# Patient Record
Sex: MALE | Race: WHITE
[De-identification: names, ages, dates, MRNs, and addresses within clinical notes are randomized per-mention and may not be internally consistent; named-entity substitution may affect disease eponyms.]

---

## 2018-04-02 ENCOUNTER — HOSPITAL ENCOUNTER (OUTPATIENT)
Dept: HOSPITAL 8 - CVU | Age: 83
Discharge: HOME | End: 2018-04-02
Attending: INTERNAL MEDICINE
Payer: MEDICARE

## 2018-04-02 DIAGNOSIS — I35.1: Primary | ICD-10-CM

## 2018-04-02 DIAGNOSIS — I10: ICD-10-CM

## 2018-04-02 DIAGNOSIS — I25.10: ICD-10-CM

## 2018-04-02 LAB
ALBUMIN SERPL-MCNC: 3.7 G/DL (ref 3.4–5)
ALP SERPL-CCNC: 74 U/L (ref 45–117)
ALT SERPL-CCNC: 35 U/L (ref 12–78)
ANION GAP SERPL CALC-SCNC: 7 MMOL/L (ref 5–15)
BASOPHILS # BLD AUTO: 0.03 X10^3/UL (ref 0–0.1)
BASOPHILS NFR BLD AUTO: 1 % (ref 0–1)
BILIRUB SERPL-MCNC: 0.7 MG/DL (ref 0.2–1)
CALCIUM SERPL-MCNC: 8.6 MG/DL (ref 8.5–10.1)
CHLORIDE SERPL-SCNC: 104 MMOL/L (ref 98–107)
CHOL/HDL RATIO: 2.4
CREAT SERPL-MCNC: 1.13 MG/DL (ref 0.7–1.3)
EOSINOPHIL # BLD AUTO: 0.09 X10^3/UL (ref 0–0.4)
EOSINOPHIL NFR BLD AUTO: 2 % (ref 1–7)
ERYTHROCYTE [DISTWIDTH] IN BLOOD BY AUTOMATED COUNT: 14 % (ref 9.4–14.8)
HDL CHOL %: 42 % (ref 26–37)
HDL CHOLESTEROL (DIRECT): 72 MG/DL (ref 40–60)
LDL CHOLESTEROL,CALCULATED: 75 MG/DL (ref 54–169)
LDLC/HDLC SERPL: 1 {RATIO} (ref 0.5–3)
LYMPHOCYTES # BLD AUTO: 1.55 X10^3/UL (ref 1–3.4)
LYMPHOCYTES NFR BLD AUTO: 26 % (ref 22–44)
MCH RBC QN AUTO: 34.6 PG (ref 27.5–34.5)
MCHC RBC AUTO-ENTMCNC: 34 G/DL (ref 33.2–36.2)
MCV RBC AUTO: 101.7 FL (ref 81–97)
MD: NO
MONOCYTES # BLD AUTO: 0.58 X10^3/UL (ref 0.2–0.8)
MONOCYTES NFR BLD AUTO: 10 % (ref 2–9)
NEUTROPHILS # BLD AUTO: 3.72 X10^3/UL (ref 1.8–6.8)
NEUTROPHILS NFR BLD AUTO: 62 % (ref 42–75)
PLATELET # BLD AUTO: 187 X10^3/UL (ref 130–400)
PMV BLD AUTO: 7.4 FL (ref 7.4–10.4)
PROT SERPL-MCNC: 7.4 G/DL (ref 6.4–8.2)
RBC # BLD AUTO: 4.37 X10^6/UL (ref 4.38–5.82)
T4 (THYROXINE): 6.5 MCG/DL (ref 4.5–12.1)
TRIGL SERPL-MCNC: 114 MG/DL (ref 50–200)
TSH SERPL-ACNC: 3.58 MIU/L (ref 0.36–3.74)
VLDLC SERPL CALC-MCNC: 23 MG/DL (ref 0–25)

## 2018-04-02 PROCEDURE — 85025 COMPLETE CBC W/AUTO DIFF WBC: CPT

## 2018-04-02 PROCEDURE — 84443 ASSAY THYROID STIM HORMONE: CPT

## 2018-04-02 PROCEDURE — 36415 COLL VENOUS BLD VENIPUNCTURE: CPT

## 2018-04-02 PROCEDURE — 84436 ASSAY OF TOTAL THYROXINE: CPT

## 2018-04-02 PROCEDURE — 84481 FREE ASSAY (FT-3): CPT

## 2018-04-02 PROCEDURE — 93306 TTE W/DOPPLER COMPLETE: CPT

## 2018-04-02 PROCEDURE — 80053 COMPREHEN METABOLIC PANEL: CPT

## 2018-04-02 PROCEDURE — 80061 LIPID PANEL: CPT

## 2020-01-01 ENCOUNTER — APPOINTMENT (OUTPATIENT)
Dept: RADIOLOGY | Facility: MEDICAL CENTER | Age: 85
DRG: 177 | End: 2020-01-01
Attending: HOSPITALIST
Payer: MEDICARE

## 2020-01-01 ENCOUNTER — APPOINTMENT (OUTPATIENT)
Dept: RADIOLOGY | Facility: MEDICAL CENTER | Age: 85
DRG: 177 | End: 2020-01-01
Attending: EMERGENCY MEDICINE
Payer: MEDICARE

## 2020-01-01 ENCOUNTER — TELEPHONE (OUTPATIENT)
Dept: CARDIOLOGY | Facility: MEDICAL CENTER | Age: 85
End: 2020-01-01

## 2020-01-01 ENCOUNTER — APPOINTMENT (OUTPATIENT)
Dept: RADIOLOGY | Facility: MEDICAL CENTER | Age: 85
DRG: 177 | End: 2020-01-01
Attending: NURSE PRACTITIONER
Payer: MEDICARE

## 2020-01-01 ENCOUNTER — HOSPITAL ENCOUNTER (INPATIENT)
Facility: MEDICAL CENTER | Age: 85
LOS: 22 days | DRG: 177 | End: 2020-08-25
Attending: EMERGENCY MEDICINE | Admitting: INTERNAL MEDICINE
Payer: MEDICARE

## 2020-01-01 ENCOUNTER — APPOINTMENT (OUTPATIENT)
Dept: CARDIOLOGY | Facility: MEDICAL CENTER | Age: 85
DRG: 177 | End: 2020-01-01
Attending: HOSPITALIST
Payer: MEDICARE

## 2020-01-01 VITALS
HEART RATE: 87 BPM | RESPIRATION RATE: 16 BRPM | BODY MASS INDEX: 22.57 KG/M2 | SYSTOLIC BLOOD PRESSURE: 121 MMHG | TEMPERATURE: 98.2 F | OXYGEN SATURATION: 95 % | DIASTOLIC BLOOD PRESSURE: 74 MMHG | WEIGHT: 157.63 LBS | HEIGHT: 70 IN

## 2020-01-01 DIAGNOSIS — E53.8 VITAMIN B12 DEFICIENCY: ICD-10-CM

## 2020-01-01 DIAGNOSIS — G93.6 VASOGENIC EDEMA (HCC): ICD-10-CM

## 2020-01-01 DIAGNOSIS — R90.89 ABNORMAL CT OF BRAIN: ICD-10-CM

## 2020-01-01 DIAGNOSIS — D53.9 MACROCYTIC ANEMIA: ICD-10-CM
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
## 2020-01-01 DIAGNOSIS — S42.035A CLOSED NONDISPLACED FRACTURE OF ACROMIAL END OF LEFT CLAVICLE, INITIAL ENCOUNTER: ICD-10-CM

## 2020-01-01 DIAGNOSIS — R52 INTRACTABLE PAIN: ICD-10-CM

## 2020-01-01 LAB
ALBUMIN SERPL BCP-MCNC: 2.5 G/DL (ref 3.2–4.9)
ALBUMIN SERPL BCP-MCNC: 2.6 G/DL (ref 3.2–4.9)
ALBUMIN SERPL BCP-MCNC: 2.7 G/DL (ref 3.2–4.9)
ALBUMIN SERPL BCP-MCNC: 2.8 G/DL (ref 3.2–4.9)
ALBUMIN SERPL BCP-MCNC: 2.9 G/DL (ref 3.2–4.9)
ALBUMIN SERPL BCP-MCNC: 3 G/DL (ref 3.2–4.9)
ALBUMIN SERPL BCP-MCNC: 3.1 G/DL (ref 3.2–4.9)
ALBUMIN SERPL BCP-MCNC: 3.3 G/DL (ref 3.2–4.9)
ALBUMIN/GLOB SERPL: 0.8 G/DL
ALBUMIN/GLOB SERPL: 0.9 G/DL
ALP SERPL-CCNC: 75 U/L (ref 30–99)
ALP SERPL-CCNC: 79 U/L (ref 30–99)
ALP SERPL-CCNC: 83 U/L (ref 30–99)
ALT SERPL-CCNC: 11 U/L (ref 2–50)
ALT SERPL-CCNC: 13 U/L (ref 2–50)
ALT SERPL-CCNC: 16 U/L (ref 2–50)
AMMONIA PLAS-SCNC: 32 UMOL/L (ref 11–45)
ANION GAP SERPL CALC-SCNC: 11 MMOL/L (ref 7–16)
ANION GAP SERPL CALC-SCNC: 12 MMOL/L (ref 7–16)
ANION GAP SERPL CALC-SCNC: 14 MMOL/L (ref 7–16)
ANION GAP SERPL CALC-SCNC: 9 MMOL/L (ref 7–16)
APTT PPP: 30 SEC (ref 24.7–36)
AST SERPL-CCNC: 14 U/L (ref 12–45)
AST SERPL-CCNC: 19 U/L (ref 12–45)
AST SERPL-CCNC: 25 U/L (ref 12–45)
BACTERIA BLD CULT: NORMAL
BASE EXCESS BLDA CALC-SCNC: 1 MMOL/L (ref -4–3)
BASOPHILS # BLD AUTO: 0 % (ref 0–1.8)
BASOPHILS # BLD AUTO: 0.2 % (ref 0–1.8)
BASOPHILS # BLD AUTO: 0.3 % (ref 0–1.8)
BASOPHILS # BLD AUTO: 0.4 % (ref 0–1.8)
BASOPHILS # BLD: 0 K/UL (ref 0–0.12)
BASOPHILS # BLD: 0.02 K/UL (ref 0–0.12)
BASOPHILS # BLD: 0.03 K/UL (ref 0–0.12)
BILIRUB CONJ SERPL-MCNC: <0.2 MG/DL (ref 0.1–0.5)
BILIRUB INDIRECT SERPL-MCNC: ABNORMAL MG/DL (ref 0–1)
BILIRUB SERPL-MCNC: 0.5 MG/DL (ref 0.1–1.5)
BILIRUB SERPL-MCNC: 0.6 MG/DL (ref 0.1–1.5)
BILIRUB SERPL-MCNC: 0.6 MG/DL (ref 0.1–1.5)
BODY TEMPERATURE: ABNORMAL CENTIGRADE
BUN SERPL-MCNC: 10 MG/DL (ref 8–22)
BUN SERPL-MCNC: 10 MG/DL (ref 8–22)
BUN SERPL-MCNC: 11 MG/DL (ref 8–22)
BUN SERPL-MCNC: 11 MG/DL (ref 8–22)
BUN SERPL-MCNC: 12 MG/DL (ref 8–22)
BUN SERPL-MCNC: 13 MG/DL (ref 8–22)
BUN SERPL-MCNC: 14 MG/DL (ref 8–22)
BUN SERPL-MCNC: 7 MG/DL (ref 8–22)
BUN SERPL-MCNC: 8 MG/DL (ref 8–22)
BUN SERPL-MCNC: 9 MG/DL (ref 8–22)
BUN SERPL-MCNC: 9 MG/DL (ref 8–22)
CALCIUM SERPL-MCNC: 8 MG/DL (ref 8.5–10.5)
CALCIUM SERPL-MCNC: 8 MG/DL (ref 8.5–10.5)
CALCIUM SERPL-MCNC: 8.3 MG/DL (ref 8.5–10.5)
CALCIUM SERPL-MCNC: 8.4 MG/DL (ref 8.5–10.5)
CALCIUM SERPL-MCNC: 8.4 MG/DL (ref 8.5–10.5)
CALCIUM SERPL-MCNC: 8.5 MG/DL (ref 8.5–10.5)
CALCIUM SERPL-MCNC: 8.5 MG/DL (ref 8.5–10.5)
CALCIUM SERPL-MCNC: 8.6 MG/DL (ref 8.5–10.5)
CALCIUM SERPL-MCNC: 8.7 MG/DL (ref 8.5–10.5)
CALCIUM SERPL-MCNC: 8.9 MG/DL (ref 8.5–10.5)
CALCIUM SERPL-MCNC: 9 MG/DL (ref 8.5–10.5)
CHLORIDE SERPL-SCNC: 101 MMOL/L (ref 96–112)
CHLORIDE SERPL-SCNC: 102 MMOL/L (ref 96–112)
CHLORIDE SERPL-SCNC: 103 MMOL/L (ref 96–112)
CHLORIDE SERPL-SCNC: 103 MMOL/L (ref 96–112)
CHLORIDE SERPL-SCNC: 104 MMOL/L (ref 96–112)
CHLORIDE SERPL-SCNC: 104 MMOL/L (ref 96–112)
CHLORIDE SERPL-SCNC: 105 MMOL/L (ref 96–112)
CHLORIDE SERPL-SCNC: 106 MMOL/L (ref 96–112)
CHLORIDE SERPL-SCNC: 98 MMOL/L (ref 96–112)
CHLORIDE SERPL-SCNC: 99 MMOL/L (ref 96–112)
CHLORIDE SERPL-SCNC: 99 MMOL/L (ref 96–112)
CHOLEST SERPL-MCNC: 108 MG/DL (ref 100–199)
CK SERPL-CCNC: 20 U/L (ref 0–154)
CO2 SERPL-SCNC: 22 MMOL/L (ref 20–33)
CO2 SERPL-SCNC: 22 MMOL/L (ref 20–33)
CO2 SERPL-SCNC: 23 MMOL/L (ref 20–33)
CO2 SERPL-SCNC: 24 MMOL/L (ref 20–33)
CO2 SERPL-SCNC: 25 MMOL/L (ref 20–33)
CO2 SERPL-SCNC: 25 MMOL/L (ref 20–33)
CO2 SERPL-SCNC: 26 MMOL/L (ref 20–33)
CO2 SERPL-SCNC: 28 MMOL/L (ref 20–33)
COVID ORDER STATUS COVID19: NORMAL
COVID ORDER STATUS COVID19: NORMAL
CREAT SERPL-MCNC: 0.5 MG/DL (ref 0.5–1.4)
CREAT SERPL-MCNC: 0.5 MG/DL (ref 0.5–1.4)
CREAT SERPL-MCNC: 0.54 MG/DL (ref 0.5–1.4)
CREAT SERPL-MCNC: 0.54 MG/DL (ref 0.5–1.4)
CREAT SERPL-MCNC: 0.59 MG/DL (ref 0.5–1.4)
CREAT SERPL-MCNC: 0.6 MG/DL (ref 0.5–1.4)
CREAT SERPL-MCNC: 0.62 MG/DL (ref 0.5–1.4)
CREAT SERPL-MCNC: 0.63 MG/DL (ref 0.5–1.4)
CREAT SERPL-MCNC: 0.63 MG/DL (ref 0.5–1.4)
CREAT SERPL-MCNC: 0.68 MG/DL (ref 0.5–1.4)
CREAT SERPL-MCNC: 0.69 MG/DL (ref 0.5–1.4)
EKG IMPRESSION: NORMAL
EOSINOPHIL # BLD AUTO: 0.1 K/UL (ref 0–0.51)
EOSINOPHIL # BLD AUTO: 0.12 K/UL (ref 0–0.51)
EOSINOPHIL # BLD AUTO: 0.17 K/UL (ref 0–0.51)
EOSINOPHIL # BLD AUTO: 0.18 K/UL (ref 0–0.51)
EOSINOPHIL # BLD AUTO: 0.18 K/UL (ref 0–0.51)
EOSINOPHIL # BLD AUTO: 0.2 K/UL (ref 0–0.51)
EOSINOPHIL # BLD AUTO: 0.24 K/UL (ref 0–0.51)
EOSINOPHIL NFR BLD: 0.7 % (ref 0–6.9)
EOSINOPHIL NFR BLD: 0.9 % (ref 0–6.9)
EOSINOPHIL NFR BLD: 1.9 % (ref 0–6.9)
EOSINOPHIL NFR BLD: 2.2 % (ref 0–6.9)
EOSINOPHIL NFR BLD: 2.4 % (ref 0–6.9)
EOSINOPHIL NFR BLD: 3 % (ref 0–6.9)
EOSINOPHIL NFR BLD: 4.4 % (ref 0–6.9)
ERYTHROCYTE [DISTWIDTH] IN BLOOD BY AUTOMATED COUNT: 48.5 FL (ref 35.9–50)
ERYTHROCYTE [DISTWIDTH] IN BLOOD BY AUTOMATED COUNT: 49 FL (ref 35.9–50)
ERYTHROCYTE [DISTWIDTH] IN BLOOD BY AUTOMATED COUNT: 49.4 FL (ref 35.9–50)
ERYTHROCYTE [DISTWIDTH] IN BLOOD BY AUTOMATED COUNT: 50 FL (ref 35.9–50)
ERYTHROCYTE [DISTWIDTH] IN BLOOD BY AUTOMATED COUNT: 51.7 FL (ref 35.9–50)
ERYTHROCYTE [DISTWIDTH] IN BLOOD BY AUTOMATED COUNT: 52.2 FL (ref 35.9–50)
ERYTHROCYTE [DISTWIDTH] IN BLOOD BY AUTOMATED COUNT: 52.7 FL (ref 35.9–50)
FERRITIN SERPL-MCNC: 433 NG/ML (ref 22–322)
FOLATE SERPL-MCNC: 5.3 NG/ML
GAMMA INTERFERON BACKGROUND BLD IA-ACNC: 0.04 IU/ML
GLOBULIN SER CALC-MCNC: 3.1 G/DL (ref 1.9–3.5)
GLOBULIN SER CALC-MCNC: 3.3 G/DL (ref 1.9–3.5)
GLUCOSE BLD-MCNC: 101 MG/DL (ref 65–99)
GLUCOSE BLD-MCNC: 136 MG/DL (ref 65–99)
GLUCOSE SERPL-MCNC: 102 MG/DL (ref 65–99)
GLUCOSE SERPL-MCNC: 105 MG/DL (ref 65–99)
GLUCOSE SERPL-MCNC: 120 MG/DL (ref 65–99)
GLUCOSE SERPL-MCNC: 120 MG/DL (ref 65–99)
GLUCOSE SERPL-MCNC: 121 MG/DL (ref 65–99)
GLUCOSE SERPL-MCNC: 141 MG/DL (ref 65–99)
GLUCOSE SERPL-MCNC: 71 MG/DL (ref 65–99)
GLUCOSE SERPL-MCNC: 84 MG/DL (ref 65–99)
GLUCOSE SERPL-MCNC: 86 MG/DL (ref 65–99)
GLUCOSE SERPL-MCNC: 88 MG/DL (ref 65–99)
GLUCOSE SERPL-MCNC: 95 MG/DL (ref 65–99)
HCO3 BLDA-SCNC: 24 MMOL/L (ref 17–25)
HCT VFR BLD AUTO: 39.8 % (ref 42–52)
HCT VFR BLD AUTO: 40.4 % (ref 42–52)
HCT VFR BLD AUTO: 43.9 % (ref 42–52)
HCT VFR BLD AUTO: 44.2 % (ref 42–52)
HCT VFR BLD AUTO: 45 % (ref 42–52)
HCT VFR BLD AUTO: 47.1 % (ref 42–52)
HCT VFR BLD AUTO: 49.5 % (ref 42–52)
HDLC SERPL-MCNC: 52 MG/DL
HGB BLD-MCNC: 13.7 G/DL (ref 14–18)
HGB BLD-MCNC: 14.2 G/DL (ref 14–18)
HGB BLD-MCNC: 15.3 G/DL (ref 14–18)
HGB BLD-MCNC: 15.4 G/DL (ref 14–18)
HGB BLD-MCNC: 15.7 G/DL (ref 14–18)
HGB BLD-MCNC: 16.4 G/DL (ref 14–18)
HGB BLD-MCNC: 16.7 G/DL (ref 14–18)
IMM GRANULOCYTES # BLD AUTO: 0.02 K/UL (ref 0–0.11)
IMM GRANULOCYTES # BLD AUTO: 0.02 K/UL (ref 0–0.11)
IMM GRANULOCYTES # BLD AUTO: 0.03 K/UL (ref 0–0.11)
IMM GRANULOCYTES # BLD AUTO: 0.03 K/UL (ref 0–0.11)
IMM GRANULOCYTES # BLD AUTO: 0.04 K/UL (ref 0–0.11)
IMM GRANULOCYTES # BLD AUTO: 0.06 K/UL (ref 0–0.11)
IMM GRANULOCYTES NFR BLD AUTO: 0.3 % (ref 0–0.9)
IMM GRANULOCYTES NFR BLD AUTO: 0.4 % (ref 0–0.9)
IMM GRANULOCYTES NFR BLD AUTO: 0.5 % (ref 0–0.9)
INR PPP: 1.06 (ref 0.87–1.13)
IRON SATN MFR SERPL: 27 % (ref 15–55)
IRON SERPL-MCNC: 39 UG/DL (ref 50–180)
LACTATE BLD-SCNC: 1.8 MMOL/L (ref 0.5–2)
LACTATE BLD-SCNC: 1.8 MMOL/L (ref 0.5–2)
LACTATE BLD-SCNC: 1.9 MMOL/L (ref 0.5–2)
LACTATE BLD-SCNC: 1.9 MMOL/L (ref 0.5–2)
LDLC SERPL CALC-MCNC: 41 MG/DL
LV EJECT FRACT  99904: 70
LV EJECT FRACT MOD 2C 99903: 70.53
LV EJECT FRACT MOD 4C 99902: 73.14
LV EJECT FRACT MOD BP 99901: 70.69
LYMPHOCYTES # BLD AUTO: 1.11 K/UL (ref 1–4.8)
LYMPHOCYTES # BLD AUTO: 1.12 K/UL (ref 1–4.8)
LYMPHOCYTES # BLD AUTO: 1.25 K/UL (ref 1–4.8)
LYMPHOCYTES # BLD AUTO: 1.25 K/UL (ref 1–4.8)
LYMPHOCYTES # BLD AUTO: 1.36 K/UL (ref 1–4.8)
LYMPHOCYTES # BLD AUTO: 1.53 K/UL (ref 1–4.8)
LYMPHOCYTES # BLD AUTO: 1.54 K/UL (ref 1–4.8)
LYMPHOCYTES NFR BLD: 11.3 % (ref 22–41)
LYMPHOCYTES NFR BLD: 15 % (ref 22–41)
LYMPHOCYTES NFR BLD: 17.4 % (ref 22–41)
LYMPHOCYTES NFR BLD: 17.9 % (ref 22–41)
LYMPHOCYTES NFR BLD: 18.7 % (ref 22–41)
LYMPHOCYTES NFR BLD: 20.2 % (ref 22–41)
LYMPHOCYTES NFR BLD: 8.3 % (ref 22–41)
M TB IFN-G BLD-IMP: NEGATIVE
M TB IFN-G CD4+ BCKGRND COR BLD-ACNC: 0 IU/ML
MAGNESIUM SERPL-MCNC: 1.7 MG/DL (ref 1.5–2.5)
MAGNESIUM SERPL-MCNC: 1.8 MG/DL (ref 1.5–2.5)
MAGNESIUM SERPL-MCNC: 1.9 MG/DL (ref 1.5–2.5)
MAGNESIUM SERPL-MCNC: 1.9 MG/DL (ref 1.5–2.5)
MANUAL DIFF BLD: ABNORMAL
MCH RBC QN AUTO: 36.4 PG (ref 27–33)
MCH RBC QN AUTO: 36.5 PG (ref 27–33)
MCH RBC QN AUTO: 36.7 PG (ref 27–33)
MCH RBC QN AUTO: 36.9 PG (ref 27–33)
MCH RBC QN AUTO: 37 PG (ref 27–33)
MCH RBC QN AUTO: 37 PG (ref 27–33)
MCH RBC QN AUTO: 37.1 PG (ref 27–33)
MCHC RBC AUTO-ENTMCNC: 33.7 G/DL (ref 33.7–35.3)
MCHC RBC AUTO-ENTMCNC: 34.4 G/DL (ref 33.7–35.3)
MCHC RBC AUTO-ENTMCNC: 34.8 G/DL (ref 33.7–35.3)
MCHC RBC AUTO-ENTMCNC: 34.8 G/DL (ref 33.7–35.3)
MCHC RBC AUTO-ENTMCNC: 34.9 G/DL (ref 33.7–35.3)
MCHC RBC AUTO-ENTMCNC: 34.9 G/DL (ref 33.7–35.3)
MCHC RBC AUTO-ENTMCNC: 35.1 G/DL (ref 33.7–35.3)
MCV RBC AUTO: 105.2 FL (ref 81.4–97.8)
MCV RBC AUTO: 105.4 FL (ref 81.4–97.8)
MCV RBC AUTO: 105.6 FL (ref 81.4–97.8)
MCV RBC AUTO: 105.9 FL (ref 81.4–97.8)
MCV RBC AUTO: 106.3 FL (ref 81.4–97.8)
MCV RBC AUTO: 106.6 FL (ref 81.4–97.8)
MCV RBC AUTO: 108.3 FL (ref 81.4–97.8)
MITOGEN IGNF BCKGRD COR BLD-ACNC: >10 IU/ML
MONOCYTES # BLD AUTO: 0.48 K/UL (ref 0–0.85)
MONOCYTES # BLD AUTO: 0.91 K/UL (ref 0–0.85)
MONOCYTES # BLD AUTO: 0.92 K/UL (ref 0–0.85)
MONOCYTES # BLD AUTO: 1.2 K/UL (ref 0–0.85)
MONOCYTES # BLD AUTO: 1.27 K/UL (ref 0–0.85)
MONOCYTES # BLD AUTO: 1.29 K/UL (ref 0–0.85)
MONOCYTES # BLD AUTO: 1.44 K/UL (ref 0–0.85)
MONOCYTES NFR BLD AUTO: 10.3 % (ref 0–13.4)
MONOCYTES NFR BLD AUTO: 15.4 % (ref 0–13.4)
MONOCYTES NFR BLD AUTO: 16.7 % (ref 0–13.4)
MONOCYTES NFR BLD AUTO: 18 % (ref 0–13.4)
MONOCYTES NFR BLD AUTO: 18.9 % (ref 0–13.4)
MONOCYTES NFR BLD AUTO: 3.5 % (ref 0–13.4)
MONOCYTES NFR BLD AUTO: 9.4 % (ref 0–13.4)
NEUTROPHILS # BLD AUTO: 10.9 K/UL (ref 1.82–7.42)
NEUTROPHILS # BLD AUTO: 11.46 K/UL (ref 1.82–7.42)
NEUTROPHILS # BLD AUTO: 3.19 K/UL (ref 1.82–7.42)
NEUTROPHILS # BLD AUTO: 3.97 K/UL (ref 1.82–7.42)
NEUTROPHILS # BLD AUTO: 4.57 K/UL (ref 1.82–7.42)
NEUTROPHILS # BLD AUTO: 5.57 K/UL (ref 1.82–7.42)
NEUTROPHILS # BLD AUTO: 6.13 K/UL (ref 1.82–7.42)
NEUTROPHILS NFR BLD: 57.9 % (ref 44–72)
NEUTROPHILS NFR BLD: 59.6 % (ref 44–72)
NEUTROPHILS NFR BLD: 60 % (ref 44–72)
NEUTROPHILS NFR BLD: 66.6 % (ref 44–72)
NEUTROPHILS NFR BLD: 69.6 % (ref 44–72)
NEUTROPHILS NFR BLD: 81 % (ref 44–72)
NEUTROPHILS NFR BLD: 84.3 % (ref 44–72)
NRBC # BLD AUTO: 0 K/UL
NRBC BLD-RTO: 0 /100 WBC
NT-PROBNP SERPL IA-MCNC: 1077 PG/ML (ref 0–125)
PCO2 BLDA: 32.6 MMHG (ref 26–37)
PH BLDA: 7.48 [PH] (ref 7.4–7.5)
PHOSPHATE SERPL-MCNC: 2 MG/DL (ref 2.5–4.5)
PHOSPHATE SERPL-MCNC: 2.2 MG/DL (ref 2.5–4.5)
PHOSPHATE SERPL-MCNC: 2.4 MG/DL (ref 2.5–4.5)
PHOSPHATE SERPL-MCNC: 2.7 MG/DL (ref 2.5–4.5)
PHOSPHATE SERPL-MCNC: 2.7 MG/DL (ref 2.5–4.5)
PHOSPHATE SERPL-MCNC: 3.1 MG/DL (ref 2.5–4.5)
PHOSPHATE SERPL-MCNC: 3.1 MG/DL (ref 2.5–4.5)
PLATELET # BLD AUTO: 162 K/UL (ref 164–446)
PLATELET # BLD AUTO: 169 K/UL (ref 164–446)
PLATELET # BLD AUTO: 173 K/UL (ref 164–446)
PLATELET # BLD AUTO: 183 K/UL (ref 164–446)
PLATELET # BLD AUTO: 184 K/UL (ref 164–446)
PLATELET # BLD AUTO: 225 K/UL (ref 164–446)
PLATELET # BLD AUTO: 226 K/UL (ref 164–446)
PMV BLD AUTO: 10 FL (ref 9–12.9)
PMV BLD AUTO: 10 FL (ref 9–12.9)
PMV BLD AUTO: 10.5 FL (ref 9–12.9)
PMV BLD AUTO: 10.8 FL (ref 9–12.9)
PMV BLD AUTO: 11.3 FL (ref 9–12.9)
PMV BLD AUTO: 9.7 FL (ref 9–12.9)
PMV BLD AUTO: 9.8 FL (ref 9–12.9)
PO2 BLDA: 65.3 MMHG (ref 64–87)
POTASSIUM SERPL-SCNC: 2.7 MMOL/L (ref 3.6–5.5)
POTASSIUM SERPL-SCNC: 3.2 MMOL/L (ref 3.6–5.5)
POTASSIUM SERPL-SCNC: 3.3 MMOL/L (ref 3.6–5.5)
POTASSIUM SERPL-SCNC: 3.3 MMOL/L (ref 3.6–5.5)
POTASSIUM SERPL-SCNC: 3.4 MMOL/L (ref 3.6–5.5)
POTASSIUM SERPL-SCNC: 3.6 MMOL/L (ref 3.6–5.5)
POTASSIUM SERPL-SCNC: 3.7 MMOL/L (ref 3.6–5.5)
POTASSIUM SERPL-SCNC: 3.8 MMOL/L (ref 3.6–5.5)
POTASSIUM SERPL-SCNC: 3.9 MMOL/L (ref 3.6–5.5)
POTASSIUM SERPL-SCNC: 3.9 MMOL/L (ref 3.6–5.5)
POTASSIUM SERPL-SCNC: 4 MMOL/L (ref 3.6–5.5)
PROCALCITONIN SERPL-MCNC: 0.06 NG/ML
PROCALCITONIN SERPL-MCNC: 0.23 NG/ML
PROT SERPL-MCNC: 5.9 G/DL (ref 6–8.2)
PROT SERPL-MCNC: 6.1 G/DL (ref 6–8.2)
PROT SERPL-MCNC: 6.4 G/DL (ref 6–8.2)
PROTHROMBIN TIME: 14.1 SEC (ref 12–14.6)
QFT TB2 - NIL TBQ2: 0.02 IU/ML
RBC # BLD AUTO: 3.76 M/UL (ref 4.7–6.1)
RBC # BLD AUTO: 3.84 M/UL (ref 4.7–6.1)
RBC # BLD AUTO: 4.12 M/UL (ref 4.7–6.1)
RBC # BLD AUTO: 4.16 M/UL (ref 4.7–6.1)
RBC # BLD AUTO: 4.26 M/UL (ref 4.7–6.1)
RBC # BLD AUTO: 4.47 M/UL (ref 4.7–6.1)
RBC # BLD AUTO: 4.57 M/UL (ref 4.7–6.1)
SAO2 % BLDA: 92.7 % (ref 93–99)
SARS-COV-2 RNA RESP QL NAA+PROBE: NOTDETECTED
SARS-COV-2 RNA RESP QL NAA+PROBE: NOTDETECTED
SIGNIFICANT IND 70042: NORMAL
SITE SITE: NORMAL
SODIUM SERPL-SCNC: 133 MMOL/L (ref 135–145)
SODIUM SERPL-SCNC: 133 MMOL/L (ref 135–145)
SODIUM SERPL-SCNC: 136 MMOL/L (ref 135–145)
SODIUM SERPL-SCNC: 138 MMOL/L (ref 135–145)
SODIUM SERPL-SCNC: 139 MMOL/L (ref 135–145)
SODIUM SERPL-SCNC: 140 MMOL/L (ref 135–145)
SODIUM SERPL-SCNC: 143 MMOL/L (ref 135–145)
SOURCE SOURCE: NORMAL
SPECIMEN SOURCE: NORMAL
SPECIMEN SOURCE: NORMAL
T4 FREE SERPL-MCNC: 0.96 NG/DL (ref 0.93–1.7)
TIBC SERPL-MCNC: 143 UG/DL (ref 250–450)
TRIGL SERPL-MCNC: 77 MG/DL (ref 0–149)
TROPONIN T SERPL-MCNC: 16 NG/L (ref 6–19)
TROPONIN T SERPL-MCNC: 22 NG/L (ref 6–19)
TROPONIN T SERPL-MCNC: 27 NG/L (ref 6–19)
TROPONIN T SERPL-MCNC: 38 NG/L (ref 6–19)
TSH SERPL DL<=0.005 MIU/L-ACNC: 7.99 UIU/ML (ref 0.38–5.33)
UIBC SERPL-MCNC: 104 UG/DL (ref 110–370)
VALPROATE SERPL-MCNC: 56.8 UG/ML (ref 50–100)
VALPROATE SERPL-MCNC: 60.6 UG/ML (ref 50–100)
VIT B12 SERPL-MCNC: 1776 PG/ML (ref 211–911)
VIT B12 SERPL-MCNC: 365 PG/ML (ref 211–911)
WBC # BLD AUTO: 13.5 K/UL (ref 4.8–10.8)
WBC # BLD AUTO: 13.6 K/UL (ref 4.8–10.8)
WBC # BLD AUTO: 5.5 K/UL (ref 4.8–10.8)
WBC # BLD AUTO: 6.7 K/UL (ref 4.8–10.8)
WBC # BLD AUTO: 7.6 K/UL (ref 4.8–10.8)
WBC # BLD AUTO: 8.4 K/UL (ref 4.8–10.8)
WBC # BLD AUTO: 8.8 K/UL (ref 4.8–10.8)

## 2020-01-01 PROCEDURE — 99232 SBSQ HOSP IP/OBS MODERATE 35: CPT | Performed by: HOSPITALIST

## 2020-01-01 PROCEDURE — 99232 SBSQ HOSP IP/OBS MODERATE 35: CPT | Performed by: INTERNAL MEDICINE

## 2020-01-01 PROCEDURE — 99231 SBSQ HOSP IP/OBS SF/LOW 25: CPT | Performed by: HOSPITALIST

## 2020-01-01 PROCEDURE — 94760 N-INVAS EAR/PLS OXIMETRY 1: CPT

## 2020-01-01 PROCEDURE — 70450 CT HEAD/BRAIN W/O DYE: CPT

## 2020-01-01 PROCEDURE — A9270 NON-COVERED ITEM OR SERVICE: HCPCS | Performed by: INTERNAL MEDICINE

## 2020-01-01 PROCEDURE — 700102 HCHG RX REV CODE 250 W/ 637 OVERRIDE(OP): Performed by: INTERNAL MEDICINE

## 2020-01-01 PROCEDURE — 70496 CT ANGIOGRAPHY HEAD: CPT | Mod: ME

## 2020-01-01 PROCEDURE — 85025 COMPLETE CBC W/AUTO DIFF WBC: CPT

## 2020-01-01 PROCEDURE — A9270 NON-COVERED ITEM OR SERVICE: HCPCS | Performed by: PSYCHIATRY & NEUROLOGY

## 2020-01-01 PROCEDURE — 700101 HCHG RX REV CODE 250: Performed by: HOSPITALIST

## 2020-01-01 PROCEDURE — 700102 HCHG RX REV CODE 250 W/ 637 OVERRIDE(OP): Performed by: HOSPITALIST

## 2020-01-01 PROCEDURE — 770020 HCHG ROOM/CARE - TELE (206)

## 2020-01-01 PROCEDURE — 700111 HCHG RX REV CODE 636 W/ 250 OVERRIDE (IP): Performed by: PSYCHIATRY & NEUROLOGY

## 2020-01-01 PROCEDURE — 700102 HCHG RX REV CODE 250 W/ 637 OVERRIDE(OP): Performed by: PSYCHIATRY & NEUROLOGY

## 2020-01-01 PROCEDURE — A9270 NON-COVERED ITEM OR SERVICE: HCPCS | Performed by: HOSPITALIST

## 2020-01-01 PROCEDURE — 770006 HCHG ROOM/CARE - MED/SURG/GYN SEMI*

## 2020-01-01 PROCEDURE — 36415 COLL VENOUS BLD VENIPUNCTURE: CPT

## 2020-01-01 PROCEDURE — 87040 BLOOD CULTURE FOR BACTERIA: CPT | Mod: 91

## 2020-01-01 PROCEDURE — 700111 HCHG RX REV CODE 636 W/ 250 OVERRIDE (IP): Performed by: INTERNAL MEDICINE

## 2020-01-01 PROCEDURE — 93005 ELECTROCARDIOGRAM TRACING: CPT | Performed by: EMERGENCY MEDICINE

## 2020-01-01 PROCEDURE — 97165 OT EVAL LOW COMPLEX 30 MIN: CPT

## 2020-01-01 PROCEDURE — 700111 HCHG RX REV CODE 636 W/ 250 OVERRIDE (IP): Performed by: HOSPITALIST

## 2020-01-01 PROCEDURE — 80053 COMPREHEN METABOLIC PANEL: CPT

## 2020-01-01 PROCEDURE — 84145 PROCALCITONIN (PCT): CPT

## 2020-01-01 PROCEDURE — 0042T CT-CEREBRAL PERFUSION ANALYSIS: CPT | Mod: MG

## 2020-01-01 PROCEDURE — 83605 ASSAY OF LACTIC ACID: CPT | Mod: 91

## 2020-01-01 PROCEDURE — 80069 RENAL FUNCTION PANEL: CPT

## 2020-01-01 PROCEDURE — 99285 EMERGENCY DEPT VISIT HI MDM: CPT

## 2020-01-01 PROCEDURE — 83540 ASSAY OF IRON: CPT

## 2020-01-01 PROCEDURE — 700117 HCHG RX CONTRAST REV CODE 255: Performed by: HOSPITALIST

## 2020-01-01 PROCEDURE — 93010 ELECTROCARDIOGRAM REPORT: CPT | Performed by: INTERNAL MEDICINE

## 2020-01-01 PROCEDURE — 84484 ASSAY OF TROPONIN QUANT: CPT

## 2020-01-01 PROCEDURE — 80061 LIPID PANEL: CPT

## 2020-01-01 PROCEDURE — 92610 EVALUATE SWALLOWING FUNCTION: CPT

## 2020-01-01 PROCEDURE — 80048 BASIC METABOLIC PNL TOTAL CA: CPT

## 2020-01-01 PROCEDURE — 82803 BLOOD GASES ANY COMBINATION: CPT

## 2020-01-01 PROCEDURE — 700105 HCHG RX REV CODE 258: Performed by: HOSPITALIST

## 2020-01-01 PROCEDURE — 700117 HCHG RX CONTRAST REV CODE 255: Performed by: EMERGENCY MEDICINE

## 2020-01-01 PROCEDURE — 93005 ELECTROCARDIOGRAM TRACING: CPT | Performed by: HOSPITALIST

## 2020-01-01 PROCEDURE — 99223 1ST HOSP IP/OBS HIGH 75: CPT | Performed by: PSYCHIATRY & NEUROLOGY

## 2020-01-01 PROCEDURE — 70450 CT HEAD/BRAIN W/O DYE: CPT | Mod: ME

## 2020-01-01 PROCEDURE — 700117 HCHG RX CONTRAST REV CODE 255: Performed by: NURSE PRACTITIONER

## 2020-01-01 PROCEDURE — C9803 HOPD COVID-19 SPEC COLLECT: HCPCS | Performed by: HOSPITALIST

## 2020-01-01 PROCEDURE — 97161 PT EVAL LOW COMPLEX 20 MIN: CPT

## 2020-01-01 PROCEDURE — 700102 HCHG RX REV CODE 250 W/ 637 OVERRIDE(OP): Performed by: NURSE PRACTITIONER

## 2020-01-01 PROCEDURE — 770004 HCHG ROOM/CARE - ONCOLOGY PRIVATE *

## 2020-01-01 PROCEDURE — 99239 HOSP IP/OBS DSCHRG MGMT >30: CPT | Mod: GW | Performed by: INTERNAL MEDICINE

## 2020-01-01 PROCEDURE — U0003 INFECTIOUS AGENT DETECTION BY NUCLEIC ACID (DNA OR RNA); SEVERE ACUTE RESPIRATORY SYNDROME CORONAVIRUS 2 (SARS-COV-2) (CORONAVIRUS DISEASE [COVID-19]), AMPLIFIED PROBE TECHNIQUE, MAKING USE OF HIGH THROUGHPUT TECHNOLOGIES AS DESCRIBED BY CMS-2020-01-R: HCPCS

## 2020-01-01 PROCEDURE — 700105 HCHG RX REV CODE 258

## 2020-01-01 PROCEDURE — 83735 ASSAY OF MAGNESIUM: CPT

## 2020-01-01 PROCEDURE — 93306 TTE W/DOPPLER COMPLETE: CPT | Mod: 26 | Performed by: INTERNAL MEDICINE

## 2020-01-01 PROCEDURE — 84155 ASSAY OF PROTEIN SERUM: CPT

## 2020-01-01 PROCEDURE — 51798 US URINE CAPACITY MEASURE: CPT

## 2020-01-01 PROCEDURE — 70498 CT ANGIOGRAPHY NECK: CPT | Mod: MG

## 2020-01-01 PROCEDURE — 82962 GLUCOSE BLOOD TEST: CPT

## 2020-01-01 PROCEDURE — A9270 NON-COVERED ITEM OR SERVICE: HCPCS | Performed by: NURSE PRACTITIONER

## 2020-01-01 PROCEDURE — 71260 CT THORAX DX C+: CPT

## 2020-01-01 PROCEDURE — 85730 THROMBOPLASTIN TIME PARTIAL: CPT

## 2020-01-01 PROCEDURE — 99232 SBSQ HOSP IP/OBS MODERATE 35: CPT | Performed by: PSYCHIATRY & NEUROLOGY

## 2020-01-01 PROCEDURE — 770009 HCHG ROOM/CARE - ONCOLOGY SEMI PRI*

## 2020-01-01 PROCEDURE — C9803 HOPD COVID-19 SPEC COLLECT: HCPCS | Performed by: INTERNAL MEDICINE

## 2020-01-01 PROCEDURE — 97530 THERAPEUTIC ACTIVITIES: CPT

## 2020-01-01 PROCEDURE — 85007 BL SMEAR W/DIFF WBC COUNT: CPT

## 2020-01-01 PROCEDURE — 84450 TRANSFERASE (AST) (SGOT): CPT

## 2020-01-01 PROCEDURE — 700111 HCHG RX REV CODE 636 W/ 250 OVERRIDE (IP): Performed by: EMERGENCY MEDICINE

## 2020-01-01 PROCEDURE — 85027 COMPLETE CBC AUTOMATED: CPT

## 2020-01-01 PROCEDURE — 82247 BILIRUBIN TOTAL: CPT

## 2020-01-01 PROCEDURE — 71045 X-RAY EXAM CHEST 1 VIEW: CPT

## 2020-01-01 PROCEDURE — 82746 ASSAY OF FOLIC ACID SERUM: CPT

## 2020-01-01 PROCEDURE — 84075 ASSAY ALKALINE PHOSPHATASE: CPT

## 2020-01-01 PROCEDURE — 86480 TB TEST CELL IMMUN MEASURE: CPT

## 2020-01-01 PROCEDURE — 82140 ASSAY OF AMMONIA: CPT

## 2020-01-01 PROCEDURE — 83550 IRON BINDING TEST: CPT

## 2020-01-01 PROCEDURE — 99221 1ST HOSP IP/OBS SF/LOW 40: CPT | Performed by: INTERNAL MEDICINE

## 2020-01-01 PROCEDURE — 84460 ALANINE AMINO (ALT) (SGPT): CPT

## 2020-01-01 PROCEDURE — 82550 ASSAY OF CK (CPK): CPT

## 2020-01-01 PROCEDURE — 82248 BILIRUBIN DIRECT: CPT

## 2020-01-01 PROCEDURE — 93306 TTE W/DOPPLER COMPLETE: CPT

## 2020-01-01 PROCEDURE — 96376 TX/PRO/DX INJ SAME DRUG ADON: CPT

## 2020-01-01 PROCEDURE — 99233 SBSQ HOSP IP/OBS HIGH 50: CPT | Performed by: PSYCHIATRY & NEUROLOGY

## 2020-01-01 PROCEDURE — 82607 VITAMIN B-12: CPT

## 2020-01-01 PROCEDURE — 85610 PROTHROMBIN TIME: CPT

## 2020-01-01 PROCEDURE — 82728 ASSAY OF FERRITIN: CPT

## 2020-01-01 PROCEDURE — 83880 ASSAY OF NATRIURETIC PEPTIDE: CPT

## 2020-01-01 PROCEDURE — 70553 MRI BRAIN STEM W/O & W/DYE: CPT

## 2020-01-01 PROCEDURE — 96374 THER/PROPH/DIAG INJ IV PUSH: CPT

## 2020-01-01 PROCEDURE — 97116 GAIT TRAINING THERAPY: CPT

## 2020-01-01 PROCEDURE — 80164 ASSAY DIPROPYLACETIC ACD TOT: CPT

## 2020-01-01 PROCEDURE — 73030 X-RAY EXAM OF SHOULDER: CPT | Mod: LT

## 2020-01-01 PROCEDURE — 84439 ASSAY OF FREE THYROXINE: CPT

## 2020-01-01 PROCEDURE — 84443 ASSAY THYROID STIM HORMONE: CPT

## 2020-01-01 PROCEDURE — A9576 INJ PROHANCE MULTIPACK: HCPCS | Performed by: HOSPITALIST

## 2020-01-01 RX ORDER — MORPHINE SULFATE 4 MG/ML
4 INJECTION, SOLUTION INTRAMUSCULAR; INTRAVENOUS ONCE
Status: COMPLETED | OUTPATIENT
Start: 2020-01-01 | End: 2020-01-01

## 2020-01-01 RX ORDER — POTASSIUM CHLORIDE 7.45 MG/ML
10 INJECTION INTRAVENOUS
Status: COMPLETED | OUTPATIENT
Start: 2020-01-01 | End: 2020-01-01

## 2020-01-01 RX ORDER — DIVALPROEX SODIUM 125 MG/1
125 CAPSULE, COATED PELLETS ORAL EVERY 8 HOURS
Status: DISCONTINUED | OUTPATIENT
Start: 2020-01-01 | End: 2020-01-01

## 2020-01-01 RX ORDER — BISACODYL 10 MG
10 SUPPOSITORY, RECTAL RECTAL
Status: DISCONTINUED | OUTPATIENT
Start: 2020-01-01 | End: 2020-01-01

## 2020-01-01 RX ORDER — NYSTATIN 100000 U/G
1 CREAM TOPICAL 2 TIMES DAILY
Status: SHIPPED
Start: 2020-01-01

## 2020-01-01 RX ORDER — ACETAMINOPHEN 325 MG/1
650 TABLET ORAL EVERY 6 HOURS PRN
Status: DISCONTINUED | OUTPATIENT
Start: 2020-01-01 | End: 2020-01-01 | Stop reason: HOSPADM

## 2020-01-01 RX ORDER — ZIPRASIDONE MESYLATE 20 MG/ML
10 INJECTION, POWDER, LYOPHILIZED, FOR SOLUTION INTRAMUSCULAR EVERY 6 HOURS PRN
Status: DISCONTINUED | OUTPATIENT
Start: 2020-01-01 | End: 2020-01-01 | Stop reason: HOSPADM

## 2020-01-01 RX ORDER — LIDOCAINE 50 MG/G
1 PATCH TOPICAL EVERY 24 HOURS
Qty: 10 PATCH | Status: SHIPPED
Start: 2020-01-01

## 2020-01-01 RX ORDER — KETOROLAC TROMETHAMINE 30 MG/ML
15 INJECTION, SOLUTION INTRAMUSCULAR; INTRAVENOUS EVERY 6 HOURS PRN
Status: DISCONTINUED | OUTPATIENT
Start: 2020-01-01 | End: 2020-01-01

## 2020-01-01 RX ORDER — AMOXICILLIN 250 MG
2 CAPSULE ORAL 2 TIMES DAILY
Status: DISCONTINUED | OUTPATIENT
Start: 2020-01-01 | End: 2020-01-01

## 2020-01-01 RX ORDER — SODIUM CHLORIDE 9 MG/ML
INJECTION, SOLUTION INTRAVENOUS
Status: ACTIVE
Start: 2020-01-01 | End: 2020-01-01

## 2020-01-01 RX ORDER — ENALAPRILAT 1.25 MG/ML
1.25 INJECTION INTRAVENOUS EVERY 6 HOURS PRN
Status: DISCONTINUED | OUTPATIENT
Start: 2020-01-01 | End: 2020-01-01

## 2020-01-01 RX ORDER — QUETIAPINE FUMARATE 25 MG/1
25 TABLET, FILM COATED ORAL 2 TIMES DAILY
Status: DISCONTINUED | OUTPATIENT
Start: 2020-01-01 | End: 2020-01-01

## 2020-01-01 RX ORDER — MORPHINE SULFATE 4 MG/ML
2 INJECTION, SOLUTION INTRAMUSCULAR; INTRAVENOUS EVERY 4 HOURS PRN
Status: DISCONTINUED | OUTPATIENT
Start: 2020-01-01 | End: 2020-01-01 | Stop reason: HOSPADM

## 2020-01-01 RX ORDER — QUETIAPINE FUMARATE 25 MG/1
25 TABLET, FILM COATED ORAL 2 TIMES DAILY
Status: DISCONTINUED | OUTPATIENT
Start: 2020-01-01 | End: 2020-01-01 | Stop reason: HOSPADM

## 2020-01-01 RX ORDER — SODIUM CHLORIDE 9 MG/ML
INJECTION, SOLUTION INTRAVENOUS CONTINUOUS
Status: DISCONTINUED | OUTPATIENT
Start: 2020-01-01 | End: 2020-01-01

## 2020-01-01 RX ORDER — ATROPINE SULFATE 10 MG/ML
2 SOLUTION/ DROPS OPHTHALMIC EVERY 4 HOURS PRN
Status: DISCONTINUED | OUTPATIENT
Start: 2020-01-01 | End: 2020-01-01 | Stop reason: HOSPADM

## 2020-01-01 RX ORDER — LIDOCAINE 50 MG/G
1 PATCH TOPICAL EVERY 24 HOURS
Status: DISCONTINUED | OUTPATIENT
Start: 2020-01-01 | End: 2020-01-01 | Stop reason: HOSPADM

## 2020-01-01 RX ORDER — AMOXICILLIN AND CLAVULANATE POTASSIUM 875; 125 MG/1; MG/1
1 TABLET, FILM COATED ORAL EVERY 12 HOURS
Status: DISCONTINUED | OUTPATIENT
Start: 2020-01-01 | End: 2020-01-01

## 2020-01-01 RX ORDER — CYANOCOBALAMIN 1000 UG/ML
1000 INJECTION, SOLUTION INTRAMUSCULAR; SUBCUTANEOUS ONCE
Status: COMPLETED | OUTPATIENT
Start: 2020-01-01 | End: 2020-01-01

## 2020-01-01 RX ORDER — QUETIAPINE FUMARATE 25 MG/1
12.5 TABLET, FILM COATED ORAL 2 TIMES DAILY
Status: DISCONTINUED | OUTPATIENT
Start: 2020-01-01 | End: 2020-01-01

## 2020-01-01 RX ORDER — HALOPERIDOL 5 MG/ML
1 INJECTION INTRAMUSCULAR EVERY 4 HOURS PRN
Status: DISCONTINUED | OUTPATIENT
Start: 2020-01-01 | End: 2020-01-01

## 2020-01-01 RX ORDER — POTASSIUM CHLORIDE 20 MEQ/1
40 TABLET, EXTENDED RELEASE ORAL ONCE
Status: COMPLETED | OUTPATIENT
Start: 2020-01-01 | End: 2020-01-01

## 2020-01-01 RX ORDER — POTASSIUM CHLORIDE 20 MEQ/1
20 TABLET, EXTENDED RELEASE ORAL 4 TIMES DAILY
Status: COMPLETED | OUTPATIENT
Start: 2020-01-01 | End: 2020-01-01

## 2020-01-01 RX ORDER — QUETIAPINE FUMARATE 25 MG/1
25 TABLET, FILM COATED ORAL 2 TIMES DAILY
Qty: 60 TAB | Refills: 3 | Status: SHIPPED
Start: 2020-01-01

## 2020-01-01 RX ORDER — NYSTATIN 100000 U/G
CREAM TOPICAL 2 TIMES DAILY
Status: DISCONTINUED | OUTPATIENT
Start: 2020-01-01 | End: 2020-01-01 | Stop reason: HOSPADM

## 2020-01-01 RX ORDER — DIVALPROEX SODIUM 125 MG/1
250 CAPSULE, COATED PELLETS ORAL EVERY 8 HOURS
Qty: 120 CAP | Status: SHIPPED
Start: 2020-01-01

## 2020-01-01 RX ORDER — POLYETHYLENE GLYCOL 3350 17 G/17G
1 POWDER, FOR SOLUTION ORAL
Status: DISCONTINUED | OUTPATIENT
Start: 2020-01-01 | End: 2020-01-01 | Stop reason: HOSPADM

## 2020-01-01 RX ORDER — OMEPRAZOLE 20 MG/1
20 CAPSULE, DELAYED RELEASE ORAL DAILY
Status: DISCONTINUED | OUTPATIENT
Start: 2020-01-01 | End: 2020-01-01 | Stop reason: HOSPADM

## 2020-01-01 RX ORDER — CHOLECALCIFEROL (VITAMIN D3) 125 MCG
1000 CAPSULE ORAL DAILY
Status: DISCONTINUED | OUTPATIENT
Start: 2020-01-01 | End: 2020-01-01

## 2020-01-01 RX ORDER — DEXTROSE AND SODIUM CHLORIDE 5; .9 G/100ML; G/100ML
INJECTION, SOLUTION INTRAVENOUS CONTINUOUS
Status: DISCONTINUED | OUTPATIENT
Start: 2020-01-01 | End: 2020-01-01

## 2020-01-01 RX ORDER — SODIUM CHLORIDE 9 MG/ML
INJECTION, SOLUTION INTRAVENOUS
Status: COMPLETED
Start: 2020-01-01 | End: 2020-01-01

## 2020-01-01 RX ORDER — METOPROLOL SUCCINATE 25 MG/1
25 TABLET, EXTENDED RELEASE ORAL
Status: DISCONTINUED | OUTPATIENT
Start: 2020-01-01 | End: 2020-01-01

## 2020-01-01 RX ORDER — CYANOCOBALAMIN 1000 UG/ML
1000 INJECTION, SOLUTION INTRAMUSCULAR; SUBCUTANEOUS
Status: DISCONTINUED | OUTPATIENT
Start: 2020-01-01 | End: 2020-01-01

## 2020-01-01 RX ORDER — HEPARIN SODIUM 5000 [USP'U]/ML
5000 INJECTION, SOLUTION INTRAVENOUS; SUBCUTANEOUS EVERY 8 HOURS
Status: DISCONTINUED | OUTPATIENT
Start: 2020-01-01 | End: 2020-01-01

## 2020-01-01 RX ORDER — DIVALPROEX SODIUM 125 MG/1
250 CAPSULE, COATED PELLETS ORAL EVERY 8 HOURS
Status: DISCONTINUED | OUTPATIENT
Start: 2020-01-01 | End: 2020-01-01 | Stop reason: HOSPADM

## 2020-01-01 RX ORDER — DOXYCYCLINE 100 MG/1
100 TABLET ORAL EVERY 12 HOURS
Status: DISCONTINUED | OUTPATIENT
Start: 2020-01-01 | End: 2020-01-01

## 2020-01-01 RX ORDER — LANOLIN ALCOHOL/MO/W.PET/CERES
400 CREAM (GRAM) TOPICAL DAILY
Status: DISCONTINUED | OUTPATIENT
Start: 2020-01-01 | End: 2020-01-01

## 2020-01-01 RX ADMIN — POTASSIUM CHLORIDE 20 MEQ: 1500 TABLET, EXTENDED RELEASE ORAL at 13:21

## 2020-01-01 RX ADMIN — QUETIAPINE FUMARATE 12.5 MG: 25 TABLET ORAL at 16:36

## 2020-01-01 RX ADMIN — HALOPERIDOL LACTATE 1 MG: 5 INJECTION, SOLUTION INTRAMUSCULAR at 12:43

## 2020-01-01 RX ADMIN — DIVALPROEX SODIUM 250 MG: 125 CAPSULE, COATED PELLETS ORAL at 06:03

## 2020-01-01 RX ADMIN — QUETIAPINE FUMARATE 25 MG: 25 TABLET ORAL at 06:30

## 2020-01-01 RX ADMIN — QUETIAPINE FUMARATE 25 MG: 25 TABLET ORAL at 17:33

## 2020-01-01 RX ADMIN — CYANOCOBALAMIN TAB 500 MCG 1000 MCG: 500 TAB at 05:59

## 2020-01-01 RX ADMIN — DIVALPROEX SODIUM 250 MG: 125 CAPSULE, COATED PELLETS ORAL at 22:57

## 2020-01-01 RX ADMIN — POTASSIUM CHLORIDE 20 MEQ: 1500 TABLET, EXTENDED RELEASE ORAL at 22:44

## 2020-01-01 RX ADMIN — NYSTATIN: 100000 CREAM TOPICAL at 04:34

## 2020-01-01 RX ADMIN — DIVALPROEX SODIUM 250 MG: 125 CAPSULE, COATED PELLETS ORAL at 13:20

## 2020-01-01 RX ADMIN — AMPICILLIN AND SULBACTAM 3 G: 2; 1 INJECTION, POWDER, FOR SOLUTION INTRAVENOUS at 13:41

## 2020-01-01 RX ADMIN — ASPIRIN 81 MG: 81 TABLET, COATED ORAL at 05:28

## 2020-01-01 RX ADMIN — DIVALPROEX SODIUM 250 MG: 125 CAPSULE, COATED PELLETS ORAL at 21:57

## 2020-01-01 RX ADMIN — DIVALPROEX SODIUM 250 MG: 125 CAPSULE, COATED PELLETS ORAL at 22:34

## 2020-01-01 RX ADMIN — IOHEXOL 80 ML: 350 INJECTION, SOLUTION INTRAVENOUS at 10:43

## 2020-01-01 RX ADMIN — QUETIAPINE FUMARATE 25 MG: 25 TABLET ORAL at 05:40

## 2020-01-01 RX ADMIN — DOCUSATE SODIUM 50 MG AND SENNOSIDES 8.6 MG 2 TABLET: 8.6; 5 TABLET, FILM COATED ORAL at 09:17

## 2020-01-01 RX ADMIN — DIVALPROEX SODIUM 250 MG: 125 CAPSULE, COATED PELLETS ORAL at 22:51

## 2020-01-01 RX ADMIN — CYANOCOBALAMIN TAB 500 MCG 1000 MCG: 500 TAB at 05:24

## 2020-01-01 RX ADMIN — ACETAMINOPHEN 650 MG: 325 TABLET, FILM COATED ORAL at 05:28

## 2020-01-01 RX ADMIN — MORPHINE SULFATE 4 MG: 4 INJECTION INTRAVENOUS at 20:20

## 2020-01-01 RX ADMIN — DIVALPROEX SODIUM 250 MG: 125 CAPSULE, COATED PELLETS ORAL at 06:30

## 2020-01-01 RX ADMIN — QUETIAPINE FUMARATE 25 MG: 25 TABLET ORAL at 17:58

## 2020-01-01 RX ADMIN — QUETIAPINE FUMARATE 12.5 MG: 25 TABLET ORAL at 16:16

## 2020-01-01 RX ADMIN — DIVALPROEX SODIUM 250 MG: 125 CAPSULE, COATED PELLETS ORAL at 14:49

## 2020-01-01 RX ADMIN — DIVALPROEX SODIUM 250 MG: 125 CAPSULE, COATED PELLETS ORAL at 05:24

## 2020-01-01 RX ADMIN — QUETIAPINE FUMARATE 12.5 MG: 25 TABLET ORAL at 17:15

## 2020-01-01 RX ADMIN — DIVALPROEX SODIUM 125 MG: 125 CAPSULE ORAL at 08:30

## 2020-01-01 RX ADMIN — AMOXICILLIN AND CLAVULANATE POTASSIUM 1 TABLET: 875; 125 TABLET, FILM COATED ORAL at 13:17

## 2020-01-01 RX ADMIN — POTASSIUM CHLORIDE 10 MEQ: 7.46 INJECTION, SOLUTION INTRAVENOUS at 11:36

## 2020-01-01 RX ADMIN — ENALAPRILAT 1.25 MG: 1.25 INJECTION INTRAVENOUS at 23:24

## 2020-01-01 RX ADMIN — OMEPRAZOLE 20 MG: 20 CAPSULE, DELAYED RELEASE ORAL at 05:53

## 2020-01-01 RX ADMIN — QUETIAPINE FUMARATE 25 MG: 25 TABLET ORAL at 17:47

## 2020-01-01 RX ADMIN — LIDOCAINE 1 PATCH: 50 PATCH TOPICAL at 09:15

## 2020-01-01 RX ADMIN — NYSTATIN: 100000 CREAM TOPICAL at 05:08

## 2020-01-01 RX ADMIN — DOCUSATE SODIUM 50 MG AND SENNOSIDES 8.6 MG 2 TABLET: 8.6; 5 TABLET, FILM COATED ORAL at 17:33

## 2020-01-01 RX ADMIN — METOPROLOL SUCCINATE 25 MG: 25 TABLET, EXTENDED RELEASE ORAL at 05:06

## 2020-01-01 RX ADMIN — QUETIAPINE FUMARATE 25 MG: 25 TABLET ORAL at 05:29

## 2020-01-01 RX ADMIN — AMOXICILLIN AND CLAVULANATE POTASSIUM 1 TABLET: 875; 125 TABLET, FILM COATED ORAL at 06:15

## 2020-01-01 RX ADMIN — QUETIAPINE FUMARATE 25 MG: 25 TABLET ORAL at 17:35

## 2020-01-01 RX ADMIN — METOPROLOL SUCCINATE 25 MG: 25 TABLET, EXTENDED RELEASE ORAL at 08:28

## 2020-01-01 RX ADMIN — DOXYCYCLINE 100 MG: 100 TABLET, FILM COATED ORAL at 13:17

## 2020-01-01 RX ADMIN — DOXYCYCLINE 100 MG: 100 INJECTION, POWDER, LYOPHILIZED, FOR SOLUTION INTRAVENOUS at 06:16

## 2020-01-01 RX ADMIN — NYSTATIN: 100000 CREAM TOPICAL at 17:23

## 2020-01-01 RX ADMIN — LIDOCAINE 1 PATCH: 50 PATCH TOPICAL at 08:45

## 2020-01-01 RX ADMIN — ENOXAPARIN SODIUM 40 MG: 40 INJECTION SUBCUTANEOUS at 05:05

## 2020-01-01 RX ADMIN — CYANOCOBALAMIN 1000 MCG: 1000 INJECTION, SOLUTION INTRAMUSCULAR; SUBCUTANEOUS at 11:06

## 2020-01-01 RX ADMIN — QUETIAPINE FUMARATE 25 MG: 25 TABLET ORAL at 17:06

## 2020-01-01 RX ADMIN — LIDOCAINE 1 PATCH: 50 PATCH TOPICAL at 09:30

## 2020-01-01 RX ADMIN — DIVALPROEX SODIUM 250 MG: 125 CAPSULE, COATED PELLETS ORAL at 05:09

## 2020-01-01 RX ADMIN — METOPROLOL SUCCINATE 25 MG: 25 TABLET, EXTENDED RELEASE ORAL at 05:28

## 2020-01-01 RX ADMIN — POTASSIUM CHLORIDE 40 MEQ: 1500 TABLET, EXTENDED RELEASE ORAL at 14:18

## 2020-01-01 RX ADMIN — LIDOCAINE 1 PATCH: 50 PATCH TOPICAL at 08:33

## 2020-01-01 RX ADMIN — HEPARIN SODIUM 5000 UNITS: 5000 INJECTION, SOLUTION INTRAVENOUS; SUBCUTANEOUS at 01:43

## 2020-01-01 RX ADMIN — LIDOCAINE 1 PATCH: 50 PATCH TOPICAL at 10:36

## 2020-01-01 RX ADMIN — DOCUSATE SODIUM 50 MG AND SENNOSIDES 8.6 MG 2 TABLET: 8.6; 5 TABLET, FILM COATED ORAL at 05:28

## 2020-01-01 RX ADMIN — POTASSIUM CHLORIDE 10 MEQ: 7.46 INJECTION, SOLUTION INTRAVENOUS at 13:24

## 2020-01-01 RX ADMIN — DIVALPROEX SODIUM 250 MG: 125 CAPSULE, COATED PELLETS ORAL at 13:17

## 2020-01-01 RX ADMIN — DIVALPROEX SODIUM 125 MG: 125 CAPSULE ORAL at 14:18

## 2020-01-01 RX ADMIN — NYSTATIN: 100000 CREAM TOPICAL at 18:31

## 2020-01-01 RX ADMIN — QUETIAPINE FUMARATE 25 MG: 25 TABLET ORAL at 17:11

## 2020-01-01 RX ADMIN — AMPICILLIN AND SULBACTAM 3 G: 2; 1 INJECTION, POWDER, FOR SOLUTION INTRAVENOUS at 16:50

## 2020-01-01 RX ADMIN — DIVALPROEX SODIUM 250 MG: 125 CAPSULE, COATED PELLETS ORAL at 09:17

## 2020-01-01 RX ADMIN — LIDOCAINE 1 PATCH: 50 PATCH TOPICAL at 09:23

## 2020-01-01 RX ADMIN — LIDOCAINE 1 PATCH: 50 PATCH TOPICAL at 09:13

## 2020-01-01 RX ADMIN — QUETIAPINE FUMARATE 25 MG: 25 TABLET ORAL at 08:30

## 2020-01-01 RX ADMIN — DIVALPROEX SODIUM 125 MG: 125 CAPSULE ORAL at 05:53

## 2020-01-01 RX ADMIN — NYSTATIN: 100000 CREAM TOPICAL at 17:58

## 2020-01-01 RX ADMIN — OMEPRAZOLE 20 MG: 20 CAPSULE, DELAYED RELEASE ORAL at 05:30

## 2020-01-01 RX ADMIN — QUETIAPINE FUMARATE 25 MG: 25 TABLET ORAL at 17:00

## 2020-01-01 RX ADMIN — GADOTERIDOL 15 ML: 279.3 INJECTION, SOLUTION INTRAVENOUS at 18:46

## 2020-01-01 RX ADMIN — DOXYCYCLINE 100 MG: 100 INJECTION, POWDER, LYOPHILIZED, FOR SOLUTION INTRAVENOUS at 22:07

## 2020-01-01 RX ADMIN — DIVALPROEX SODIUM 250 MG: 125 CAPSULE, COATED PELLETS ORAL at 14:17

## 2020-01-01 RX ADMIN — ZIPRASIDONE MESYLATE 10 MG: 20 INJECTION, POWDER, LYOPHILIZED, FOR SOLUTION INTRAMUSCULAR at 22:38

## 2020-01-01 RX ADMIN — QUETIAPINE FUMARATE 25 MG: 25 TABLET ORAL at 17:36

## 2020-01-01 RX ADMIN — DIVALPROEX SODIUM 125 MG: 125 CAPSULE ORAL at 20:44

## 2020-01-01 RX ADMIN — ACETAMINOPHEN 650 MG: 325 TABLET, FILM COATED ORAL at 08:30

## 2020-01-01 RX ADMIN — OMEPRAZOLE 20 MG: 20 CAPSULE, DELAYED RELEASE ORAL at 05:09

## 2020-01-01 RX ADMIN — NYSTATIN: 100000 CREAM TOPICAL at 19:48

## 2020-01-01 RX ADMIN — IOHEXOL 80 ML: 350 INJECTION, SOLUTION INTRAVENOUS at 21:15

## 2020-01-01 RX ADMIN — SODIUM CHLORIDE: 9 INJECTION, SOLUTION INTRAVENOUS at 17:34

## 2020-01-01 RX ADMIN — DIVALPROEX SODIUM 250 MG: 125 CAPSULE, COATED PELLETS ORAL at 14:55

## 2020-01-01 RX ADMIN — DOCUSATE SODIUM 50 MG AND SENNOSIDES 8.6 MG 2 TABLET: 8.6; 5 TABLET, FILM COATED ORAL at 08:29

## 2020-01-01 RX ADMIN — QUETIAPINE FUMARATE 25 MG: 25 TABLET ORAL at 16:54

## 2020-01-01 RX ADMIN — OMEPRAZOLE 20 MG: 20 CAPSULE, DELAYED RELEASE ORAL at 05:40

## 2020-01-01 RX ADMIN — DIVALPROEX SODIUM 250 MG: 125 CAPSULE, COATED PELLETS ORAL at 04:33

## 2020-01-01 RX ADMIN — POTASSIUM CHLORIDE 10 MEQ: 7.46 INJECTION, SOLUTION INTRAVENOUS at 16:48

## 2020-01-01 RX ADMIN — DIVALPROEX SODIUM 250 MG: 125 CAPSULE, COATED PELLETS ORAL at 04:47

## 2020-01-01 RX ADMIN — ZIPRASIDONE MESYLATE 10 MG: 20 INJECTION, POWDER, LYOPHILIZED, FOR SOLUTION INTRAMUSCULAR at 18:48

## 2020-01-01 RX ADMIN — ZIPRASIDONE MESYLATE 10 MG: 20 INJECTION, POWDER, LYOPHILIZED, FOR SOLUTION INTRAMUSCULAR at 23:24

## 2020-01-01 RX ADMIN — QUETIAPINE FUMARATE 25 MG: 25 TABLET ORAL at 05:09

## 2020-01-01 RX ADMIN — OMEPRAZOLE 20 MG: 20 CAPSULE, DELAYED RELEASE ORAL at 04:34

## 2020-01-01 RX ADMIN — METOPROLOL SUCCINATE 25 MG: 25 TABLET, EXTENDED RELEASE ORAL at 05:53

## 2020-01-01 RX ADMIN — DIVALPROEX SODIUM 250 MG: 125 CAPSULE, COATED PELLETS ORAL at 21:46

## 2020-01-01 RX ADMIN — LIDOCAINE 1 PATCH: 50 PATCH TOPICAL at 09:18

## 2020-01-01 RX ADMIN — QUETIAPINE FUMARATE 12.5 MG: 25 TABLET ORAL at 05:58

## 2020-01-01 RX ADMIN — LIDOCAINE 1 PATCH: 50 PATCH TOPICAL at 07:56

## 2020-01-01 RX ADMIN — QUETIAPINE FUMARATE 25 MG: 25 TABLET ORAL at 07:57

## 2020-01-01 RX ADMIN — IOHEXOL 40 ML: 350 INJECTION, SOLUTION INTRAVENOUS at 10:42

## 2020-01-01 RX ADMIN — POTASSIUM CHLORIDE 10 MEQ: 7.46 INJECTION, SOLUTION INTRAVENOUS at 16:47

## 2020-01-01 RX ADMIN — ENOXAPARIN SODIUM 40 MG: 40 INJECTION SUBCUTANEOUS at 13:26

## 2020-01-01 RX ADMIN — OMEPRAZOLE 20 MG: 20 CAPSULE, DELAYED RELEASE ORAL at 04:47

## 2020-01-01 RX ADMIN — AMPICILLIN AND SULBACTAM 3 G: 2; 1 INJECTION, POWDER, FOR SOLUTION INTRAVENOUS at 06:19

## 2020-01-01 RX ADMIN — NYSTATIN: 100000 CREAM TOPICAL at 17:38

## 2020-01-01 RX ADMIN — SODIUM CHLORIDE: 9 INJECTION, SOLUTION INTRAVENOUS at 05:53

## 2020-01-01 RX ADMIN — OMEPRAZOLE 20 MG: 20 CAPSULE, DELAYED RELEASE ORAL at 08:28

## 2020-01-01 RX ADMIN — QUETIAPINE FUMARATE 12.5 MG: 25 TABLET ORAL at 05:24

## 2020-01-01 RX ADMIN — DIVALPROEX SODIUM 250 MG: 125 CAPSULE, COATED PELLETS ORAL at 13:51

## 2020-01-01 RX ADMIN — DOXYCYCLINE 100 MG: 100 TABLET, FILM COATED ORAL at 05:57

## 2020-01-01 RX ADMIN — QUETIAPINE FUMARATE 25 MG: 25 TABLET ORAL at 06:12

## 2020-01-01 RX ADMIN — OMEPRAZOLE 20 MG: 20 CAPSULE, DELAYED RELEASE ORAL at 05:37

## 2020-01-01 RX ADMIN — AMOXICILLIN AND CLAVULANATE POTASSIUM 1 TABLET: 875; 125 TABLET, FILM COATED ORAL at 16:16

## 2020-01-01 RX ADMIN — DIVALPROEX SODIUM 250 MG: 125 CAPSULE, COATED PELLETS ORAL at 14:32

## 2020-01-01 RX ADMIN — QUETIAPINE FUMARATE 25 MG: 25 TABLET ORAL at 17:40

## 2020-01-01 RX ADMIN — NYSTATIN: 100000 CREAM TOPICAL at 16:57

## 2020-01-01 RX ADMIN — ZIPRASIDONE MESYLATE 10 MG: 20 INJECTION, POWDER, LYOPHILIZED, FOR SOLUTION INTRAMUSCULAR at 14:20

## 2020-01-01 RX ADMIN — ZIPRASIDONE MESYLATE 10 MG: 20 INJECTION, POWDER, LYOPHILIZED, FOR SOLUTION INTRAMUSCULAR at 16:19

## 2020-01-01 RX ADMIN — DIVALPROEX SODIUM 250 MG: 125 CAPSULE, COATED PELLETS ORAL at 22:03

## 2020-01-01 RX ADMIN — OMEPRAZOLE 20 MG: 20 CAPSULE, DELAYED RELEASE ORAL at 05:05

## 2020-01-01 RX ADMIN — DIVALPROEX SODIUM 250 MG: 125 CAPSULE, COATED PELLETS ORAL at 21:15

## 2020-01-01 RX ADMIN — DIVALPROEX SODIUM 250 MG: 125 CAPSULE, COATED PELLETS ORAL at 23:06

## 2020-01-01 RX ADMIN — DIVALPROEX SODIUM 250 MG: 125 CAPSULE, COATED PELLETS ORAL at 05:05

## 2020-01-01 RX ADMIN — OMEPRAZOLE 20 MG: 20 CAPSULE, DELAYED RELEASE ORAL at 04:14

## 2020-01-01 RX ADMIN — DIVALPROEX SODIUM 250 MG: 125 CAPSULE, COATED PELLETS ORAL at 05:29

## 2020-01-01 RX ADMIN — SODIUM CHLORIDE: 9 INJECTION, SOLUTION INTRAVENOUS at 06:48

## 2020-01-01 RX ADMIN — METOPROLOL SUCCINATE 25 MG: 25 TABLET, EXTENDED RELEASE ORAL at 04:14

## 2020-01-01 RX ADMIN — METOPROLOL SUCCINATE 25 MG: 25 TABLET, EXTENDED RELEASE ORAL at 05:59

## 2020-01-01 RX ADMIN — DIVALPROEX SODIUM 250 MG: 125 CAPSULE, COATED PELLETS ORAL at 14:26

## 2020-01-01 RX ADMIN — LIDOCAINE 1 PATCH: 50 PATCH TOPICAL at 08:19

## 2020-01-01 RX ADMIN — NYSTATIN: 100000 CREAM TOPICAL at 04:56

## 2020-01-01 RX ADMIN — OMEPRAZOLE 20 MG: 20 CAPSULE, DELAYED RELEASE ORAL at 06:30

## 2020-01-01 RX ADMIN — QUETIAPINE FUMARATE 25 MG: 25 TABLET ORAL at 05:53

## 2020-01-01 RX ADMIN — DIVALPROEX SODIUM 250 MG: 125 CAPSULE, COATED PELLETS ORAL at 13:26

## 2020-01-01 RX ADMIN — SODIUM CHLORIDE: 9 INJECTION, SOLUTION INTRAVENOUS at 21:30

## 2020-01-01 RX ADMIN — OMEPRAZOLE 20 MG: 20 CAPSULE, DELAYED RELEASE ORAL at 09:17

## 2020-01-01 RX ADMIN — QUETIAPINE FUMARATE 25 MG: 25 TABLET ORAL at 04:47

## 2020-01-01 RX ADMIN — METOPROLOL SUCCINATE 25 MG: 25 TABLET, EXTENDED RELEASE ORAL at 09:18

## 2020-01-01 RX ADMIN — QUETIAPINE FUMARATE 25 MG: 25 TABLET ORAL at 09:19

## 2020-01-01 RX ADMIN — QUETIAPINE FUMARATE 25 MG: 25 TABLET ORAL at 04:34

## 2020-01-01 RX ADMIN — QUETIAPINE FUMARATE 25 MG: 25 TABLET ORAL at 18:04

## 2020-01-01 RX ADMIN — NYSTATIN: 100000 CREAM TOPICAL at 04:48

## 2020-01-01 RX ADMIN — MORPHINE SULFATE 4 MG: 4 INJECTION INTRAVENOUS at 23:57

## 2020-01-01 RX ADMIN — NYSTATIN: 100000 CREAM TOPICAL at 09:14

## 2020-01-01 RX ADMIN — DOCUSATE SODIUM 50 MG AND SENNOSIDES 8.6 MG 2 TABLET: 8.6; 5 TABLET, FILM COATED ORAL at 04:14

## 2020-01-01 RX ADMIN — DIVALPROEX SODIUM 250 MG: 125 CAPSULE, COATED PELLETS ORAL at 05:37

## 2020-01-01 RX ADMIN — LIDOCAINE 1 PATCH: 50 PATCH TOPICAL at 11:36

## 2020-01-01 RX ADMIN — KETOROLAC TROMETHAMINE 15 MG: 30 INJECTION, SOLUTION INTRAMUSCULAR at 01:43

## 2020-01-01 RX ADMIN — DOCUSATE SODIUM 50 MG AND SENNOSIDES 8.6 MG 2 TABLET: 8.6; 5 TABLET, FILM COATED ORAL at 05:53

## 2020-01-01 RX ADMIN — DIVALPROEX SODIUM 250 MG: 125 CAPSULE, COATED PELLETS ORAL at 05:40

## 2020-01-01 RX ADMIN — POTASSIUM CHLORIDE 20 MEQ: 1500 TABLET, EXTENDED RELEASE ORAL at 16:36

## 2020-01-01 RX ADMIN — QUETIAPINE FUMARATE 25 MG: 25 TABLET ORAL at 17:23

## 2020-01-01 RX ADMIN — AMOXICILLIN AND CLAVULANATE POTASSIUM 1 TABLET: 875; 125 TABLET, FILM COATED ORAL at 16:36

## 2020-01-01 RX ADMIN — DIVALPROEX SODIUM 250 MG: 125 CAPSULE, COATED PELLETS ORAL at 17:35

## 2020-01-01 RX ADMIN — QUETIAPINE FUMARATE 25 MG: 25 TABLET ORAL at 08:28

## 2020-01-01 RX ADMIN — ENOXAPARIN SODIUM 40 MG: 40 INJECTION SUBCUTANEOUS at 09:17

## 2020-01-01 RX ADMIN — QUETIAPINE FUMARATE 25 MG: 25 TABLET ORAL at 17:45

## 2020-01-01 RX ADMIN — QUETIAPINE FUMARATE 25 MG: 25 TABLET ORAL at 05:37

## 2020-01-01 RX ADMIN — OMEPRAZOLE 20 MG: 20 CAPSULE, DELAYED RELEASE ORAL at 05:58

## 2020-01-01 RX ADMIN — DIVALPROEX SODIUM 250 MG: 125 CAPSULE, COATED PELLETS ORAL at 21:17

## 2020-01-01 RX ADMIN — SODIUM CHLORIDE: 9 INJECTION, SOLUTION INTRAVENOUS at 17:46

## 2020-01-01 RX ADMIN — DIVALPROEX SODIUM 250 MG: 125 CAPSULE, COATED PELLETS ORAL at 20:23

## 2020-01-01 RX ADMIN — METOPROLOL SUCCINATE 25 MG: 25 TABLET, EXTENDED RELEASE ORAL at 05:24

## 2020-01-01 RX ADMIN — QUETIAPINE FUMARATE 25 MG: 25 TABLET ORAL at 05:06

## 2020-01-01 RX ADMIN — ZIPRASIDONE MESYLATE 10 MG: 20 INJECTION, POWDER, LYOPHILIZED, FOR SOLUTION INTRAMUSCULAR at 17:16

## 2020-01-01 RX ADMIN — DIVALPROEX SODIUM 250 MG: 125 CAPSULE, COATED PELLETS ORAL at 17:40

## 2020-01-01 RX ADMIN — DIVALPROEX SODIUM 250 MG: 125 CAPSULE, COATED PELLETS ORAL at 07:56

## 2020-01-01 RX ADMIN — OMEPRAZOLE 20 MG: 20 CAPSULE, DELAYED RELEASE ORAL at 06:12

## 2020-01-01 RX ADMIN — LIDOCAINE 1 PATCH: 50 PATCH TOPICAL at 09:37

## 2020-01-01 RX ADMIN — DIVALPROEX SODIUM 250 MG: 125 CAPSULE, COATED PELLETS ORAL at 15:52

## 2020-01-01 RX ADMIN — QUETIAPINE FUMARATE 25 MG: 25 TABLET ORAL at 17:14

## 2020-01-01 RX ADMIN — DIVALPROEX SODIUM 250 MG: 125 CAPSULE, COATED PELLETS ORAL at 13:21

## 2020-01-01 RX ADMIN — DIVALPROEX SODIUM 250 MG: 125 CAPSULE, COATED PELLETS ORAL at 15:04

## 2020-01-01 RX ADMIN — DEXTROSE AND SODIUM CHLORIDE: 5; 900 INJECTION, SOLUTION INTRAVENOUS at 16:50

## 2020-01-01 RX ADMIN — OMEPRAZOLE 20 MG: 20 CAPSULE, DELAYED RELEASE ORAL at 05:28

## 2020-01-01 RX ADMIN — POTASSIUM CHLORIDE 20 MEQ: 1500 TABLET, EXTENDED RELEASE ORAL at 11:35

## 2020-01-01 RX ADMIN — AMOXICILLIN AND CLAVULANATE POTASSIUM 1 TABLET: 875; 125 TABLET, FILM COATED ORAL at 05:56

## 2020-01-01 RX ADMIN — NYSTATIN: 100000 CREAM TOPICAL at 12:14

## 2020-01-01 RX ADMIN — OMEPRAZOLE 20 MG: 20 CAPSULE, DELAYED RELEASE ORAL at 05:24

## 2020-01-01 RX ADMIN — AMPICILLIN AND SULBACTAM 3 G: 2; 1 INJECTION, POWDER, FOR SOLUTION INTRAVENOUS at 01:04

## 2020-01-01 RX ADMIN — DIVALPROEX SODIUM 250 MG: 125 CAPSULE, COATED PELLETS ORAL at 08:28

## 2020-01-01 RX ADMIN — HEPARIN SODIUM 5000 UNITS: 5000 INJECTION, SOLUTION INTRAVENOUS; SUBCUTANEOUS at 11:06

## 2020-01-01 RX ADMIN — SODIUM CHLORIDE 500 ML: 9 INJECTION, SOLUTION INTRAVENOUS at 11:36

## 2020-01-01 RX ADMIN — DIVALPROEX SODIUM 250 MG: 125 CAPSULE, COATED PELLETS ORAL at 16:53

## 2020-01-01 RX ADMIN — CYANOCOBALAMIN TAB 500 MCG 1000 MCG: 500 TAB at 05:05

## 2020-01-01 RX ADMIN — DIVALPROEX SODIUM 250 MG: 125 CAPSULE, COATED PELLETS ORAL at 06:11

## 2020-01-01 RX ADMIN — CYANOCOBALAMIN TAB 500 MCG 1000 MCG: 500 TAB at 14:18

## 2020-01-01 RX ADMIN — DIVALPROEX SODIUM 250 MG: 125 CAPSULE, COATED PELLETS ORAL at 22:42

## 2020-01-01 RX ADMIN — AMOXICILLIN AND CLAVULANATE POTASSIUM 1 TABLET: 875; 125 TABLET, FILM COATED ORAL at 05:24

## 2020-01-01 RX ADMIN — DIVALPROEX SODIUM 250 MG: 125 CAPSULE, COATED PELLETS ORAL at 13:40

## 2020-01-01 RX ADMIN — OMEPRAZOLE 20 MG: 20 CAPSULE, DELAYED RELEASE ORAL at 07:57

## 2020-01-01 RX ADMIN — DIVALPROEX SODIUM 250 MG: 125 CAPSULE, COATED PELLETS ORAL at 13:34

## 2020-01-01 RX ADMIN — CYANOCOBALAMIN 1000 MCG: 1000 INJECTION, SOLUTION INTRAMUSCULAR; SUBCUTANEOUS at 13:26

## 2020-01-01 SDOH — HEALTH STABILITY: MENTAL HEALTH: HOW OFTEN DO YOU HAVE A DRINK CONTAINING ALCOHOL?: 4 OR MORE TIMES A WEEK

## 2020-01-01 SDOH — ECONOMIC STABILITY: FOOD INSECURITY: WITHIN THE PAST 12 MONTHS, YOU WORRIED THAT YOUR FOOD WOULD RUN OUT BEFORE YOU GOT MONEY TO BUY MORE.: NEVER TRUE

## 2020-01-01 SDOH — HEALTH STABILITY: MENTAL HEALTH: HOW MANY STANDARD DRINKS CONTAINING ALCOHOL DO YOU HAVE ON A TYPICAL DAY?: 1 OR 2

## 2020-01-01 SDOH — ECONOMIC STABILITY: FOOD INSECURITY: WITHIN THE PAST 12 MONTHS, THE FOOD YOU BOUGHT JUST DIDN'T LAST AND YOU DIDN'T HAVE MONEY TO GET MORE.: NEVER TRUE

## 2020-01-01 SDOH — ECONOMIC STABILITY: TRANSPORTATION INSECURITY
IN THE PAST 12 MONTHS, HAS LACK OF TRANSPORTATION KEPT YOU FROM MEETINGS, WORK, OR FROM GETTING THINGS NEEDED FOR DAILY LIVING?: NO

## 2020-01-01 SDOH — ECONOMIC STABILITY: TRANSPORTATION INSECURITY
IN THE PAST 12 MONTHS, HAS THE LACK OF TRANSPORTATION KEPT YOU FROM MEDICAL APPOINTMENTS OR FROM GETTING MEDICATIONS?: NO

## 2020-01-01 ASSESSMENT — ENCOUNTER SYMPTOMS
INSOMNIA: 0
WEAKNESS: 1
FOCAL WEAKNESS: 1
CONSTITUTIONAL NEGATIVE: 1
SENSORY CHANGE: 1
DEPRESSION: 0
BACK PAIN: 1
VOMITING: 0
HALLUCINATIONS: 0
ABDOMINAL PAIN: 0
TINGLING: 1
PALPITATIONS: 0
MYALGIAS: 1
PALPITATIONS: 0
GASTROINTESTINAL NEGATIVE: 1
BLURRED VISION: 0
TREMORS: 1
BLURRED VISION: 0
PSYCHIATRIC NEGATIVE: 1
COUGH: 0
VOMITING: 0
BLOOD IN STOOL: 0
SEIZURES: 1
ORTHOPNEA: 0
TREMORS: 0
SORE THROAT: 0
FALLS: 1
SPEECH CHANGE: 1
DOUBLE VISION: 0
ABDOMINAL PAIN: 0
HEADACHES: 0
HEADACHES: 0
SORE THROAT: 0
PND: 0
CONSTIPATION: 0
HEADACHES: 0
EYES NEGATIVE: 1
DEPRESSION: 0
CHILLS: 0
HEMOPTYSIS: 0
DIAPHORESIS: 0
NERVOUS/ANXIOUS: 0
LOSS OF CONSCIOUSNESS: 1
FEVER: 0
NAUSEA: 0
DOUBLE VISION: 0
DIZZINESS: 0
RESPIRATORY NEGATIVE: 1
PALPITATIONS: 0
COUGH: 0
WEAKNESS: 0
HEMOPTYSIS: 0
DEPRESSION: 0
ABDOMINAL PAIN: 0
SHORTNESS OF BREATH: 0
VOMITING: 0
COUGH: 0
SORE THROAT: 0
NAUSEA: 0
NAUSEA: 0
HEADACHES: 0
CLAUDICATION: 0

## 2020-01-01 ASSESSMENT — COGNITIVE AND FUNCTIONAL STATUS - GENERAL
SUGGESTED CMS G CODE MODIFIER DAILY ACTIVITY: CK
EATING MEALS: A LITTLE
STANDING UP FROM CHAIR USING ARMS: A LOT
CLIMB 3 TO 5 STEPS WITH RAILING: TOTAL
EATING MEALS: A LITTLE
SUGGESTED CMS G CODE MODIFIER DAILY ACTIVITY: CK
MOVING TO AND FROM BED TO CHAIR: A LOT
MOVING TO AND FROM BED TO CHAIR: A LOT
MOVING FROM LYING ON BACK TO SITTING ON SIDE OF FLAT BED: A LITTLE
PERSONAL GROOMING: A LITTLE
TOILETING: A LITTLE
TURNING FROM BACK TO SIDE WHILE IN FLAT BAD: A LOT
TURNING FROM BACK TO SIDE WHILE IN FLAT BAD: A LOT
WALKING IN HOSPITAL ROOM: A LOT
DRESSING REGULAR UPPER BODY CLOTHING: A LITTLE
CLIMB 3 TO 5 STEPS WITH RAILING: A LOT
MOVING FROM LYING ON BACK TO SITTING ON SIDE OF FLAT BED: A LOT
SUGGESTED CMS G CODE MODIFIER MOBILITY: CL
TOILETING: A LOT
DRESSING REGULAR LOWER BODY CLOTHING: A LITTLE
WALKING IN HOSPITAL ROOM: A LITTLE
SUGGESTED CMS G CODE MODIFIER DAILY ACTIVITY: CK
CLIMB 3 TO 5 STEPS WITH RAILING: A LOT
DAILY ACTIVITIY SCORE: 17
MOVING FROM LYING ON BACK TO SITTING ON SIDE OF FLAT BED: A LOT
DRESSING REGULAR LOWER BODY CLOTHING: A LOT
DAILY ACTIVITIY SCORE: 14
SUGGESTED CMS G CODE MODIFIER MOBILITY: CL
STANDING UP FROM CHAIR USING ARMS: A LITTLE
MOVING FROM LYING ON BACK TO SITTING ON SIDE OF FLAT BED: A LOT
MOBILITY SCORE: 12
MOVING TO AND FROM BED TO CHAIR: A LOT
HELP NEEDED FOR BATHING: A LOT
PERSONAL GROOMING: A LITTLE
CLIMB 3 TO 5 STEPS WITH RAILING: A LOT
HELP NEEDED FOR BATHING: A LITTLE
MOVING TO AND FROM BED TO CHAIR: A LOT
SUGGESTED CMS G CODE MODIFIER MOBILITY: CL
MOBILITY SCORE: 11
TURNING FROM BACK TO SIDE WHILE IN FLAT BAD: A LOT
STANDING UP FROM CHAIR USING ARMS: A LOT
WALKING IN HOSPITAL ROOM: A LOT
TURNING FROM BACK TO SIDE WHILE IN FLAT BAD: A LOT
HELP NEEDED FOR BATHING: A LOT
STANDING UP FROM CHAIR USING ARMS: A LOT
TOILETING: A LOT
MOBILITY SCORE: 15
WALKING IN HOSPITAL ROOM: A LOT
DRESSING REGULAR LOWER BODY CLOTHING: A LOT
DRESSING REGULAR UPPER BODY CLOTHING: A LITTLE
PERSONAL GROOMING: A LOT
SUGGESTED CMS G CODE MODIFIER MOBILITY: CK
DRESSING REGULAR UPPER BODY CLOTHING: A LOT
DAILY ACTIVITIY SCORE: 16
MOBILITY SCORE: 12

## 2020-01-01 ASSESSMENT — PATIENT HEALTH QUESTIONNAIRE - PHQ9
2. FEELING DOWN, DEPRESSED, IRRITABLE, OR HOPELESS: NOT AT ALL
1. LITTLE INTEREST OR PLEASURE IN DOING THINGS: NOT AT ALL
SUM OF ALL RESPONSES TO PHQ9 QUESTIONS 1 AND 2: 0
SUM OF ALL RESPONSES TO PHQ9 QUESTIONS 1 AND 2: 0
2. FEELING DOWN, DEPRESSED, IRRITABLE, OR HOPELESS: NOT AT ALL
2. FEELING DOWN, DEPRESSED, IRRITABLE, OR HOPELESS: NOT AT ALL
1. LITTLE INTEREST OR PLEASURE IN DOING THINGS: NOT AT ALL
2. FEELING DOWN, DEPRESSED, IRRITABLE, OR HOPELESS: NOT AT ALL
SUM OF ALL RESPONSES TO PHQ9 QUESTIONS 1 AND 2: 0
SUM OF ALL RESPONSES TO PHQ9 QUESTIONS 1 AND 2: 0
2. FEELING DOWN, DEPRESSED, IRRITABLE, OR HOPELESS: NOT AT ALL
SUM OF ALL RESPONSES TO PHQ9 QUESTIONS 1 AND 2: 0

## 2020-01-01 ASSESSMENT — FIBROSIS 4 INDEX
FIB4 SCORE: 3.32
FIB4 SCORE: 3.32
FIB4 SCORE: 1.85
FIB4 SCORE: 2.03

## 2020-01-01 ASSESSMENT — LIFESTYLE VARIABLES
CONSUMPTION TOTAL: NEGATIVE
ALCOHOL_USE: YES
TOTAL SCORE: 0
AVERAGE NUMBER OF DAYS PER WEEK YOU HAVE A DRINK CONTAINING ALCOHOL: 7
EVER_SMOKED: NEVER
HAVE YOU EVER FELT YOU SHOULD CUT DOWN ON YOUR DRINKING: NO
ON A TYPICAL DAY WHEN YOU DRINK ALCOHOL HOW MANY DRINKS DO YOU HAVE: 2
EVER FELT BAD OR GUILTY ABOUT YOUR DRINKING: NO
HOW MANY TIMES IN THE PAST YEAR HAVE YOU HAD 5 OR MORE DRINKS IN A DAY: 0
HAVE PEOPLE ANNOYED YOU BY CRITICIZING YOUR DRINKING: NO
TOTAL SCORE: 0
DOES PATIENT WANT TO STOP DRINKING: NO
TOTAL SCORE: 0
EVER HAD A DRINK FIRST THING IN THE MORNING TO STEADY YOUR NERVES TO GET RID OF A HANGOVER: NO

## 2020-01-01 ASSESSMENT — ACTIVITIES OF DAILY LIVING (ADL): TOILETING: INDEPENDENT

## 2020-01-01 ASSESSMENT — GAIT ASSESSMENTS
ASSISTIVE DEVICE: FRONT WHEEL WALKER
ASSISTIVE DEVICE: SINGLE POINT CANE
DISTANCE (FEET): 150
DEVIATION: SHUFFLED GAIT
DISTANCE (FEET): 40
GAIT LEVEL OF ASSIST: SUPERVISED
GAIT LEVEL OF ASSIST: MINIMAL ASSIST

## 2020-01-01 ASSESSMENT — COPD QUESTIONNAIRES
COPD SCREENING SCORE: 4
DO YOU EVER COUGH UP ANY MUCUS OR PHLEGM?: NO/ONLY WITH OCCASIONAL COLDS OR INFECTIONS
IN THE PAST 12 MONTHS DO YOU DO LESS THAN YOU USED TO BECAUSE OF YOUR BREATHING PROBLEMS: DISAGREE/UNSURE
DURING THE PAST 4 WEEKS HOW MUCH DID YOU FEEL SHORT OF BREATH: NONE/LITTLE OF THE TIME
HAVE YOU SMOKED AT LEAST 100 CIGARETTES IN YOUR ENTIRE LIFE: YES

## 2020-08-03 PROBLEM — S42.009A CLAVICULAR FRACTURE: Status: ACTIVE | Noted: 2020-01-01

## 2020-08-03 PROBLEM — R52 INTRACTABLE PAIN: Status: ACTIVE | Noted: 2020-01-01
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
## 2020-08-04 NOTE — DIETARY
"Nutrition services: Day 1 of admit.  Burt Curtis is a 88 y.o. male with admitting DX of intractable pain.  Consult received per nutrition admit screen; reported recent weight loss (MST score = 2).     Visited pt at bedside, pt appears adequately nourished. Pt finishing breakfast at time of RD visit, had already consumed > 75% of meal tray. Pt states that he has a good appetite. Declines supplement use but states that he typically consumes 3 meals per day - supplements likely not needed. When asked about weight trend, pt states that he feels his weight has been stable, reports a UBW = 160 lb.     Assessment:  Height: 177.8 cm (5' 10\")  Weight: 72.6 kg (160 lb) - stated weight only  Body mass index is 22.96 kg/m²., BMI classification: WNL  Diet/Intake: Cardiac; consumed > 75% of breakfast this morning    Evaluation:   1. Per H&P pt admitted d/t pain after GLF ~1 week ago  2. MAR: vitamin B-12, prilosec, pericolace  3. Labs: Na 133, glucose 120, iron 39  4. No measured weight of file at this time to make true assessment of weight hx    Malnutrition Risk: No significant indicators of malnutrition identified at this time.     Recommendations/Plan:  1. Please obtain a measured weight as able    2. Encourage intake of meals  3. Document intake of all meals as % taken in ADL's to provide interdisciplinary communication across all shifts.   4. Monitor weight.  5. Nutrition rep will continue to see patient for ongoing meal and snack preferences.  6. RD to monitor PO intake, wt trends, and nutrition labs/meds    RD to follow           "

## 2020-08-04 NOTE — CARE PLAN
Problem: Safety  Goal: Will remain free from injury  Outcome: PROGRESSING AS EXPECTED  Goal: Will remain free from falls  Outcome: PROGRESSING AS EXPECTED    Problem: Infection  Goal: Will remain free from infection  Outcome: PROGRESSING AS EXPECTED

## 2020-08-04 NOTE — THERAPY
Physical Therapy   Initial Evaluation     Patient Name: Burt Curtis  Age:  88 y.o., Sex:  male  Medical Record #: [id number]  Today's Date: 8/4/2020     Precautions: Fall Risk, Non Weight Bearing Left Upper Extremity, Sling Left Upper Extremity    Assessment  Patient is 88 y.o. male admitted on 8/3/2020 s/p GLF1 week ago resulting in left upper chest and shoulder pain. Pt is L UE sling. No official WB orders, so conservative NWBing in L UE for PT eval. Pain well-managed t/o tx. Pt is high fall risk requiring assist for all mobility. Per EMR, pt has had multiple falls. PT will follow during acute stay. Recommend post-acute rehab prior to discharge to higher level of care.    Plan    Recommend Physical Therapy 3 times per week until therapy goals are met for the following treatments:  Bed Mobility, Equipment, Gait Training, Neuro Re-Education / Balance, Therapeutic Activities and Therapeutic Exercises    Discharge recommendations:  Recommend post-acute placement for continued physical therapy services prior to discharge home.         Subjective    Pt agrees to PT.      Objective       08/04/20 1332   Prior Living Situation   Prior Services Meals on Wheels;Skilled Home Health Services   Housing / Facility 1 Story House   Equipment Owned Single Point Cane   Lives with - Patient's Self Care Capacity Spouse   Prior Level of Functional Mobility   Bed Mobility Independent   Transfer Status Independent   Ambulation Independent   Assistive Devices Used Single Point Cane   Stairs Unable To Determine At This Time   Comments Pt is poor historian   Cognition    Speech/ Communication Hard of Hearing   Orientation Level Not Oriented to Month;Not Oriented to Day;Not Oriented to Time;Not Oriented to Place;Not Oriented to Reason   Level of Consciousness Confused   Ability To Follow Commands 1 Step   Safety Awareness Impaired   New Learning Impaired   Attention Impaired   Comments Cooperative   Passive ROM Lower Body   Passive ROM Lower  "Body WDL   Active ROM Lower Body    Active ROM Lower Body  WDL   Strength Lower Body   Gross Strength Generalized Weakness, Equal Bilaterally   Lower Body Muscle Tone   Lower Body Muscle Tone  WDL   Balance Assessment   Sitting Balance (Static) Fair +   Sitting Balance (Dynamic) Fair   Standing Balance (Static) Fair   Standing Balance (Dynamic) Poor +   Weight Shift Sitting Fair   Weight Shift Standing Poor   Comments with R SPC   Gait Analysis   Gait Level Of Assist Minimal Assist   Assistive Device Single Point Cane   Distance (Feet) 150   Deviation Shuffled Gait;Decreased Base Of Support;Decreased Toe Off;Decreased Heel Strike   Skilled Intervention Verbal Cuing;Sequencing;Facilitation;Compensatory Strategies;Tactile Cuing   Comments Cues for \"big steps\"   Functional Mobility   Sit to Stand Minimal Assist   Bed, Chair, Wheelchair Transfer Minimal Assist   Mobility gait in kaplan   Patient / Family Goals    Patient / Family Goal #1 none stated   Short Term Goals    Short Term Goal # 1 Pt will be SPV for all transfers with LRAD in 6 txs to improve functional mobility.   Short Term Goal # 2 Pt will be SPV for gait >150' with LRAD in 6 txs to improve functional mobility.   Education Group   Role of Physical Therapist Patient Response Patient;Acceptance;Explanation;Demonstration;Reinforcement Needed;Action Demonstration   Problem List    Problems Impaired Bed Mobility;Impaired Transfers;Impaired Ambulation;Functional Strength Deficit;Impaired Balance;Safety Awareness Deficits / Cognition   Anticipated Discharge Equipment   DC Equipment Unable To Determine At This Time         "

## 2020-08-04 NOTE — CARE PLAN
Problem: Communication  Goal: The ability to communicate needs accurately and effectively will improve  Outcome: PROGRESSING AS EXPECTED     Problem: Safety  Goal: Will remain free from injury  Outcome: PROGRESSING AS EXPECTED  Goal: Will remain free from falls  Outcome: PROGRESSING AS EXPECTED     Problem: Discharge Barriers/Planning  Goal: Patient's continuum of care needs will be met  Outcome: PROGRESSING AS EXPECTED     Problem: Pain Management  Goal: Pain level will decrease to patient's comfort goal  Outcome: PROGRESSING AS EXPECTED     Problem: Respiratory:  Goal: Respiratory status will improve  Outcome: PROGRESSING AS EXPECTED

## 2020-08-04 NOTE — ED PROVIDER NOTES
ED Provider Note    Scribed for Janak Stover M.D. by Martha Fernandez. 8/3/2020  7:32 PM    Primary care provider: Ant Byrnes M.D.  Means of arrival: EMS  History obtained from: Patient  History limited by: None    CHIEF COMPLAINT  Chief Complaint   Patient presents with   • GLF     pt had GLF 1 week ago unknown LOC; c/o L sided chest wall pain and L arm and shoulder pain; blood glucose 170 per EMS       HPI  Burt Curtis is a 88 y.o. male who presents to the Emergency Department via ambulance for a ground level fall that resulted from tripping on something in his kitchen 3 weeks ago resulting in severe left shoulder pain. He states that the pain has been gradually worsening and the severe pain is what prompted his ED visit today. He denies loss of consciousness, hitting his head, and abdominal pain. The patient is unsure if his tetanus shot is up to date.     REVIEW OF SYSTEMS  Pertinent positives include: left shoulder pain. Pertinent negatives include:  loss of consciousness, hitting his head, and abdominal pain. See history of present illness. All other systems are negative.     PAST MEDICAL HISTORY   has a past medical history of Hyperlipidemia and Hypertension.    SURGICAL HISTORY  patient denies any surgical history    SOCIAL HISTORY  Social History     Tobacco Use   • Smoking status: Former Smoker   • Smokeless tobacco: Never Used   Substance Use Topics   • Alcohol use: Yes     Alcohol/week: 6.0 - 8.4 oz     Types: 10 - 14 Glasses of wine per week     Frequency: 4 or more times a week     Drinks per session: 1 or 2   • Drug use: Never      Social History     Substance and Sexual Activity   Drug Use Never       FAMILY HISTORY  History reviewed. No pertinent family history.    CURRENT MEDICATIONS  Current Outpatient Medications   Medication Instructions   • aspirin 81 mg, DAILY   • atorvastatin (LIPITOR) 30 mg, Oral, EVERY BEDTIME   • folic acid (FOLVITE) 400 mcg, DAILY   • metoprolol SR (TOPROL XL)  "25 MG TB24 Take 1 tablet by mouth daily. Please make an appointment for further refills, thank you.   • Multiple Vitamins-Minerals (OCUVITE-LUTEIN PO) 2 TIMES DAILY   • omeprazole (PRILOSEC) 20 mg, DAILY   • oxycodone-acetaminophen (PERCOCET) 5-325 MG TABS 1-2 Tabs, Oral, EVERY 4 HOURS PRN   • Vitamin D 2,000 Units, DAILY         ALLERGIES  Allergies   Allergen Reactions   • Demerol Anxiety       PHYSICAL EXAM  VITAL SIGNS: /94   Pulse 95   Temp 37.3 °C (99.1 °F) (Temporal)   Resp 17   Ht 1.778 m (5' 10\")   Wt 72.6 kg (160 lb)   SpO2 95%   BMI 22.96 kg/m²     Constitutional: Alert in no apparent distress.  HENT: No signs of trauma, Bilateral external ears normal, Nose normal. Uvula midline.   Eyes: Pupils are equal and reactive, Conjunctiva normal, Non-icteric.   Neck: Normal range of motion, No tenderness, Supple, No stridor.   Lymphatic: No lymphadenopathy noted.   Cardiovascular: Regular rate and rhythm, no murmurs.   Thorax & Lungs: Tachypnea, Normal breath sounds, No respiratory distress, No wheezing, No chest tenderness.   Abdomen:  Soft, No tenderness, No peritoneal signs, No masses, No pulsatile masses.   Skin: Warm, Dry, No erythema, No rash.   Back: No bony tenderness, No CVA tenderness.   Extremities: Intact distal pulses, No edema, No tenderness, No cyanosis.  Musculoskeletal: Good range of motion in all major joints. No tenderness to palpation or major deformities noted.   Neurologic: Alert , Normal motor function, Normal sensory function, No focal deficits noted.   Psychiatric: Affect normal, Judgment normal, Mood normal.     Normal radial, median, and ulnar nerve function bilaterally. Less than 3 second capillary refill and 2+ peripheral pulses bilaterally. Normal flexion and extension.  SILT distally.    No C/T/L spine step-offs or deformities, No C/T/L spine tenderness to palpation.  No C/T/L spine step-offs or deformities, No C/T/L spine tenderness to palpation.    No hx or PE e/o ICH, "   Pt w/ no midline c/s pain and [place] c/s rule neg  Scanning chest due to bruising on chest  No C/T/L spine TTP, doubt fx  No obvious extremity injury  No intrathoracic or abd pain to suggest PTX, rib fx or BAT  Pt ambulates w/o assistance and w/ steady gait prior to d/c  Pt tolerating PO prior to dc    DIAGNOSTIC STUDIES / PROCEDURES    LABS  Labs Reviewed   CBC WITH DIFFERENTIAL - Abnormal; Notable for the following components:       Result Value    RBC 4.16 (*)     .3 (*)     MCH 37.0 (*)     RDW 51.7 (*)     Lymphocytes 15.00 (*)     Monocytes 15.40 (*)     Monos (Absolute) 1.29 (*)     All other components within normal limits   BASIC METABOLIC PANEL - Abnormal; Notable for the following components:    Glucose 102 (*)     All other components within normal limits   TROPONIN - Abnormal; Notable for the following components:    Troponin T 38 (*)     All other components within normal limits   ESTIMATED GFR   VITAMIN B12   FOLATE   COVID/SARS COV-2   SARS-COV-2, PCR (IN-HOUSE)      All labs reviewed by me.    EKG  12 Lead EKG interpreted by me to show:  Indication: Arrhythmia  Normal sinus rhythm  Rate 98  Axis: Normal  Intervals: Normal  Normal T waves  Normal ST segments  My impression of this EKG: No STEMI.     RADIOLOGY  CT-CHEST (THORAX) WITH   Final Result         1.  Bilateral peripheral reticular pulmonary opacities, appearance suggests component of fibrosis.   2.  Bilateral dependent atelectasis is seen.   3.  Left clavicular fracture, appears likely subacute.   4.  Atherosclerosis and atherosclerotic coronary artery disease      Fleischner Society pulmonary nodule recommendations:         CT-HEAD W/O   Final Result         1.  Subtle area of low-density in the right parieto-occipital subcortical white matter, could represent vasogenic edema. Recommend further characterization with contrast-enhanced CT head or MRI to exclude intracranial mass lesion.   2.  Nonspecific white matter changes,  commonly associated with small vessel ischemic disease.  Associated mild cerebral atrophy is noted.   3.  Atherosclerosis.      DX-SHOULDER 2+ LEFT   Final Result         1.  No acute traumatic bony injury.   2.  Degenerative changes of the acromioclavicular and glenohumeral joints.           The radiologist's interpretation of all radiological studies have been reviewed by me.    COURSE & MEDICAL DECISION MAKING  Nursing notes, VS, PMSFHx reviewed in chart.    88 y.o. male p/w chief complaint of a ground level fall resulting in left shoulder pain.    7:32 PM Patient seen and examined at bedside.      I verified that the patient was wearing a mask and I was wearing appropriate PPE every time I entered the room. The patient's mask was on the patient at all times during my encounter except for a brief view of the oropharynx.     The differential diagnoses include but are not limited to:     CT head and chest obtained by me given age and significant pain and bruising  Patient given multiple doses of morphine for pain control in emergency department  Abnormal CT head discussed with hospitalist who agrees with admit for PT OT and potential scheduling of MRI as outpatient versus obtaining MRI as inpatient    EKG obtained by me given chest pain  Patient with mildly elevated troponin would consider trending as inpatient.    Patient with 2+ peripheral pulses on affected left upper extremity.     11:06 PM Paged hospitalist.     11:10 PM - Patient was reevaluated at bedside. Discussed lab and radiology results with the  and informed them of the plan of care.    11:11 PM - I discussed the patient's case and the above findings with Dr. De La Cruz (Hospitalist).      11:13 PM - Paged Ortho.  Discussed plan with orthopedic surgeon who agrees to have patient follow-up as outpatient          FINAL IMPRESSION  1. Fall, initial encounter    2. Closed nondisplaced fracture of acromial end of left clavicle, initial encounter    3. Abnormal CT  of brain          Martha CAREY (Scribe), am scribing for, and in the presence of, Janak Stover M.D..    Electronically signed by: Martha Fernandez (Scribdejah), 8/3/2020    IJanak M.D. personally performed the services described in this documentation, as scribed by Martha Fernandez in my presence, and it is both accurate and complete. C.    The note accurately reflects work and decisions made by me.  Janak Stover M.D.  8/4/2020  3:48 AM

## 2020-08-04 NOTE — PROGRESS NOTES
"Pt is A&Ox3- disorientated to event. Pt unable to state why pt is here, when reminded pt states, \"oh I knew that.\" VSS. Lungs clear diminished on RA. Pt c/o 6/10 pain, Toradol given with good relief. Ortho traction applied sling ot L/t shoulder. Pt ambulated from stretcher to bed with the help of 3 staff. Pt unsteady and has a unstable shuffle gait. LBM 8/2 per pt. When questioned about home medications, pt states, \"no I haven't taken some of those in a long time. I don't take any medications at home.\" Pharmacy updated. Safety, falls, and mediation education given. Bed low and locked, call light within reach, bed alarm on, hourly rounding in place, WCM.   "

## 2020-08-04 NOTE — PROGRESS NOTES
2 RN skin check complete with AYE Gauthier.   Devices in place PIV, shoulder sling.  Skin assessed under devices yes.  Confirmed pressure ulcers found on n/a.  New potential pressure ulcers noted on n/a. Wound consult placed n/a.    The following interventions in place 2 RN skin check upon admission, encourage repositioning and turning with help of staff/pillows. Redistributing mattress. Moisturizer.     Skin Assessment:   -BIlat ears: pink/blanching  -BUE: bruising and abrasions, fragile skin.   -L/t shoulder: purple, yellow bruising to L/t axillary up to L/t shoulder and upper back.   -BLE: bruising/ abrasions to knees  -Bilat heels: pink/blanching  -Sacrum: pink/blanching

## 2020-08-04 NOTE — ED TRIAGE NOTES
Burt Curtis  88 y.o.  Chief Complaint   Patient presents with   • GLF     pt had GLF 1 week ago unknown LOC; c/o L sided chest wall pain and L arm and shoulder pain; blood glucose 170 per EMS

## 2020-08-04 NOTE — ASSESSMENT & PLAN NOTE
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   - case was discussed with Orthopedic surgery  - supportive care  - palliative care following, continue with comfort care measures  - hospice referral placed

## 2020-08-04 NOTE — H&P
Hospital Medicine History & Physical Note    Date of Service  8/4/2020    Primary Care Physician  Ant Byrnes M.D.    Code Status  Full Code    Chief Complaint  Chief Complaint   Patient presents with   • GLF     pt had GLF 1 week ago unknown LOC; c/o L sided chest wall pain and L arm and shoulder pain; blood glucose 170 per EMS       History of Presenting Illness  88 y.o. male who presented 8/3/2020 with a mechanical ground-level fall approximately 1 week ago resulting in left upper chest and shoulder pain.  He deferred medical evaluation until tonight when he is found to have a large ecchymosis to his upper left chest and exquisite uncontrolled pain at rest.  He admits several recent ground-level falls though denies dizziness upon standing but admits to generalized weakness.  He denies taking any medications.  He lives at home with his wife who also suffers from recurrent falls.     review of Systems  Review of Systems   Constitutional: Negative.    HENT: Negative.    Eyes: Negative.    Respiratory: Negative.    Cardiovascular: Positive for chest pain. Negative for palpitations, orthopnea, claudication, leg swelling and PND.   Gastrointestinal: Negative.    Genitourinary: Negative.    Musculoskeletal: Positive for back pain, falls and myalgias.   Skin: Negative.    Neurological: Positive for tingling, tremors, sensory change, speech change, focal weakness, seizures, loss of consciousness and weakness. Negative for dizziness and headaches.   Endo/Heme/Allergies: Negative.    Psychiatric/Behavioral: Negative.        Past Medical History   has a past medical history of Hyperlipidemia and Hypertension.    Surgical History   has no past surgical history on file.     Family History  family history is not on file.     Social History   reports that he has quit smoking. He has never used smokeless tobacco. He reports current alcohol use of about 6.0 - 8.4 oz of alcohol per week. He reports that he does not use  drugs.    Allergies  Allergies   Allergen Reactions   • Demerol Anxiety       Medications  Prior to Admission Medications   Prescriptions Last Dose Informant Patient Reported? Taking?   Cholecalciferol (VITAMIN D) 2000 UNIT CAPS   Yes No   Sig: Take 2,000 Units by mouth every day.   Multiple Vitamins-Minerals (OCUVITE-LUTEIN PO)   Yes No   Sig: Take  by mouth 2 Times a Day.   aspirin 81 MG tablet   Yes No   Sig: Take 81 mg by mouth every day.   atorvastatin (LIPITOR) 10 MG TABS   No No   Sig: Take 3 Tabs by mouth every bedtime.   folic acid (FOLVITE) 400 MCG tablet   Yes No   Sig: Take 400 mcg by mouth every day.   metoprolol SR (TOPROL XL) 25 MG TB24   No No   Sig: Take 1 tablet by mouth daily. Please make an appointment for further refills, thank you.   omeprazole (PRILOSEC) 20 MG CPDR   Yes No   Sig: Take 20 mg by mouth every day.   oxycodone-acetaminophen (PERCOCET) 5-325 MG TABS   No No   Sig: Take 1-2 Tabs by mouth every four hours as needed (pain).      Facility-Administered Medications: None       Physical Exam  Temp:  [37.3 °C (99.1 °F)-37.8 °C (100.1 °F)] 37.8 °C (100.1 °F)  Pulse:  [] 89  Resp:  [16-21] 16  BP: (131-157)/(76-94) 146/83  SpO2:  [92 %-95 %] 95 %    Physical Exam  Vitals signs and nursing note reviewed.   Constitutional:       General: He is in acute distress.      Appearance: He is normal weight. He is not ill-appearing, toxic-appearing or diaphoretic.   HENT:      Head: Normocephalic and atraumatic.      Mouth/Throat:      Mouth: Mucous membranes are moist.      Pharynx: Oropharynx is clear.   Eyes:      Extraocular Movements: Extraocular movements intact.      Conjunctiva/sclera: Conjunctivae normal.      Pupils: Pupils are equal, round, and reactive to light.   Neck:      Musculoskeletal: Normal range of motion and neck supple.   Cardiovascular:      Rate and Rhythm: Normal rate and regular rhythm.      Pulses: Normal pulses.      Heart sounds: Murmur present. No gallop.     Pulmonary:      Effort: Pulmonary effort is normal. No respiratory distress.      Breath sounds: Normal breath sounds. No wheezing or rales.   Chest:      Chest wall: Tenderness present.          Comments: Large multicolored ecchymosis with exquisite tenderness over the clavicle  Abdominal:      General: Abdomen is flat. Bowel sounds are normal. There is no distension.      Palpations: There is no mass.      Tenderness: There is no abdominal tenderness. There is no guarding.   Musculoskeletal:         General: Tenderness and deformity present.   Skin:     General: Skin is warm and dry.      Capillary Refill: Capillary refill takes 2 to 3 seconds.      Coloration: Skin is not jaundiced or pale.      Findings: Bruising present. No lesion.   Neurological:      General: No focal deficit present.      Mental Status: He is alert and oriented to person, place, and time. Mental status is at baseline.   Psychiatric:         Mood and Affect: Mood normal.         Behavior: Behavior normal.         Thought Content: Thought content normal.         Judgment: Judgment normal.         Laboratory:  Recent Labs     08/03/20 2021   WBC 8.4   RBC 4.16*   HEMOGLOBIN 15.4   HEMATOCRIT 44.2   .3*   MCH 37.0*   MCHC 34.8   RDW 51.7*   PLATELETCT 169   MPV 10.0     Recent Labs     08/03/20 2021   SODIUM 138   POTASSIUM 4.0   CHLORIDE 98   CO2 26   GLUCOSE 102*   BUN 9   CREATININE 0.62   CALCIUM 8.7     Recent Labs     08/03/20 2021   GLUCOSE 102*         No results for input(s): NTPROBNP in the last 72 hours.      Recent Labs     08/03/20 2021   TROPONINT 38*       Imaging:  CT-CHEST (THORAX) WITH   Final Result         1.  Bilateral peripheral reticular pulmonary opacities, appearance suggests component of fibrosis.   2.  Bilateral dependent atelectasis is seen.   3.  Left clavicular fracture, appears likely subacute.   4.  Atherosclerosis and atherosclerotic coronary artery disease      Fleischner Society pulmonary nodule  recommendations:         CT-HEAD W/O   Final Result         1.  Subtle area of low-density in the right parieto-occipital subcortical white matter, could represent vasogenic edema. Recommend further characterization with contrast-enhanced CT head or MRI to exclude intracranial mass lesion.   2.  Nonspecific white matter changes, commonly associated with small vessel ischemic disease.  Associated mild cerebral atrophy is noted.   3.  Atherosclerosis.      DX-SHOULDER 2+ LEFT   Final Result         1.  No acute traumatic bony injury.   2.  Degenerative changes of the acromioclavicular and glenohumeral joints.               Assessment/Plan:  I anticipate this patient will require at least two midnights for appropriate medical management, necessitating inpatient admission.    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  Acetaminophen, Toradol as needed, follow-up PT, OT and Ortho consult    Clavicular fracture  Assessment & Plan  Please see #1    Fall  Assessment & Plan  Gait disorder with recurrent falls ambulating with his wife's cane.  please see #1

## 2020-08-04 NOTE — DISCHARGE PLANNING
Anticipated Discharge Disposition: long term placement    Action: Wife Sumi Curtis currently admitted at [facility], MRN [id number]. Wife has severe dementia and multiple falls. Friend Kisha [phone]. Kisha states pt has memory issues and unsafe to return home. Provided Kisha w/ resources for care home's for both pt and wife.      Barriers to Discharge: long term placement    Plan: TBD

## 2020-08-04 NOTE — CARE PLAN
Problem: Nutritional:  Goal: Achieve adequate nutritional intake  Description: Patient will continue to consume >50% of meals  Outcome: PROGRESSING AS EXPECTED   See dietary note. RD following.

## 2020-08-05 PROBLEM — F03.918 DEMENTIA WITH BEHAVIORAL DISTURBANCE (HCC): Status: ACTIVE | Noted: 2020-01-01

## 2020-08-05 PROBLEM — E87.1 HYPONATREMIA: Status: ACTIVE | Noted: 2020-01-01

## 2020-08-05 PROBLEM — D53.9 MACROCYTIC ANEMIA: Status: ACTIVE | Noted: 2020-01-01

## 2020-08-05 PROBLEM — G93.6 VASOGENIC EDEMA (HCC): Status: ACTIVE | Noted: 2020-01-01

## 2020-08-05 PROBLEM — E53.8 VITAMIN B12 DEFICIENCY: Status: ACTIVE | Noted: 2020-01-01

## 2020-08-05 NOTE — PROGRESS NOTES
This is a 88-year-old male with past medical history significant for hyperlipidemia, hypertension presented to ER on 8/4/2020 after he had a ground-level fall.  It is noted that patient had a ground-level fall 1 week ago, complaint of left-sided chest wall pain, left arm and shoulder pain.  He reports having said a ground-level fall, denied any dizziness.    Upon presentation in ER, he is noted to have macrocytosis with MCV of 106 23, mildly elevated troponin at 38.  CT chest abdomen showed left clavicular fracture appears likely subacute.  ER.  Discussed with orthopedic surgery recommended following up as an outpatient.      CT head showed subtle area of low density in the right parieto-occipital subcortical white matter could represent vasogenic edema, MRI of brain with and without contrast ordered.  Will provide neurochecks every 4 hours, on aspiration, fall, seizure precaution.    Considering macrocytosis vitamin B12 was obtained noted to be low, will provide vitamin B12 thousand MCG p.o. daily; mildly elevated TSH at 7s , will start synthroid 25 mcg po qd.    Elevated troponin at 38, repeat 27.  Echo, EKG did not show any ST and T wave changes.  Obtain orthostatic vitals

## 2020-08-05 NOTE — PROGRESS NOTES
Pt A&Ox1 to self only this morning; room air. Pt placed on restraints this morning d/t to pt attempt to pull out lines and making unsafe attempts to get out of bed. Active order for restraints in place. Pt only family listed on chart is wife, who is currently hospitalized with dementia therefore no family was notified of pt restraints (charge nurse Jacki aware of situation). Pt was educated on reason for restraints and required conditions in order to remove restraints. Pt is q4hr neuro check. Assessments complete. POC discussed with pt; all questions answered at this time. Fall precautions in place. Mobility sign on door. Bed-alarm on. Call light within reach. Pt educated regarding fall prevention. Hourly rounding in place along with restraint checks.

## 2020-08-05 NOTE — PROGRESS NOTES
[facility] Medicine Daily Progress Note    Date of Service  8/5/2020    Chief Complaint  88 y.o. male admitted 8/3/2020 with   Chief Complaint   Patient presents with   • GLF     pt had GLF 1 week ago unknown LOC; c/o L sided chest wall pain and L arm and shoulder pain; blood glucose 170 per EMS         Hospital Course  This is a 88-year-old male with past medical history significant for hyperlipidemia, hypertension presented to ER on 8/4/2020 after he had a ground-level fall.  It is noted that patient had a ground-level fall 1 week ago, complaint of left-sided chest wall pain, left arm and shoulder pain.  He reports having said a ground-level fall, denied any dizziness.     Upon presentation in ER, he is noted to have macrocytosis with MCV of 106 , mildly elevated troponin at 38.  CT chest abdomen showed left clavicular fracture appears likely subacute.  ER.  Discussed with orthopedic surgery recommended following up as an outpatient.       CT head showed subtle area of low density in the right parieto-occipital subcortical white matter could represent vasogenic edema, MRI of brain with and without contrast ordered.  Will provide neurochecks every 4 hours, on aspiration, fall, seizure precaution.     Considering macrocytosis vitamin B12 was obtained noted to be low, will provide vitamin B12 thousand MCG p.o. daily; mildly elevated TSH at 7s , will start synthroid 25 mcg po qd.     Elevated troponin at 38, repeat 27.  Echo, EKG did not show any ST and T wave changes.  Obtain orthostatic vitals        Interval Problem Update  No acute events overnight.  Early in the morning patient noted to be agitated, placed on restrain  --- Troponin trending down, echo did not show any wall motion abnormality, EF 70%, moderate aortic stenosis  ---  Consultants/Specialty  None     Code Status  Full Code    Disposition  TBD    Review of Systems  Review of Systems   Constitutional: Negative for chills and fever.   HENT: Negative for  sore throat.    Eyes: Negative for blurred vision and double vision.   Respiratory: Negative for cough and hemoptysis.    Cardiovascular: Negative for chest pain and palpitations.   Gastrointestinal: Negative for abdominal pain, nausea and vomiting.   Genitourinary: Negative for dysuria and urgency.   Musculoskeletal: Positive for joint pain (shoulder pain).   Neurological: Negative for headaches.   Psychiatric/Behavioral: Negative for depression.        Physical Exam  Temp:  [36.2 °C (97.2 °F)-36.8 °C (98.2 °F)] 36.8 °C (98.2 °F)  Pulse:  [65-72] 72  Resp:  [16-18] 18  BP: ()/(50-82) 156/76  SpO2:  [95 %-96 %] 95 %    Physical Exam  Vitals signs and nursing note reviewed.   Constitutional:       Appearance: Normal appearance.   HENT:      Head: Normocephalic and atraumatic.   Eyes:      Extraocular Movements: Extraocular movements intact.   Neck:      Musculoskeletal: Neck supple.   Cardiovascular:      Rate and Rhythm: Normal rate and regular rhythm.   Pulmonary:      Effort: Pulmonary effort is normal.      Comments: Decreased breath sound at the bases  Abdominal:      General: Abdomen is flat. Bowel sounds are normal. There is distension.      Palpations: Abdomen is soft.   Musculoskeletal:         General: No swelling.   Skin:     General: Skin is warm.   Neurological:      Mental Status: He is alert.      Cranial Nerves: No cranial nerve deficit.      Comments: Alert and orinented x 2-3   Psychiatric:         Mood and Affect: Mood normal.         Fluids    Intake/Output Summary (Last 24 hours) at 8/5/2020 1432  Last data filed at 8/5/2020 0800  Gross per 24 hour   Intake --   Output 625 ml   Net -625 ml       Laboratory  Recent Labs     08/03/20 2021 08/04/20  0906 08/05/20  0239   WBC 8.4 7.6 5.5   RBC 4.16* 4.12* 3.76*   HEMOGLOBIN 15.4 15.3 13.7*   HEMATOCRIT 44.2 43.9 39.8*   .3* 106.6* 105.9*   MCH 37.0* 37.1* 36.4*   MCHC 34.8 34.9 34.4   RDW 51.7* 52.2* 49.0   PLATELETCT 169 173 162*   MPV  10.0 10.5 10.0     Recent Labs     08/03/20  2021 08/04/20  0906 08/05/20  0239   SODIUM 138 133* 133*   POTASSIUM 4.0 3.9 3.9   CHLORIDE 98 99 99   CO2 26 25 24   GLUCOSE 102* 120* 95   BUN 9 10 11   CREATININE 0.62 0.68 0.59   CALCIUM 8.7 8.5 8.0*             Recent Labs     08/05/20  0239   TRIGLYCERIDE 77   HDL 52   LDL 41       Imaging  MR-BRAIN-WITH & W/O   Final Result      1.  No acute abnormality.   2.  Moderate cerebral atrophy.   3.  Moderate chronic microvascular ischemic disease.   4.  There is no evidence of vasogenic edema. There is focal encephalomalacia in the right frontal lobe likely secondary to the previous brain insult.      EC-ECHOCARDIOGRAM COMPLETE W/O CONT   Final Result      CT-CHEST (THORAX) WITH   Final Result         1.  Bilateral peripheral reticular pulmonary opacities, appearance suggests component of fibrosis.   2.  Bilateral dependent atelectasis is seen.   3.  Left clavicular fracture, appears likely subacute.   4.  Atherosclerosis and atherosclerotic coronary artery disease      Fleischner Society pulmonary nodule recommendations:         CT-HEAD W/O   Final Result         1.  Subtle area of low-density in the right parieto-occipital subcortical white matter, could represent vasogenic edema. Recommend further characterization with contrast-enhanced CT head or MRI to exclude intracranial mass lesion.   2.  Nonspecific white matter changes, commonly associated with small vessel ischemic disease.  Associated mild cerebral atrophy is noted.   3.  Atherosclerosis.      DX-SHOULDER 2+ LEFT   Final Result         1.  No acute traumatic bony injury.   2.  Degenerative changes of the acromioclavicular and glenohumeral joints.              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  Acetaminophen, Toradol as needed, follow-up PT, OT and Ortho consult    Clavicular fracture  Assessment &  Plan  Not acute sub-acute clavicular fracture on CT chest    --- discussed with Ortho     Dementia with behavioral disturbance (HCC)  Assessment & Plan  depakote 125 mg tid prn along with seroquel 25 mg po bid     Vasogenic edema (HCC)  Assessment & Plan  Suspected vasogenic edema, obtain mri brain    - neuro-check q 4 hs     Hyponatremia  Assessment & Plan  LIkely hypovolumic hyponatremia     Macrocytic anemia  Assessment & Plan  Likely 2/2 vitamin b12 deficiency    -- will provide  IM vit B12 followed by po     Vitamin B12 deficiency  Assessment & Plan  Will provide 1000mcg of vitamin b12   IM provided by po    Fall  Assessment & Plan   Pt and OT pending        VTE prophylaxis: scd

## 2020-08-05 NOTE — PROGRESS NOTES
Patient stable vitals, adequate I/os, axox3, wife on onco unit, provided patient with wife room and phone number to contact, patient needs frequent redirection, good appetite, worked with PT, uses cane and one assist, frequently removes cast, MRI completed late in day, neurochecks initiated, seizure precautions to be set up by pm nurse, patient fall risk, bed alarm and tab alarm on.    0800: Dr. Carolina contaced to report abnormal EKG upon admission in ED and elevated Trops.  Dr. Carolina ordered repeat trops, EKG, Echo, and several labs. Results of tests communicated to Dr. Carolina via telephone, no new orders placed.

## 2020-08-05 NOTE — CARE PLAN
Problem: Communication  Goal: The ability to communicate needs accurately and effectively will improve  Outcome: PROGRESSING AS EXPECTED  Note: Encouraged pt to express feelings and ask questions. Pt A&Ox1 to self this morning but now A&Ox2 to self and place. Pt is q4hr neuro check.      Problem: Safety  Goal: Will remain free from injury  Outcome: PROGRESSING AS EXPECTED  Note: Fall precautions in place. Bed in lowest position. Non-skid socks in place. Personal possessions within reach. Mobility sign on door. Bed-alarm on. Call light within reach. Pt educated regarding fall prevention and states understanding. Pt currently in restraints with active order d/t medical treatment interference. Explained pt reason for restraints and required conditions in ordered to remove restraints.      Problem: Knowledge Deficit  Goal: Knowledge of disease process/condition, treatment plan, diagnostic tests, and medications will improve  Outcome: PROGRESSING AS EXPECTED  Note: Pt educated regarding plan of care and medications. All questions answered.      Problem: Pain Management  Goal: Pain level will decrease to patient's comfort goal  Outcome: PROGRESSING AS EXPECTED  Note: Pt assessed for pain regularly and medicated PRN per MAR.      Problem: Safety - Medical Restraint  Goal: Remains free of injury from restraints (Restraint for Interference with Medical Device)  Description: INTERVENTIONS:  1. Determine that other, less restrictive measures have been tried or would not be effective before applying the restraint  2. Evaluate the patient's condition at the time of restraint application  3. Inform patient/family regarding the reason for restraint  4. Q2H: Monitor safety, psychosocial status, comfort, nutrition and hydration  Outcome: PROGRESSING AS EXPECTED  Flowsheets (Taken 8/5/2020 8957)  Addressed this shift: Remains free of injury from restraints (restraint for interference with medical device):   Determine that other,  less restrictive measures have been tried or would not be effective before applying the restraint   Evaluate the patient's condition at the time of restraint application   Inform patient/family regarding the reason for restraint   Every 2 hours: Monitor safety, psychosocial status, comfort, nutrition and hydration

## 2020-08-05 NOTE — ASSESSMENT & PLAN NOTE
Suspected vasogenic edema in CT, MRI with focal encephalomalacia in the right frontal lobe and noted no edema noted

## 2020-08-05 NOTE — ASSESSMENT & PLAN NOTE
Unable to care for himself.   - psych consulted and recs depakote 250 mg tid along with seroquel  - Depakote level was therapeutic on 8/11  - psychiatry following, appreciate recs  - titrate medication as need for agitation and impulsivity

## 2020-08-06 NOTE — PSYCHIATRY
"This encounter was conducted via Zoom .   Verbal consent was obtained. Patient's identity was verified.      PSYCHIATRIC INTAKE EVALUATION    *Reason for admission: ADM on 8/5/20 after ground level fall.                    *Reason for consult:\"dementia with agitation\"         *Requesting Physician/APN: Sagrario Carolina M.D.               Legal Hold status:   n/a         *Chief Complaint: unable to obtain      *HPI (includes Psychiatric ROS):   Pt was found sleeping. He was able to be aroused momentarily, and agreed with teleconsult eval, however due to sedation, he was not able to fully participate. Pt was oriented to name, age, and location only, but not to time or situation. He does not know why he is in restraints. Pt does not know what he is being treated for at this time, neither why he was brought to the hospital in the first place. Denied any AVH, no SI/HI. Unable to obtain further history due to pt's sedation.     As per CNA, pt was on 2 point restrains. Had been sleeping on and off this morning. No agitation noted. He has been confused.       *Medical Review Of Symptoms (not dx conditions):   ROS  Unable to obtain      *Psychiatric Examination:   Vitals:   Vitals:    08/06/20 0720   BP: 131/62   Pulse: 76   Resp: 16   Temp: 35.9 °C (96.7 °F)   SpO2: 97%     Appearance: appears stated age, on 2 point restrains, lethargic, sedated, calm, but unable to fully cooperate due to lethargy/sedation, poor eye contact  Abnormal movements: none  Gait/posture: normal lying in bed  Speech: soft  Though process: linear  Associations: no loose associations  Thought content: denies AVH, no delusions or paranoia elicited, does not appear to be responding to internal stimuli, neither is internally preoccupied.   Judgement and Insight: fair/fair  Orientation:oriented to person, and place only  Recent and Remote Memory: impaired  Attention Span and Concentration: impaired  Language: fluid   Fund of Knowledge: not tested   Mood " and Affect:unable to assess, sedated   SI/HI:denies any active or passive SI/HI    MMSE score and/or clock drawing:not done       *PAST MEDICAL/PSYCH/FAMILY/SOCIAL(as reported by patient):       *medical hx:        TBI: per BRAIN MRI: encephalomalacia in the right frontal lobe likely secondary to the previous brain insult  SZ:denies, none per chart  Stroke: denies, none per chart   Past Medical History:   Diagnosis Date   • Hyperlipidemia    • Hypertension      History reviewed. No pertinent surgical history.     *psychiatric hx:   denies    *family Psych hx:  Unknown      *social hx:unable to obtain; per chart has a DPOA Kisha. Pt is , his wife is currently in the hospital for dementia.  Alcohol: denies  Drugs:denies     *MEDICAL HX: labs, MARS, medications, etc were reviewed. Only those findings of potential interest to psychiatry are noted below:    *Current Medical issues:   see below     *Allergies:  Allergies   Allergen Reactions   • Demerol Anxiety     *Current Medications:    Current Facility-Administered Medications:   •  divalproex (DEPAKOTE SPRINKLE) capsule 125 mg, 125 mg, Oral, Q8HRS, Sagrario Carolina M.D., 125 mg at 08/06/20 0553  •  QUEtiapine (SEROQUEL) tablet 25 mg, 25 mg, Oral, BID, Sagrario Carolina M.D., 25 mg at 08/06/20 0553  •  lidocaine (LIDODERM) 5 % 1 Patch, 1 Patch, Transdermal, Q24HR, Sagrario Carolina M.D., 1 Patch at 08/05/20 0845  •  morphine (pf) 4 MG/ML injection 2 mg, 2 mg, Intravenous, Q4HRS PRN, Sagrario Carolina M.D.  •  cyanocobalamin (VITAMIN B-12) injection 1,000 mcg, 1,000 mcg, Intramuscular, Q30 DAYS, Sagrario Carolina M.D., 1,000 mcg at 08/04/20 1106  •  NS infusion, , Intravenous, Continuous, Sagrario Carolina M.D., Last Rate: 83 mL/hr at 08/06/20 0553  •  metoprolol SR (TOPROL XL) tablet 25 mg, 25 mg, Oral, Q DAY, Burt Rosas M.D., 25 mg at 08/06/20 0553  •  omeprazole (PRILOSEC) capsule 20 mg, 20 mg, Oral, DAILY, Burt Rosas M.D., 20 mg at 08/06/20 0553  •  senna-docusate  (PERICOLACE or SENOKOT S) 8.6-50 MG per tablet 2 Tab, 2 Tab, Oral, BID, 2 Tab at 08/06/20 0553 **AND** polyethylene glycol/lytes (MIRALAX) PACKET 1 Packet, 1 Packet, Oral, QDAY PRN **AND** magnesium hydroxide (MILK OF MAGNESIA) suspension 30 mL, 30 mL, Oral, QDAY PRN **AND** bisacodyl (DULCOLAX) suppository 10 mg, 10 mg, Rectal, QDAY PRN, Burt Rosas M.D.  •  acetaminophen (TYLENOL) tablet 650 mg, 650 mg, Oral, Q6HRS PRN, Burt Rosas M.D., 650 mg at 08/05/20 0830  •  enalaprilat (VASOTEC) injection 1.25 mg, 1.25 mg, Intravenous, Q6HRS PRN, Burt Rosas M.D.  *ECG: personally reviewed QTc 469   *Imaging: personally reviewed head CT and brain MRI  MR-BRAIN-WITH & W/O   Final Result      1.  No acute abnormality.   2.  Moderate cerebral atrophy.   3.  Moderate chronic microvascular ischemic disease.   4.  There is no evidence of vasogenic edema. There is focal encephalomalacia in the right frontal lobe likely secondary to the previous brain insult.      EC-ECHOCARDIOGRAM COMPLETE W/O CONT   Final Result      CT-CHEST (THORAX) WITH   Final Result         1.  Bilateral peripheral reticular pulmonary opacities, appearance suggests component of fibrosis.   2.  Bilateral dependent atelectasis is seen.   3.  Left clavicular fracture, appears likely subacute.   4.  Atherosclerosis and atherosclerotic coronary artery disease      Fleischner Society pulmonary nodule recommendations:         CT-HEAD W/O   Final Result         1.  Subtle area of low-density in the right parieto-occipital subcortical white matter, could represent vasogenic edema. Recommend further characterization with contrast-enhanced CT head or MRI to exclude intracranial mass lesion.   2.  Nonspecific white matter changes, commonly associated with small vessel ischemic disease.  Associated mild cerebral atrophy is noted.   3.  Atherosclerosis.      DX-SHOULDER 2+ LEFT   Final Result         1.  No acute traumatic bony injury.   2.  Degenerative  changes of the acromioclavicular and glenohumeral joints.              EEG:  Not done     *Labs:  Recent Results (from the past 72 hour(s))   CBC WITH DIFFERENTIAL    Collection Time: 08/03/20  8:21 PM   Result Value Ref Range    WBC 8.4 4.8 - 10.8 K/uL    RBC 4.16 (L) 4.70 - 6.10 M/uL    Hemoglobin 15.4 14.0 - 18.0 g/dL    Hematocrit 44.2 42.0 - 52.0 %    .3 (H) 81.4 - 97.8 fL    MCH 37.0 (H) 27.0 - 33.0 pg    MCHC 34.8 33.7 - 35.3 g/dL    RDW 51.7 (H) 35.9 - 50.0 fL    Platelet Count 169 164 - 446 K/uL    MPV 10.0 9.0 - 12.9 fL    Neutrophils-Polys 66.60 44.00 - 72.00 %    Lymphocytes 15.00 (L) 22.00 - 41.00 %    Monocytes 15.40 (H) 0.00 - 13.40 %    Eosinophils 2.20 0.00 - 6.90 %    Basophils 0.40 0.00 - 1.80 %    Immature Granulocytes 0.40 0.00 - 0.90 %    Nucleated RBC 0.00 /100 WBC    Neutrophils (Absolute) 5.57 1.82 - 7.42 K/uL    Lymphs (Absolute) 1.25 1.00 - 4.80 K/uL    Monos (Absolute) 1.29 (H) 0.00 - 0.85 K/uL    Eos (Absolute) 0.18 0.00 - 0.51 K/uL    Baso (Absolute) 0.03 0.00 - 0.12 K/uL    Immature Granulocytes (abs) 0.03 0.00 - 0.11 K/uL    NRBC (Absolute) 0.00 K/uL   Basic Metabolic Panel    Collection Time: 08/03/20  8:21 PM   Result Value Ref Range    Sodium 138 135 - 145 mmol/L    Potassium 4.0 3.6 - 5.5 mmol/L    Chloride 98 96 - 112 mmol/L    Co2 26 20 - 33 mmol/L    Glucose 102 (H) 65 - 99 mg/dL    Bun 9 8 - 22 mg/dL    Creatinine 0.62 0.50 - 1.40 mg/dL    Calcium 8.7 8.5 - 10.5 mg/dL    Anion Gap 14.0 7.0 - 16.0   ESTIMATED GFR    Collection Time: 08/03/20  8:21 PM   Result Value Ref Range    GFR If African American >60 >60 mL/min/1.73 m 2    GFR If Non African American >60 >60 mL/min/1.73 m 2   TROPONIN    Collection Time: 08/03/20  8:21 PM   Result Value Ref Range    Troponin T 38 (H) 6 - 19 ng/L   VITAMIN B12    Collection Time: 08/03/20  8:21 PM   Result Value Ref Range    Vitamin B12 -True Cobalamin 365 211 - 911 pg/mL   FOLATE    Collection Time: 08/03/20  8:21 PM   Result Value  Ref Range    Folate -Folic Acid 5.3 >4.0 ng/mL   EKG    Collection Time: 20 11:12 PM   Result Value Ref Range    Report       [facility]    Test Date:  2020  Pt Name:    UMESH SANCHEZ                 Department: ER  MRN:        [id number]                      Room:       [facility]  Gender:     Male                         Technician: [id number]  :        [id number]                   Requested By:BRIGHT STOVER  Order #:    [id number]                    Reading MD: Bright tSover MD    Measurements  Intervals                                Axis  Rate:       98                           P:          41  OH:         172                          QRS:        6  QRSD:       80                           T:          46  QT:         352  QTc:        450    Interpretive Statements  SINUS RHYTHM  ATRIAL PREMATURE COMPLEX  CONSIDER ANTERIOR INFARCT  BASELINE WANDER IN LEAD(S) II,III,aVF,V5  No previous ECG available for comparison  Electronically Signed On 2020 4:15:32 PDT by Bright Stover MD     Routine (COVID/SARS COV-2 In-House PCR up to 24 hours)    Collection Time: 20 11:58 PM    Specimen: Nasopharyngeal; Respirate   Result Value Ref Range    COVID Order Status Received    SARS-CoV-2, PCR (In-House)    Collection Time: 20 11:58 PM   Result Value Ref Range    SARS-CoV-2 Source NP Swab     SARS-CoV-2 by PCR NotDetected    TSH    Collection Time: 20  9:06 AM   Result Value Ref Range    TSH 7.990 (H) 0.380 - 5.330 uIU/mL   FREE THYROXINE    Collection Time: 20  9:06 AM   Result Value Ref Range    Free T-4 0.96 0.93 - 1.70 ng/dL   FERRITIN    Collection Time: 20  9:06 AM   Result Value Ref Range    Ferritin 433.0 (H) 22.0 - 322.0 ng/mL   CBC WITH DIFFERENTIAL    Collection Time: 20  9:06 AM   Result Value Ref Range    WBC 7.6 4.8 - 10.8 K/uL    RBC 4.12 (L) 4.70 - 6.10 M/uL    Hemoglobin 15.3 14.0 - 18.0 g/dL    Hematocrit 43.9 42.0 - 52.0 %    .6  (H) 81.4 - 97.8 fL    MCH 37.1 (H) 27.0 - 33.0 pg    MCHC 34.9 33.7 - 35.3 g/dL    RDW 52.2 (H) 35.9 - 50.0 fL    Platelet Count 173 164 - 446 K/uL    MPV 10.5 9.0 - 12.9 fL    Neutrophils-Polys 60.00 44.00 - 72.00 %    Lymphocytes 17.90 (L) 22.00 - 41.00 %    Monocytes 18.90 (H) 0.00 - 13.40 %    Eosinophils 2.40 0.00 - 6.90 %    Basophils 0.40 0.00 - 1.80 %    Immature Granulocytes 0.40 0.00 - 0.90 %    Nucleated RBC 0.00 /100 WBC    Neutrophils (Absolute) 4.57 1.82 - 7.42 K/uL    Lymphs (Absolute) 1.36 1.00 - 4.80 K/uL    Monos (Absolute) 1.44 (H) 0.00 - 0.85 K/uL    Eos (Absolute) 0.18 0.00 - 0.51 K/uL    Baso (Absolute) 0.03 0.00 - 0.12 K/uL    Immature Granulocytes (abs) 0.03 0.00 - 0.11 K/uL    NRBC (Absolute) 0.00 K/uL   Comp Metabolic Panel    Collection Time: 08/04/20  9:06 AM   Result Value Ref Range    Sodium 133 (L) 135 - 145 mmol/L    Potassium 3.9 3.6 - 5.5 mmol/L    Chloride 99 96 - 112 mmol/L    Co2 25 20 - 33 mmol/L    Anion Gap 9.0 7.0 - 16.0    Glucose 120 (H) 65 - 99 mg/dL    Bun 10 8 - 22 mg/dL    Creatinine 0.68 0.50 - 1.40 mg/dL    Calcium 8.5 8.5 - 10.5 mg/dL    AST(SGOT) 14 12 - 45 U/L    ALT(SGPT) 11 2 - 50 U/L    Alkaline Phosphatase 75 30 - 99 U/L    Total Bilirubin 0.6 0.1 - 1.5 mg/dL    Albumin 2.8 (L) 3.2 - 4.9 g/dL    Total Protein 5.9 (L) 6.0 - 8.2 g/dL    Globulin 3.1 1.9 - 3.5 g/dL    A-G Ratio 0.9 g/dL   TROPONIN    Collection Time: 08/04/20  9:06 AM   Result Value Ref Range    Troponin T 27 (H) 6 - 19 ng/L   proBrain Natriuretic Peptide, NT    Collection Time: 08/04/20  9:06 AM   Result Value Ref Range    NT-proBNP 1077 (H) 0 - 125 pg/mL   CREATINE KINASE    Collection Time: 08/04/20  9:06 AM   Result Value Ref Range    CPK Total 20 0 - 154 U/L   IRON/TOTAL IRON BIND    Collection Time: 08/04/20  9:06 AM   Result Value Ref Range    Iron 39 (L) 50 - 180 ug/dL    Total Iron Binding 143 (L) 250 - 450 ug/dL    Unsat Iron Binding 104 (L) 110 - 370 ug/dL    % Saturation 27 15 - 55 %    ESTIMATED GFR    Collection Time: 20  9:06 AM   Result Value Ref Range    GFR If African American >60 >60 mL/min/1.73 m 2    GFR If Non African American >60 >60 mL/min/1.73 m 2   EKG    Collection Time: 20  9:19 AM   Result Value Ref Range    Report       [facility]    Test Date:  2020  Pt Name:    UMESH SANCHEZ                 Department: 61  MRN:        [id number]                      Room:       [facility]  Gender:     Male                         Technician: [facility]  :        [id number]                   Requested By:JOJO GARCIA  Order #:    [id number]                    Reading MD: Quinten Medina MD    Measurements  Intervals                                Axis  Rate:       72                           P:          16  VA:         162                          QRS:        -12  QRSD:       84                           T:          16  QT:         428  QTc:        469    Interpretive Statements  SINUS RHYTHM  DELAYED PRECORDIAL R WAVE PROGRESSION  Electronically Signed On 2020 11:55:16 PDT by Quinten Medina MD     EC-ECHOCARDIOGRAM COMPLETE W/O CONT    Collection Time: 20  5:42 PM   Result Value Ref Range    Eject.Frac. MOD BP 70.69     Eject.Frac. MOD 4C 73.14     Eject.Frac. MOD 2C 70.53     Left Ventrical Ejection Fraction 70    TROPONIN    Collection Time: 20  5:46 PM   Result Value Ref Range    Troponin T 22 (H) 6 - 19 ng/L   BLOOD CULTURE    Collection Time: 20  5:46 PM    Specimen: Peripheral; Blood   Result Value Ref Range    Significant Indicator NEG     Source BLD     Site PERIPHERAL     Culture Result       No Growth  Note: Blood cultures are incubated for 5 days and  are monitored continuously.Positive blood cultures  are called to the RN and reported as soon as  they are identified.     BLOOD CULTURE    Collection Time: 20  5:46 PM    Specimen: Peripheral; Blood   Result Value Ref Range    Significant Indicator NEG     Source BLD     Site PERIPHERAL      Culture Result       No Growth  Note: Blood cultures are incubated for 5 days and  are monitored continuously.Positive blood cultures  are called to the RN and reported as soon as  they are identified.     PROCALCITONIN    Collection Time: 08/04/20  5:46 PM   Result Value Ref Range    Procalcitonin 0.06 <0.25 ng/mL   Lipid Profile    Collection Time: 08/05/20  2:39 AM   Result Value Ref Range    Cholesterol,Tot 108 100 - 199 mg/dL    Triglycerides 77 0 - 149 mg/dL    HDL 52 >=40 mg/dL    LDL 41 <100 mg/dL   CBC WITH DIFFERENTIAL    Collection Time: 08/05/20  2:39 AM   Result Value Ref Range    WBC 5.5 4.8 - 10.8 K/uL    RBC 3.76 (L) 4.70 - 6.10 M/uL    Hemoglobin 13.7 (L) 14.0 - 18.0 g/dL    Hematocrit 39.8 (L) 42.0 - 52.0 %    .9 (H) 81.4 - 97.8 fL    MCH 36.4 (H) 27.0 - 33.0 pg    MCHC 34.4 33.7 - 35.3 g/dL    RDW 49.0 35.9 - 50.0 fL    Platelet Count 162 (L) 164 - 446 K/uL    MPV 10.0 9.0 - 12.9 fL    Neutrophils-Polys 57.90 44.00 - 72.00 %    Lymphocytes 20.20 (L) 22.00 - 41.00 %    Monocytes 16.70 (H) 0.00 - 13.40 %    Eosinophils 4.40 0.00 - 6.90 %    Basophils 0.40 0.00 - 1.80 %    Immature Granulocytes 0.40 0.00 - 0.90 %    Nucleated RBC 0.00 /100 WBC    Neutrophils (Absolute) 3.19 1.82 - 7.42 K/uL    Lymphs (Absolute) 1.11 1.00 - 4.80 K/uL    Monos (Absolute) 0.92 (H) 0.00 - 0.85 K/uL    Eos (Absolute) 0.24 0.00 - 0.51 K/uL    Baso (Absolute) 0.02 0.00 - 0.12 K/uL    Immature Granulocytes (abs) 0.02 0.00 - 0.11 K/uL    NRBC (Absolute) 0.00 K/uL   Renal Function Panel    Collection Time: 08/05/20  2:39 AM   Result Value Ref Range    Sodium 133 (L) 135 - 145 mmol/L    Potassium 3.9 3.6 - 5.5 mmol/L    Chloride 99 96 - 112 mmol/L    Co2 24 20 - 33 mmol/L    Glucose 95 65 - 99 mg/dL    Creatinine 0.59 0.50 - 1.40 mg/dL    Bun 11 8 - 22 mg/dL    Calcium 8.0 (L) 8.5 - 10.5 mg/dL    Phosphorus 3.1 2.5 - 4.5 mg/dL    Albumin 2.5 (L) 3.2 - 4.9 g/dL   MAGNESIUM    Collection Time: 08/05/20  2:39 AM   Result  Value Ref Range    Magnesium 1.7 1.5 - 2.5 mg/dL   ESTIMATED GFR    Collection Time: 08/05/20  2:39 AM   Result Value Ref Range    GFR If African American >60 >60 mL/min/1.73 m 2    GFR If Non African American >60 >60 mL/min/1.73 m 2   TROPONIN    Collection Time: 08/05/20  2:39 AM   Result Value Ref Range    Troponin T 22 (H) 6 - 19 ng/L   CBC WITH DIFFERENTIAL    Collection Time: 08/06/20  3:34 AM   Result Value Ref Range    WBC 6.7 4.8 - 10.8 K/uL    RBC 3.84 (L) 4.70 - 6.10 M/uL    Hemoglobin 14.2 14.0 - 18.0 g/dL    Hematocrit 40.4 (L) 42.0 - 52.0 %    .2 (H) 81.4 - 97.8 fL    MCH 37.0 (H) 27.0 - 33.0 pg    MCHC 35.1 33.7 - 35.3 g/dL    RDW 48.5 35.9 - 50.0 fL    Platelet Count 183 164 - 446 K/uL    MPV 9.8 9.0 - 12.9 fL    Neutrophils-Polys 59.60 44.00 - 72.00 %    Lymphocytes 18.70 (L) 22.00 - 41.00 %    Monocytes 18.00 (H) 0.00 - 13.40 %    Eosinophils 3.00 0.00 - 6.90 %    Basophils 0.40 0.00 - 1.80 %    Immature Granulocytes 0.30 0.00 - 0.90 %    Nucleated RBC 0.00 /100 WBC    Neutrophils (Absolute) 3.97 1.82 - 7.42 K/uL    Lymphs (Absolute) 1.25 1.00 - 4.80 K/uL    Monos (Absolute) 1.20 (H) 0.00 - 0.85 K/uL    Eos (Absolute) 0.20 0.00 - 0.51 K/uL    Baso (Absolute) 0.03 0.00 - 0.12 K/uL    Immature Granulocytes (abs) 0.02 0.00 - 0.11 K/uL    NRBC (Absolute) 0.00 K/uL   Renal Function Panel    Collection Time: 08/06/20  3:34 AM   Result Value Ref Range    Sodium 138 135 - 145 mmol/L    Potassium 3.3 (L) 3.6 - 5.5 mmol/L    Chloride 105 96 - 112 mmol/L    Co2 23 20 - 33 mmol/L    Glucose 84 65 - 99 mg/dL    Creatinine 0.69 0.50 - 1.40 mg/dL    Bun 7 (L) 8 - 22 mg/dL    Calcium 8.0 (L) 8.5 - 10.5 mg/dL    Phosphorus 2.0 (L) 2.5 - 4.5 mg/dL    Albumin 2.7 (L) 3.2 - 4.9 g/dL   MAGNESIUM    Collection Time: 08/06/20  3:34 AM   Result Value Ref Range    Magnesium 1.8 1.5 - 2.5 mg/dL   ESTIMATED GFR    Collection Time: 08/06/20  3:34 AM   Result Value Ref Range    GFR If  >60 >60  mL/min/1.73 m 2    GFR If Non African American >60 >60 mL/min/1.73 m 2   TROPONIN    Collection Time: 08/06/20  3:34 AM   Result Value Ref Range    Troponin T 22 (H) 6 - 19 ng/L       Recent Labs     08/04/20  0906 08/05/20  0239 08/06/20  0334   WBC 7.6 5.5 6.7   RBC 4.12* 3.76* 3.84*   HEMOGLOBIN 15.3 13.7* 14.2   HEMATOCRIT 43.9 39.8* 40.4*   .6* 105.9* 105.2*   MCH 37.1* 36.4* 37.0*   RDW 52.2* 49.0 48.5   PLATELETCT 173 162* 183   MPV 10.5 10.0 9.8   NEUTSPOLYS 60.00 57.90 59.60   LYMPHOCYTES 17.90* 20.20* 18.70*   MONOCYTES 18.90* 16.70* 18.00*   EOSINOPHILS 2.40 4.40 3.00   BASOPHILS 0.40 0.40 0.40     Lab Results   Component Value Date/Time    SODIUM 138 08/06/2020 03:34 AM    POTASSIUM 3.3 (L) 08/06/2020 03:34 AM    CHLORIDE 105 08/06/2020 03:34 AM    CO2 23 08/06/2020 03:34 AM    GLUCOSE 84 08/06/2020 03:34 AM    BUN 7 (L) 08/06/2020 03:34 AM    CREATININE 0.69 08/06/2020 03:34 AM    CREATININE 1.10 11/30/2012 12:00 AM    BUNCREATRAT 13 11/30/2012 12:00 AM         No results found for: BREATHALIZER  No components found for: BLOODALCOHOL   No results found for: AMPHUR, BARBSURINE, BENZODIAZU, COCAINEMET, METHADONE, ECSTASY, OPIATES, OXYCODN, PCPURINE, PROPOXY, CANNABINOID  Lab Results   Component Value Date/Time    FREET4 0.96 08/04/2020 0906        Assessment: Pt's baseline is unknown to this provider. He carries a diagnosis of dementia, but it is unclear/unknown to me if patient was experiencing behavioral disturbances prior to admission. Due to confusion, episodes of agitation, decrease awareness, lethargy, drowsiness and distractibility, pt appears to be deliriuous at this time.       Dx:    Delirium with behavioral disturbances.   Dementia    Medical (specify new and established dx):  Intractable pain  Clavicular fracture  encephalomacia  Dementia with behavioral disturbances  Vasogenic edema  Hyponatremia  Macrocytic anemia  Vit B 12 def  Fall      Plan:  1- Legal hold:n/a  2- Psychotropic  medications:  Increase depakote to 250mg Po TID for agitation. Obtain level in 5 days.   Continue seroquel 25mg Po BID for agitation (started on 8/5). Increase 25mg every 2-3 days as tolerated. Monitor for SE such as EPS and orthostatic hypotension which can increase risk for falls.    3- PRNS for agitation: Geodon 10mg IM Q6H PRN, max dose daily is 40mg  Avoid Haldol with this patient due to brain insult.  Avoid anticholinergics, histaminics and benzos.   4- Psychiatry will follow up.     Thank you for the consult.

## 2020-08-06 NOTE — CONSULTS
Reason for PC Consult: Advance Care Planning    Consulted by: Dr Carolina    Assessment:  General: 87 yo male admitted on 8/3/2020 for Intractable pain  PMH: Hyperlipidemia, HTN    Pt presented to the ED due to the severe shoulder pain after a GLF approximately one week ago. He states that the pain has been increasing and denies having hit his head. Chest CT shows left clavicular fx-likely subacute, Ct of head showed area of low-density in right parieto-occipital subcortical white matter with recommendations for possible contrast-enhanced Ct or MRI for greater visualization.     Dyspnea: No- resp rate 16, 97% on room air.   Last BM: 08/05/20-    Pain: Unable to determine-    Depression: Unable to determine-    Dementia: Yes;  6, D    Spiritual:  Is Restoration or spirituality important for coping with this illness? Yes-    Has a  or spiritual provider visit been requested? No    Palliative Performance Scale: 50%    Advance Directive: None on file.    DPOA: No, Kisha Walker [phone], family friend. Kisha stated that she is the pts DPOA and will provide that paperwork.   POLST: None on file      Code Status: Full-      Outcome:  Received consult, per notes pt now in restraints and A&OX1. Arrived at the room, pt remains in restraints, currently sleeping.     Called pts friend, Kisha Denise, introduced self and the role of Palliative Care. Kisha stated that she is the pts DPOA, let her know that we currently do not have that paperwork. Kisha stated that she could get that to us.    Kisha was able to provide an overview of the pt and his wife. At this time the pts wife, Sumi, is also in the hospital and she also has dementia. Kisha stated that she is actively attempting to organize an Assisted Living sort of support for both the pt, Burt, and his wife Sumi, so that they can be safe and stay together. Kisha stated that she is currently working on the financial aspects of this but that she and  "her  looked at facilities and were unable to tell them of there couples \"level of needs\" and she asked to have some guidance for that. Let her know I will update the Unit CM RN/SW team and that I can also update the CM Team for the pts wife in order to coordinate the DC plan for them both.      Spoke with Kisha about the pts current medical condition. Let her know that once we have received that DPOA paperwork we can work with her to make medical decisions for the pt which will be very helpful.     Discussed the pts code status, explained Full code, CPR, Intubation vs DNR and DNI. Kisha stated that she is sure he would want to be a DNR/DNI due to his decreasing quality of life. She stated that his wife is currently a DNR.   Let her know that we can work on getting that AD on file and then work with her for medical decisions.   Kisha was very happy to know that we will be able to attempt to help her coordinate the discharge of her friends. Provided her with the Palliative Team contact number and let her know we will work together on the pts POC/GOC.       Updated: Dr Carolina, Unit CM RN    Plan: TBD as pts admission progresses    Thank you for allowing Palliative Care to participate in this patient's care. Please feel free to call [phone] with any questions or concerns.  "

## 2020-08-06 NOTE — PROGRESS NOTES
"Pt A&Ox1 to self only this morning. Very agitated this morning; was yelling out \"get out\" to staff and calling staff \"stupid\". Pt has bilateral soft wrist restraints in place with active order. Pt was reeducated on reason for restraints and required conditions in order to remove restraints. Pt is q4hr neuro check. Assesments complete. POC discussed with pt; all questions answered at this time. Fall precautions in place. Mobility sign on door. Bed-alarm on. Call light within reach. Pt educated regarding fall prevention. Hourly rounding in place along with restraint checks.   "

## 2020-08-06 NOTE — CARE PLAN
Problem: Communication  Goal: The ability to communicate needs accurately and effectively will improve  Outcome: PROGRESSING AS EXPECTED  Note: Encouraged pt to express feelings and ask questions. Pt A&Ox1-3.      Problem: Knowledge Deficit  Goal: Knowledge of disease process/condition, treatment plan, diagnostic tests, and medications will improve  Outcome: PROGRESSING AS EXPECTED  Note: Pt educated regarding plan of care and medications. All questions answered.      Problem: Safety - Medical Restraint  Goal: Remains free of injury from restraints (Restraint for Interference with Medical Device)   Outcome: PROGRESSING AS EXPECTED  Description: INTERVENTIONS:  1. Determine that other, less restrictive measures have been tried or would not be effective before applying the restraint  2. Evaluate the patient's condition at the time of restraint application  3. Inform patient/family regarding the reason for restraint  4. Q2H: Monitor safety, psychosocial status, comfort, nutrition and hydration  Flowsheets (Taken 8/6/2020 3848)  Addressed this shift: Remains free of injury from restraints (restraint for interference with medical device):   Determine that other, less restrictive measures have been tried or would not be effective before applying the restraint   Evaluate the patient's condition at the time of restraint application   Inform patient/family regarding the reason for restraint   Every 2 hours: Monitor safety, psychosocial status, comfort, nutrition and hydration  Note: Restraints in place with active order.

## 2020-08-06 NOTE — PROGRESS NOTES
A&O to self only. VSS on RA. No complaints of pain. BUE soft wrist restraints. Call light in reach, bed locked/lowest position/alarm set. Health system.

## 2020-08-07 NOTE — PROGRESS NOTES
At shift changed patient is very confused,,agitated, restless,on bilateral wrist restrains ,tried to get out from bed multiple times refused eduacation and unable to follow commands,applied another right leg restraint with security helping staff,refused scheduled med,new iv access started for his ivf,patient is calm right now.

## 2020-08-07 NOTE — PROGRESS NOTES
Elayne GRIFFITHS from oncology called this RN in hopes to reconnect pt and wife (who is currently hospitalized on oncology floor) through phone. Pt currently asleep. This RN will help pt call wife when he is awake and calm.

## 2020-08-07 NOTE — PROGRESS NOTES
[facility] Medicine Daily Progress Note    Date of Service  8/7/2020    Chief Complaint  88 y.o. male admitted 8/3/2020 with   Chief Complaint   Patient presents with   • GLF     pt had GLF 1 week ago unknown LOC; c/o L sided chest wall pain and L arm and shoulder pain; blood glucose 170 per EMS         Hospital Course  This is a 88-year-old male with past medical history significant for hyperlipidemia, hypertension presented to ER on 8/4/2020 after he had a ground-level fall.  It is noted that patient had a ground-level fall 1 week ago, complaint of left-sided chest wall pain, left arm and shoulder pain.  He reports having said a ground-level fall, denied any dizziness.     Upon presentation in ER, he is noted to have macrocytosis with MCV of 106 , mildly elevated troponin at 38.  CT chest abdomen showed left clavicular fracture appears likely subacute.  ER.  Discussed with orthopedic surgery recommended following up as an outpatient.       CT head showed subtle area of low density in the right parieto-occipital subcortical white matter could represent vasogenic edema, MRI of brain with and without contrast ordered.  Will provide neurochecks every 4 hours, on aspiration, fall, seizure precaution.     Considering macrocytosis vitamin B12 was obtained noted to be low, will provide vitamin B12 thousand MCG p.o. daily; mildly elevated TSH at 7s , will start synthroid 25 mcg po qd.     Elevated troponin at 38, repeat 27.  Echo, EKG did not show any ST and T wave changes.  Obtain orthostatic vitals        Interval Problem Update  No acute events overnight.  Early in the morning patient noted to be agitated, placed on restrain  --- Troponin trending down, echo did not show any wall motion abnormality, EF 70%, moderate aortic stenosis      8/6:  Patient is noted to be agitated, on restrain, psych consulted for assistance  - MRI brain focal encephalomalacia in the right frontal lobe    ---- hypokalemia ta 3.3   --- provide  vitamin b12 IM   --- Ortho  surgery was consulted, WBAT    8/7:  -- Patient continues to remain agitated and has been requiring restrains, psych consulted and following, will continue Depakote 250 tid along with seroquel, psych res to check Depakote level in 5 days   -- appreciate ortho input palliative following     Consultants/Specialty  None     Code Status  Full Code    Disposition  TBD    Review of Systems  Limited 2/2 patient mentation      Physical Exam  Temp:  [36.3 °C (97.3 °F)-36.5 °C (97.7 °F)] 36.3 °C (97.3 °F)  Pulse:  [64-86] 74  Resp:  [16-18] 18  BP: (117-194)/(76-99) 148/76  SpO2:  [92 %-97 %] 96 %    Physical Exam  Vitals signs and nursing note reviewed.   Constitutional:       Appearance: Normal appearance.      Comments:  And awake    HENT:      Head: Normocephalic and atraumatic.   Neck:      Musculoskeletal: Neck supple.   Cardiovascular:      Rate and Rhythm: Normal rate and regular rhythm.   Pulmonary:      Effort: Pulmonary effort is normal.      Breath sounds: Normal breath sounds.      Comments: Decreased breath sound at the bases  Abdominal:      General: Abdomen is flat. Bowel sounds are normal. There is no distension.      Palpations: Abdomen is soft.   Musculoskeletal:         General: No swelling.   Skin:     General: Skin is warm.   Neurological:      Mental Status: He is alert.      Cranial Nerves: No cranial nerve deficit.      Comments: Alert and orinented x 2-3 and does follow commands intermittently   Psychiatric:         Mood and Affect: Mood normal.         Fluids    Intake/Output Summary (Last 24 hours) at 8/7/2020 1209  Last data filed at 8/6/2020 1907  Gross per 24 hour   Intake 318 ml   Output 200 ml   Net 118 ml       Laboratory  Recent Labs     08/05/20  0239 08/06/20  0334   WBC 5.5 6.7   RBC 3.76* 3.84*   HEMOGLOBIN 13.7* 14.2   HEMATOCRIT 39.8* 40.4*   .9* 105.2*   MCH 36.4* 37.0*   MCHC 34.4 35.1   RDW 49.0 48.5   PLATELETCT 162* 183   MPV 10.0 9.8     Recent  Labs     08/05/20  0239 08/06/20  0334   SODIUM 133* 138   POTASSIUM 3.9 3.3*   CHLORIDE 99 105   CO2 24 23   GLUCOSE 95 84   BUN 11 7*   CREATININE 0.59 0.69   CALCIUM 8.0* 8.0*             Recent Labs     08/05/20  0239   TRIGLYCERIDE 77   HDL 52   LDL 41       Imaging  MR-BRAIN-WITH & W/O   Final Result      1.  No acute abnormality.   2.  Moderate cerebral atrophy.   3.  Moderate chronic microvascular ischemic disease.   4.  There is no evidence of vasogenic edema. There is focal encephalomalacia in the right frontal lobe likely secondary to the previous brain insult.      EC-ECHOCARDIOGRAM COMPLETE W/O CONT   Final Result      CT-CHEST (THORAX) WITH   Final Result         1.  Bilateral peripheral reticular pulmonary opacities, appearance suggests component of fibrosis.   2.  Bilateral dependent atelectasis is seen.   3.  Left clavicular fracture, appears likely subacute.   4.  Atherosclerosis and atherosclerotic coronary artery disease      Fleischner Society pulmonary nodule recommendations:         CT-HEAD W/O   Final Result         1.  Subtle area of low-density in the right parieto-occipital subcortical white matter, could represent vasogenic edema. Recommend further characterization with contrast-enhanced CT head or MRI to exclude intracranial mass lesion.   2.  Nonspecific white matter changes, commonly associated with small vessel ischemic disease.  Associated mild cerebral atrophy is noted.   3.  Atherosclerosis.      DX-SHOULDER 2+ LEFT   Final Result         1.  No acute traumatic bony injury.   2.  Degenerative changes of the acromioclavicular and glenohumeral joints.              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   -- PT and OT    -- tylenol and toradol    Clavicular fracture  Assessment & Plan  Not acute sub-acute clavicular fracture on CT chest    --- discussed with Ortho, appreciate their  input    Dementia with behavioral disturbance (HCC)  Assessment & Plan  Psych consulted and recs depakote 250 mg tid  along with seroquel   Check depakote level in 5 days     Vasogenic edema (HCC)  Assessment & Plan  Suspected vasogenic edema, MRI brain focal encephalomalacia in the right frontal lobe    - neuro-check q 4 hs; no focal deficit     Hyponatremia  Assessment & Plan  LIkely hypovolumic hyponatremia     Macrocytic anemia  Assessment & Plan  Likely 2/2 vitamin b12 deficiency    -- will provide  IM vit B12 followed by po     Vitamin B12 deficiency  Assessment & Plan  Will provide 1000mcg of vitamin b12   IM provided by po    Fall  Assessment & Plan   Pt and OT pending        VTE prophylaxis: lovenox

## 2020-08-07 NOTE — PALLIATIVE CARE
Palliative Care follow-up  Spoke with pts friend Kisha, who stated that she had an AD to send.   Printed sent AD, it is the AD for the pts wife.   Called Kisha back, and discussed AD. Estella was confused, this is the only one that she has. She stated that the pts PCP is Dr Byrnes, at [phone]. And that perhaps he would have an AD on file for the pt.     Called Dr Byrnes's office, left message with the Medical Records department there with a request for call back to determine if the pt has an AD on file. Contact number provided.     Updated: Dr Carolina     Plan: Will continue to work with family friend, Kisha, to assist with pt and his wife's coordinated plan who is also admitted at this time.     Thank you for allowing Palliative Care to support this patient and family. Contact [phone] for additional assistance, change in patient status, or with any questions/concerns.

## 2020-08-07 NOTE — PROGRESS NOTES
Patient is been very agitated and restless and confused,removed his gown and tried to get out of bed within restraints,Geodon im given.

## 2020-08-07 NOTE — PROGRESS NOTES
Pt talked to wife over phone today after lunch. Pt has been pleasant and cooperative since morning. Talking to wife has seemed to help pt relax and brought him in a very good mood.

## 2020-08-07 NOTE — PROGRESS NOTES
Pt is A&Ox4 with confusion; room air. Pleasant this morning during med pass and assessment. Noted pt had right ankle restraint with no active order in place but just night shift nurse note stating nurse got verbal order from APRN. Right ankle restraint removed immediately. MD Carolina will be notified of this situation. Pt currently has bilateral soft wrist restraints in place with active order. Pt was reeducated on reason for restraints and required conditions in order to remove restraints. Pt is q4hr neuro check. Assesments complete. POC discussed with pt; all questions answered at this time. Fall precautions in place. Mobility sign on door. Bed-alarm on. Call light within reach. Pt educated regarding fall prevention. Hourly rounding in place along with restraint checks.

## 2020-08-07 NOTE — CARE PLAN
Problem: Nutritional:  Goal: Achieve adequate nutritional intake  Description: Patient will continue to consume >50% of meals  Outcome: PROGRESSING AS EXPECTED   PO intake variable. Majority of meals > 50%, however pt has refused 1 meal daily. RD will continue to follow.

## 2020-08-07 NOTE — CARE PLAN
Problem: Knowledge Deficit  Goal: Knowledge of disease process/condition, treatment plan, diagnostic tests, and medications will improve  Outcome: PROGRESSING AS EXPECTED  Note: Pt educated regarding plan of care and medications. All questions answered.      Problem: Pain Management  Goal: Pain level will decrease to patient's comfort goal  Outcome: PROGRESSING AS EXPECTED  Note: Pt assessed for pain regularly and medicated PRN per MAR.      Problem: Safety - Medical Restraint  Goal: Remains free of injury from restraints (Restraint for Interference with Medical Device)  Description: INTERVENTIONS:  1. Determine that other, less restrictive measures have been tried or would not be effective before applying the restraint  2. Evaluate the patient's condition at the time of restraint application  3. Inform patient/family regarding the reason for restraint  4. Q2H: Monitor safety, psychosocial status, comfort, nutrition and hydration  Outcome: PROGRESSING AS EXPECTED  Flowsheets (Taken 8/7/2020 1009)  Addressed this shift: Remains free of injury from restraints (restraint for interference with medical device):   Determine that other, less restrictive measures have been tried or would not be effective before applying the restraint   Evaluate the patient's condition at the time of restraint application   Every 2 hours: Monitor safety, psychosocial status, comfort, nutrition and hydration  Note: Pt educated on restraints.

## 2020-08-08 PROBLEM — E87.6 HYPOKALEMIA: Status: ACTIVE | Noted: 2020-01-01

## 2020-08-08 NOTE — CARE PLAN
Problem: Communication  Goal: The ability to communicate needs accurately and effectively will improve  Outcome: PROGRESSING AS EXPECTED  Intervention: [place] patient and significant other/support system to call light to alert staff of needs  Note: Pt educated to use call light and call when in need.      Problem: Safety  Goal: Will remain free from falls  Outcome: PROGRESSING AS EXPECTED  Intervention: Implement fall precautions  Note: Fall precautions in place. Bed in lowest position. Non-skid socks in place. Personal possessions within reach. Mobility sign on door. Bed-alarm on. Call light within reach. Pt educated regarding fall prevention and states understanding.

## 2020-08-08 NOTE — CARE PLAN
Problem: Safety  Goal: Will remain free from injury  8/8/2020 0355 by Sophie Perez, R.N.  Outcome: PROGRESSING AS EXPECTED  8/8/2020 0217 by Sophie Perez, R.N.  Outcome: PROGRESSING AS EXPECTED  Goal: Will remain free from falls  8/8/2020 0355 by Sophie Perez R.N.  Outcome: PROGRESSING AS EXPECTED  8/8/2020 0217 by Sophie Perez R.N.  Outcome: PROGRESSING AS EXPECTED     Problem: Safety  Goal: Will remain free from falls  8/8/2020 0355 by Sophie Perez, R.N.  Outcome: PROGRESSING AS EXPECTED  8/8/2020 0217 by Sophie Perez, R.N.  Outcome: PROGRESSING AS EXPECTED     Problem: Safety - Medical Restraint  Goal: Remains free of injury from restraints (Restraint for Interference with Medical Device)  Description: INTERVENTIONS:  1. Determine that other, less restrictive measures have been tried or would not be effective before applying the restraint  2. Evaluate the patient's condition at the time of restraint application  3. Inform patient/family regarding the reason for restraint  4. Q2H: Monitor safety, psychosocial status, comfort, nutrition and hydration  8/8/2020 0355 by Sophie Perez, R.N.  Outcome: PROGRESSING AS EXPECTED  8/8/2020 0217 by Sophie Perez R.N.  Outcome: PROGRESSING AS EXPECTED

## 2020-08-08 NOTE — PROGRESS NOTES
Patient is restless and confused,tried to removed his restraints multiple times,unable to follow instruction,assisted patient with another staff,bed in low position.

## 2020-08-08 NOTE — PROGRESS NOTES
"Pt agitated during 1800 med pass. Had legs swinging off bed with bilateral soft wrist restraints in place. \"I need to see your scissors. I need to cut something off please\" pt kept saying to this RN. Pt was referring in cutting off restraints. Pt stated at one point \"Im gonna cut my face\".  Difficult in getting pt legs back into to bed. Pt was educated about safety and being compliant/safe in order to take off restraints. Pt given prn geodon per MAR and rt ankle restraint placed d/t pt not being cooperative and being unsafe. MD ponce paged to update on pt and on restraint. MD ok with right ankle restraint.   "

## 2020-08-08 NOTE — CARE PLAN
Problem: Safety  Goal: Will remain free from injury  Outcome: PROGRESSING AS EXPECTED     Problem: Safety  Goal: Will remain free from falls  Outcome: PROGRESSING AS EXPECTED     Problem: Safety - Medical Restraint  Goal: Remains free of injury from restraints (Restraint for Interference with Medical Device)  Description: INTERVENTIONS:  1. Determine that other, less restrictive measures have been tried or would not be effective before applying the restraint  2. Evaluate the patient's condition at the time of restraint application  3. Inform patient/family regarding the reason for restraint  4. Q2H: Monitor safety, psychosocial status, comfort, nutrition and hydration  Outcome: PROGRESSING AS EXPECTED

## 2020-08-09 NOTE — PROGRESS NOTES
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     This morning, patient's nursing reportedly found the patient altered, poorly responsive, not following commands. Stroke IR Alert was called; I did not examine the patient, but per nurse on the phone, patient has generalized weakness, and question of Right facial droop. Given lack of focal findings per exam, have relatively low suspicion for stroke; would recommend to first rule out toxic/metabolic causes of the above-- infection, electrolyte abnormalities; UA, CBC, CMP are all pending; may also check Troponin, Ammonia levels, ordered by me. OK to repeat CT head, CTA head/neck, CT perfusion. Will follow up with these studies; if further concern for focal neurological problem will plan for further neurological work up once the above is ruled out.     The plan of care above has been discussed with Dr. Buenrostro.     Kayla Veliz, A.P.R.N.      **Addendum, 1140 8/9/20:  CT head, CTA head/neck reviewed; no acute or evolving intracranial abnormality; no LVO. Of most importance, patient found to have significant leukocytosis with WBC 13.6 (last WBC 6.7 on 8/6) , differential is pending. Also note DX Chest Xray with bibasilar infiltrates.  As was noted above, feel it likely that patient's altered mentation and generalized weakness related to an infectious ie toxic/metabolic process; will defer further management and work up to primary team; if further concern for focal neurological deficit and/or acute or evolving neurological problem, please re-consult neurology.     **Addeundum 1600  8/9/20: I examined the patient; no focal neurological deficits including no facial droop, no unilateral/focal weakness; patient is awake, alert and oriented to self, place, and own age; has wet cough, with CXR findings suspect evolving pneumonia. Exceedingly low suspicion for stroke at this time, would recommend to decrease Seroquel to 12.5 BID to avoid sedation, treatment of infection per primary team. Neurology signing off, call with questions.

## 2020-08-09 NOTE — CODE DOCUMENTATION
RR called for code stroke, pt with increased lethargy, aphasia, facial droop, not following commands.  NIH 27, unknown last known well time,

## 2020-08-09 NOTE — PROGRESS NOTES
Assumed care of pt at shift change. During assessment this RN noted pt to be lethargic,  not following command with some facial droop to right side of his face. Called charged nurse, Dr Carolina was paged a code stroke was called.

## 2020-08-09 NOTE — CODE DOCUMENTATION
Lab at bedside, no stroke per Kayla Veliz, Neuro NP. Pt has obtained bed 713-2. On zoll transport monitor at this time. Rapid ended.

## 2020-08-09 NOTE — PROGRESS NOTES
[facility] Medicine Daily Progress Note    Date of Service  8/9/2020    Chief Complaint  88 y.o. male admitted 8/3/2020 with   Chief Complaint   Patient presents with   • GLF     pt had GLF 1 week ago unknown LOC; c/o L sided chest wall pain and L arm and shoulder pain; blood glucose 170 per EMS         Hospital Course  This is a 88-year-old male with past medical history significant for hyperlipidemia, hypertension presented to ER on 8/4/2020 after he had a ground-level fall.  It is noted that patient had a ground-level fall 1 week ago, complaint of left-sided chest wall pain, left arm and shoulder pain.  He reports having said a ground-level fall, denied any dizziness.     Upon presentation in ER, he is noted to have macrocytosis with MCV of 106 , mildly elevated troponin at 38.  CT chest abdomen showed left clavicular fracture appears likely subacute.  ER.  Discussed with orthopedic surgery recommended following up as an outpatient.       CT head showed subtle area of low density in the right parieto-occipital subcortical white matter could represent vasogenic edema, MRI of brain with and without contrast ordered.  Will provide neurochecks every 4 hours, on aspiration, fall, seizure precaution.     Considering macrocytosis vitamin B12 was obtained noted to be low, will provide vitamin B12 thousand MCG p.o. daily; mildly elevated TSH at 7s , will start synthroid 25 mcg po qd.     Elevated troponin at 38, repeat 27.  Echo, EKG did not show any ST and T wave changes.  Obtain orthostatic vitals        Interval Problem Update  No acute events overnight.  Early in the morning patient noted to be agitated, placed on restrain  --- Troponin trending down, echo did not show any wall motion abnormality, EF 70%, moderate aortic stenosis      8/6:  Patient is noted to be agitated, on restrain, psych consulted for assistance  - MRI brain focal encephalomalacia in the right frontal lobe    ---- hypokalemia ta 3.3   --- provide  vitamin b12 IM   --- Ortho  surgery was consulted, WBAT    8/7:  -- Patient continues to remain agitated and has been requiring restrains, psych consulted and following, will continue Depakote 250 tid along with seroquel, psych res to check Depakote level in 5 days   -- appreciate ortho input palliative following     8/8:  No acute events overnight, patient continues to remain agitated and has been requiring restrains    -- psych following and will continue their meds as per their recs    8/9:  -Patient is noted to be confused, noted to have facial droop, code stroke was called, CT and CTA of the head along with CTA perfusion scan reviewed, neurology consulted, echo reviewed.  --Patient is noted to have elevated WBC of 13, noted to have rales and crackles at the bases, aspiration precaution, lactic acid, blood culture x2 ordered.  Patient was started on  Treatment for aspiration pna with unasyn+Doxy    - speech/PTand OT  Leukocytosis at 13s    Consultants/Specialty  Neurology    Code Status  Full Code    Disposition  TBD    Review of Systems  Limited 2/2 patient mentation      Physical Exam  Temp:  [36.3 °C (97.4 °F)-36.9 °C (98.5 °F)] 36.3 °C (97.4 °F)  Pulse:  [76-80] 78  Resp:  [16-20] 18  BP: (120-149)/(63-85) 131/72  SpO2:  [90 %-97 %] 92 %    Physical Exam  Vitals signs and nursing note reviewed.   Constitutional:       Appearance: Normal appearance.   HENT:      Head: Normocephalic and atraumatic.   Eyes:      Pupils: Pupils are equal, round, and reactive to light.   Cardiovascular:      Rate and Rhythm: Normal rate and regular rhythm.      Heart sounds: Normal heart sounds.   Pulmonary:      Effort: Pulmonary effort is normal.      Comments: Rales heard   Gurgles noted  Abdominal:      General: Abdomen is flat.      Palpations: Abdomen is soft.      Tenderness: There is no abdominal tenderness.   Musculoskeletal:         General: No swelling.   Skin:     General: Skin is warm.   Neurological:      Mental  Status: He is alert.      Comments:  Somnolent and  Difficult to arouse  Limited mental status 2/2 patient mentation     Psychiatric:         Mood and Affect: Mood normal.         Fluids  No intake or output data in the 24 hours ending 08/09/20 1630    Laboratory  Recent Labs     08/09/20  1008   WBC 13.6*   RBC 4.47*   HEMOGLOBIN 16.4   HEMATOCRIT 47.1   .4*   MCH 36.7*   MCHC 34.8   RDW 50.0   PLATELETCT 226   MPV 9.7     Recent Labs     08/08/20  0947 08/09/20  0404 08/09/20  1008   SODIUM 139 139 136   POTASSIUM 3.3* 3.7 3.6   CHLORIDE 101 104 102   CO2 23 23 22   GLUCOSE 71 121* 105*   BUN 8 11 12   CREATININE 0.54 0.60 0.54   CALCIUM 8.6 8.3* 8.4*     Recent Labs     08/09/20  1008   APTT 30.0   INR 1.06               Imaging  DX-CHEST-LIMITED (1 VIEW)   Final Result      1.  Bibasilar interstitial infiltrate.      2.  Cardiomegaly.      CT-CTA NECK WITH & W/O-POST PROCESSING   Final Result      1.  Atherosclerotic plaque involving the left carotid bifurcation which results in less than 50% diameter narrowing.      2.  Atherosclerotic plaque involving the right carotid bifurcation which results in 50% diameter narrowing.      3.  Patent vertebral arteries.      CT-CTA HEAD WITH & W/O-POST PROCESS   Final Result      CT angiogram of the [place] within normal limits.      CT-CEREBRAL PERFUSION ANALYSIS   Final Result      1.  Cerebral blood flow less than 30% likely representing completed infarct = 0 mL.      2.  T Max more than 6 seconds likely representing combination of completed infarct and ischemia = 0 mL.      3.  Mismatched volume likely representing ischemic brain/penumbra = None      4.  Please note that the cerebral perfusion was performed on the limited brain tissue around the basal ganglia region. Infarct/ischemia outside the CT perfusion sections can be missed in this study.      CT-HEAD W/O   Final Result      1.  No evidence of acute intracranial process.      2.  Cerebral atrophy  as well as periventricular chronic small vessel ischemic change.      MR-BRAIN-WITH & W/O   Final Result      1.  No acute abnormality.   2.  Moderate cerebral atrophy.   3.  Moderate chronic microvascular ischemic disease.   4.  There is no evidence of vasogenic edema. There is focal encephalomalacia in the right frontal lobe likely secondary to the previous brain insult.      EC-ECHOCARDIOGRAM COMPLETE W/O CONT   Final Result      CT-CHEST (THORAX) WITH   Final Result         1.  Bilateral peripheral reticular pulmonary opacities, appearance suggests component of fibrosis.   2.  Bilateral dependent atelectasis is seen.   3.  Left clavicular fracture, appears likely subacute.   4.  Atherosclerosis and atherosclerotic coronary artery disease      Fleischner Society pulmonary nodule recommendations:         CT-HEAD W/O   Final Result         1.  Subtle area of low-density in the right parieto-occipital subcortical white matter, could represent vasogenic edema. Recommend further characterization with contrast-enhanced CT head or MRI to exclude intracranial mass lesion.   2.  Nonspecific white matter changes, commonly associated with small vessel ischemic disease.  Associated mild cerebral atrophy is noted.   3.  Atherosclerosis.      DX-SHOULDER 2+ LEFT   Final Result         1.  No acute traumatic bony injury.   2.  Degenerative changes of the acromioclavicular and glenohumeral joints.              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   -- PT and OT    -- tylenol and toradol    Clavicular fracture  Assessment & Plan  Not acute sub-acute clavicular fracture on CT chest    --- discussed with Ortho, appreciate their input    Hypokalemia  Assessment & Plan  At 3.3,  Will replete as needed     Dementia with behavioral disturbance (HCC)  Assessment & Plan  Psych consulted and recs depakote 250 mg tid  along with  seroquel   Check depakote level in 5 days     Vasogenic edema (HCC)  Assessment & Plan  Suspected vasogenic edema, MRI brain focal encephalomalacia in the right frontal lobe    - neuro-check q 4 hs; no focal deficit     Hyponatremia  Assessment & Plan  LIkely hypovolumic hyponatremia, now resolved    Macrocytic anemia  Assessment & Plan  Likely 2/2 vitamin b12 deficiency    -- will provide  IM vit B12 followed by po     Vitamin B12 deficiency  Assessment & Plan  Will provide 1000mcg of vitamin b12   IM provided by po    Fall  Assessment & Plan   Pt and OT pending        VTE prophylaxis: lovenox

## 2020-08-09 NOTE — PROGRESS NOTES
Received report and assumed care at shift change. Patient A&O x 1,  Confused. Patient asleep most of the night, no signs and symptoms of pain or distress. No attempts to get out of bed at this time, restraints discontinued per policy. Fall precautions in place,bed locked and in lowest position. Will continue to monitor.

## 2020-08-09 NOTE — PROGRESS NOTES
Bedside report received, pt care assumed, tele box on.  Pt is lethargic, asleep in bed, awakes to verbal stimuli. Bed in lowest position, bed alarm on, and call light within reach.

## 2020-08-09 NOTE — CARE PLAN
Problem: Safety  Goal: Will remain free from injury  Outcome: PROGRESSING AS EXPECTED  Goal: Will remain free from falls  Outcome: PROGRESSING AS EXPECTED     Fall precautions in place. Treaded socks on pt. Appropriate signs on doorway. IV pole on same side as bathroom. Bedrails up. Bed in lowest position and locked.  Call light and phone within reach. Patient educated on importance of calling nurses before getting out of bed, verbalizes understanding. Bed alarm on    Problem: Infection  Goal: Will remain free from infection  Outcome: PROGRESSING AS EXPECTED     Assess for signs and symptoms of infection. Monitor vital signs and lab values. Implement standard precautions and hand washing before and after pt contact.

## 2020-08-10 NOTE — PROGRESS NOTES
Assumed care this am from night shift RN. Patient resting in bed, no signs of pain or distress. Patient AxO 1, O2 @ 3L thru nasal cannula, VSS. Call bell and personal items within reach, bed locked in low position. Bed exit alarm on and plugged in. Hourly rounding in place. Tele box in place, monitor room notified.

## 2020-08-10 NOTE — PALLIATIVE CARE
Palliative Care follow-up  Received AD from pts friend, Kisha Walker. Pts DPOA is his wife, Sumi who also has dementia and is currently hospitalized also. Kisha Walker is the alternate DPOA and having spoken with her she wants the pt to be DNR/DNI.     Updated: Dr Carolina    Plan: Continue to work with pts friend and DPOA, Kisha Walker on pts POC/GOC.     Thank you for allowing Palliative Care to support this patient and family. Contact [phone] for additional assistance, change in patient status, or with any questions/concerns.

## 2020-08-10 NOTE — PROGRESS NOTES
Attending Hospitalist is Dr Carolina as day hospitalist physician through 8/10/2020 starting at 0700. Please contact this physician for orders, updates or questions today.

## 2020-08-10 NOTE — PROGRESS NOTES
"Change in patient condition due to: Neurologic  Rapid Response called at: 2206  AYAKA Casper and MD Ellen Gibbons notified at 2230    See [place] timeline for rapid response events. Patient Remained on Unit     At 2200, this RN observed client to be lethargic, non-verbal, with a more pronounced droop of the right lower aspect of mouth. Patient responded to verbal stimuli > verbal stimuli. Call Rapid Response at 2206.     Rapid Response Team (RRT) at bedside. Provided SBAR report to RRT. Patient agitated and combative moving all 4 extremities in attempt to strike staff. Patient  strength 4/4. Patient not following commands, unresponsive to questions, but staring at staff and occasionally telling staff to \"get the hell out of here.\"    Notified AYAKA Casper and Dr. Ellen Gibbons at 2230 of situation. Patient possibly sun-downing. Decision made for client to remain on unit and to minimize interruptions to promote rest. Rapid response ended at 2235.   "

## 2020-08-10 NOTE — PALLIATIVE CARE
"Palliative Care follow-up  Pt moved to Tele Unit last night. Arrived at the bedside, pt comfortable and up in the chair, asking his CNA for assistance to the bathroom.   Left and spoke with pts DPOA, Kisha, about pts POC. Discussed his frailty and his AD which states that he would not wish for life prolonging treatments. Kisha stated that he has verbalized that his quality of life is low and that he would not wish for extensive or aggressive life saving measures. Asked Kisha if she had ever discussed Hospice for end of life support with the pt or his wife, as she is DPOA for them both. Kisha stated that she felt Hospice would be a good supportive environment for them when they DC as she stated that \"they just want to be together to the end, they always said they want to die holding hands\".    Asked Estella if she had any knowledge of Hospice agencies, as we can send a referral in order to have a discussion. Estella stated that they have used [facility]  in the past and she would like a referral sent to [facility]. Let her know I will have that sent.   Discussed CC while the pts plan is being worked on in coordination with his wife's. Explained stopping unnecessary medications, a diet for safety, and less invasive measures which may also assist the pt to be more relaxed and less likely to require restraints for safety with treatment.   Estella stated that she would like to have [facility] on CC while she continues to work on a safe DC plan for him.    Choice sheet faxed to [facility] at fax#[phone] for [facility].     Updated: Dr Gonzalez, BS AYE Jessika and Latoya, Unit CM AYE Hankins, [facility] liajuan Becerra     Plan: DC with Hospice support, possible to a  for safety and support. Pts Quantiferoruben Barajas is pending, drawn on 8/7/2020    Thank you for allowing Palliative Care to support this patient and family. Contact [phone] for additional assistance, change in patient status, or with any questions/concerns.   "

## 2020-08-10 NOTE — CARE PLAN
Problem: Bowel/Gastric:  Goal: Normal bowel function is maintained or improved  Outcome: PROGRESSING AS EXPECTED  Intervention: Educate patient and significant other/support system about signs and symptoms of constipation and interventions to implement  Note: Patient maintaining adequate hydration and oral intake. Patient reports BM in the last 24 hrs. Bowel protocol in place.        Problem: Pain Management  Goal: Pain level will decrease to patient's comfort goal  Outcome: PROGRESSING AS EXPECTED  Intervention: Educate and implement non-pharmacologic comfort measures. Examples: relaxation, distration, play therapy, activity therapy, massage, etc.  Flowsheets (Taken 8/10/2020 4557)  Intervention: Rest

## 2020-08-10 NOTE — RESPIRATORY CARE
Oxygen Rounds      Patient found on    O2 L/m:  ___3______    Oxygen device:  ____nc____   Spo2: _____93____%   Respiratory device skin site inspection completed.

## 2020-08-10 NOTE — CODE DOCUMENTATION
Patient found to have AMS, right facial droop, and decrease movement on left side. Patient became increasingly agitated with stimulation, equal strength bilateral upper and lower extremities. Not answering questions, per bedside RN patient A&OX1 BL.

## 2020-08-10 NOTE — PROGRESS NOTES
"Called by rapid team.     Patient had a stroke IR alert called earlier today due to increased confusion. Per Kayla Veliz's exam, the patient was non focal, but was confused. Tonight the patient was noted to be less oriented (baseline is oriented x1-2) w/ some possible worsening facial droop but was noted by staff to be combative and to move all 4 extremities, as well as speak clearly when asking staff to \"g'o away\". Given that he had a stroke IR called earlier today and underwent full imaging at that time for a similar presentation at this time the probabilities that this represents ischemia are very low. Therefore I do not recommend repeating his imaging. No new recommendations from previous assessment by Kayla NOBLE        "

## 2020-08-10 NOTE — PROGRESS NOTES
Received bedside report from AYE Henson, pt care assumed, VSS, pt assessment complete. Pt AAOx1 oriented to self only, no c/o pain at this time. No signs of acute distress noted at this time. POC discussed with pt and verbalizes no questions. Pt denies any additional needs at this time. Bed in lowest position, bed alarm on, pt educated on fall risk and verbalized understanding, call light within reach, hourly rounding initiated.

## 2020-08-10 NOTE — CARE PLAN
Problem: Safety  Goal: Will remain free from falls  Outcome: PROGRESSING AS EXPECTED  Intervention: Assess risk factors for falls  Note: Patient educated to use call light for assistance. Fall precautions in place. Staff will assist with mobilization. Hourly rounding in place.        Problem: Communication  Goal: The ability to communicate needs accurately and effectively will improve  Outcome: PROGRESSING AS EXPECTED  Intervention: [place] patient and significant other/support system to call light to alert staff of needs  Flowsheets (Taken 8/10/2020 0132)  Oriented to:: All of the Following : Location of Bathroom, Visiting Policy, Unit Routine, Call Light and Bedside Controls, Bedside Rail Policy, Smoking Policy, Rights and Responsibilities, Bedside Report, and Patient Education Notebook

## 2020-08-10 NOTE — THERAPY
Speech Language Pathology   Initial Assessment     Patient Name: Burt Curtis  AGE:  88 y.o., SEX:  male  Medical Record #: [id number]  Today's Date: 8/10/2020     Precautions  Precautions: Fall Risk, Swallow Precautions ( See Comments)  Comments: no acute fx, subacute clavicle fx, left    Assessment    Patient is 88 y.o. male with a diagnosis of GLF with fx clavicle 8/3, with injury to L upper chest and clavicle fx.  Additional factors influencing patient status/progress: hx of multiple GLFs for both he and spouse, within home environment, + reports of agitation, combativeness, [employer].  Patient seen for BSE following transfer to chair with assist by PT/SLP.  Pt is pleasantly confused and talkative during formal assessment.  Fnxl oral motor exam for speech and swallow, and vocal quality is intermittently gurgly with saliva; pt is able to clear with verbal cues to either cough or clear throat and swallow.  All consistencies presented with pt aiding is self feeding, with the following results:  Min change in vocal quality following the swallow for ice, thin liquids and dry solid.  Pt is able to verbalize, demonstrate and utilize either throat clear/double swallow, or cough and swallow to clear vocal quality.  Given bibasilar interstitial infiltrate per chest xray 8/9, given intermittent vocal quality changes with po, recommend a modified diet and direct supervision to aide in pt's recall of recommended strategies.      Plan    Recommend Speech Therapy 3 times per week until therapy goals are met for the following treatments:  Dysphagia Training and Patient / Family / Caregiver Education.  Recommend SB6/MT2 diet with direct supervision, no straws, cues to clear throat with vocal quality changes.         Objective       08/10/20 0921   Laryngeal Function   Voice Quality Minimal   Oral Food Presentation   Ice Chips Minimal   Single Swallow Mildly Thick (2) - (Nectar Thick)  Within Functional Limits   Serial Swallow Mildly  "Thick (2) - (Nectar Thick) Minimal   Single Swallow Thin (0) Minimal   Serial Swallow Thin (0) Minimal   Regular-Easy to Chew (7) Minimal   Dysphagia Strategies / Recommendations   Strategies / Interventions Recommended (Yes / No) Yes   Compensatory Strategies Direct Supervision During Meals;Head of Bed 90 Degrees During Eating / Drinking;Cough / Clear Wet Vocal Quality Post Swallow;No Straws;Single Sips / Bites;Alternate Solids / Liquids   Diet / Liquid Recommendation Soft & Bite-Sized (6) - (Dysphagia III);Mildly Thick (2) - (Nectar Thick)   Medication Administration  Float Whole with Puree;Whole with Liquid Wash  (small pills with ntl wash)   Therapy Interventions Dysphagia Therapy By Speech Language Pathologist   Dysphagia Rating   Nutritional Liquid Intake Rating Scale Thickened beverages (mildly thick unless otherwise specified)   Nutritional Food Intake Rating Scale Total oral diet with multiple consistencies without special preparation but with specific food limitations   Patient / Family Goals   Patient / Family Goal #1 \"I want to find out if my wife is okay\"   Short Term Goals   Short Term Goal # 1 Pt will consume a SB6/MT2 diet without s/s of penetration/aspiration with direct assist and throat clear as needed post swallow.   Short Term Goal # 2 Pt will identify 'gurgly vocal quality' in 75% (or better) of occurrences, and utilize TC/double swallow to clear, with min verbal cues.   Education Group   Additional Comments dementia affecting recall, but was able to verbalize and demo throat clear/double swallow with min cues   Interdisciplinary Plan of Care Collaboration   Collaboration Comments Diet SB6/MT2 with direct supervision to ensure throat clear as needed post swallow.   Session Information   Priority 3  (3x.S6 transfer; S/sCVA;(-)CVA;confused; SB6/MT w/ TC prn)         "

## 2020-08-10 NOTE — DISCHARGE PLANNING
Received Choice form at 8128  Agency/Facility Name: [facility]  Referral sent per Choice form @ 4387

## 2020-08-10 NOTE — THERAPY
"Occupational Therapy   Initial Evaluation     Patient Name: Burt Curtis  Age:  88 y.o., Sex:  male  Medical Record #: [id number]  Today's Date: 8/10/2020       Precautions: Fall Risk, Weight Bearing As Tolerated Left Upper Extremity  Comments: per Dr Diamond no fx WBAT LUE    Assessment  Patient is 88 y.o. male with a diagnosis of GLF with left rib & shoulder pain.  No fx LUE WBAT.  Additional factors influencing patient status / progress: Pt has hx of dementia & both he & his wife have been falling at home.  It does not appear that pt & wife are able to care for themselves alone in their home.   Pt has poor insight into his deficits.  Pt would benefit from group home environment where he could have 24/7 supervision & assist as needed.     Plan    Recommend Occupational Therapy 3 times per week until therapy goals are met for the following treatments:  Adaptive Equipment, Neuro Re-Education / Balance, Self Care/Activities of Daily Living, Therapeutic Activities and Therapeutic Exercises.    DC Equipment Recommendations: Unable to determine at this time  Discharge Recommendations: Recommend post-acute placement for additional occupational therapy services prior to discharge home     Subjective    \"I'd like to see my wife, she's also in the hospital\"     Objective       08/10/20 1324   Prior Living Situation   Housing / Facility 1 Story House   Lives with - Patient's Self Care Capacity Spouse   Comments Per chart both pt & wife have dementia & both have been having frequent falls at home.  Spouse is also currently in the hospital   Prior Level of ADL Function   Self Feeding Independent   Grooming / Hygiene Independent   Bathing Requires Assist   Dressing Independent   Toileting Independent   Prior Level of IADL Function   Medication Management Requires Assist   Laundry Requires Assist   Finances Requires Assist   Home Management Requires Assist   Cognition    Cognition / Consciousness X   Speech/ Communication Delayed " Responses;Hard of Hearing   Level of Consciousness Alert   Ability To Follow Commands 1 Step   Safety Awareness Impaired   New Learning Impaired   Attention Impaired   Comments pt is pleasant & co-operative, poor insight into deficits   Strength Upper Body   Upper Body Strength  X   Gross Strength Generalized Weakness, Equal Bilaterally.    Balance Assessment   Sitting Balance (Static) Fair   Sitting Balance (Dynamic) Fair -   Standing Balance (Static) Fair -   Standing Balance (Dynamic) Poor +   Weight Shift Sitting Fair   Weight Shift Standing Fair   ADL Assessment   Eating Supervision   Grooming Minimal Assist;Seated   Bathing Moderate Assist   Upper Body Dressing Minimal Assist   Lower Body Dressing Moderate Assist   Toileting Minimal Assist   Functional Mobility   Sit to Stand Minimal Assist   Bed, Chair, Wheelchair Transfer Minimal Assist   Toilet Transfers Minimal Assist   Patient / Family Goals   Patient / Family Goal #1 I would like to see my wife   Short Term Goals   Short Term Goal # 1 Pt will be supervised with ADL transfers   Short Term Goal # 2 Pt will dress LB with supervision   Short Term Goal # 3 Pt will sit up in chair for 2 meals/day

## 2020-08-10 NOTE — CARE PLAN
Problem: Bowel/Gastric:  Goal: Normal bowel function is maintained or improved  Outcome: PROGRESSING AS EXPECTED  Intervention: Educate patient and significant other/support system about signs and symptoms of constipation and interventions to implement  Note: Patient maintaining adequate hydration and oral intake. Patient reports BM in the last 24 hrs. Bowel protocol in place.        Problem: Knowledge Deficit  Goal: Knowledge of disease process/condition, treatment plan, diagnostic tests, and medications will improve  Outcome: PROGRESSING SLOWER THAN EXPECTED  Intervention: Explain information regarding disease process/condition, treatment plan, diagnostic tests, and medications and document in education  Note: Patient oriented to self. Patient unable to verbalize understanding of plan of care.

## 2020-08-10 NOTE — THERAPY
Physical Therapy   Daily Treatment     Patient Name: Burt Curtis  Age:  88 y.o., Sex:  male  Medical Record #: [id number]  Today's Date: 8/10/2020     Precautions: Fall Risk    Assessment    Pt continues to need min assist to move to the edge of the bed and to stand.  Once standing, he is able to ambulate w/ spv and a fww.  He needs min assist to sit in the bedside chair, as he attempts to sit before being close enough to do so safely.  He remains confused.  Per chart review, he had been falling while using a cane for mobility.  Anticipate he will now benefit from a fww for increased stability and safety.      Plan    Continue current treatment plan.    DC Equipment Recommendations: Front-Wheel Walker  Discharge Recommendations: Recommend post-acute placement for additional physical therapy services prior to discharge home      Objective       08/10/20 0856   Gait Analysis   Gait Level Of Assist Supervised   Assistive Device Front Wheel Walker   Distance (Feet) 40   Deviation Shuffled Gait   Weight Bearing Status no restrictions   Skilled Intervention Verbal Cuing;Tactile Cuing   Bed Mobility    Supine to Sit Minimal Assist   Skilled Intervention Verbal Cuing;Tactile Cuing;Sequencing;Facilitation   Functional Mobility   Sit to Stand Minimal Assist   Bed, Chair, Wheelchair Transfer Minimal Assist   Skilled Intervention Verbal Cuing;Tactile Cuing;Sequencing;Facilitation   Short Term Goals    Short Term Goal # 1 Pt will be SPV for all transfers with LRAD in 6 txs to improve functional mobility.   Goal Outcome # 1 goal not met   Short Term Goal # 2 Pt will be SPV for gait >150' with LRAD in 6 txs to improve functional mobility.   Goal Outcome # 2 Goal not met   Anticipated Discharge Equipment and Recommendations   DC Equipment Recommendations Front-Wheel Walker   Discharge Recommendations Recommend post-acute placement for additional physical therapy services prior to discharge home

## 2020-08-10 NOTE — DISCHARGE PLANNING
Anticipated Discharge Disposition: Hospice at Assisted Living    Action: Palliative Care met with the patient and his POA today.  They gave consent for a referral to be sent to [facility].  Patient will be unable to return home on discharge d/t lack of resources.  POA is working on coordination of care for both  and wife.  Looking at a possible group home placement for patient and his spouse w/ hospice services.  Called and left a voicemail for Mariam w/ [facility] to follow up with this RN PEACE re: next steps for dc planning.    Barriers to Discharge: case coordination w/ hospice and placement on discharge.     Plan: RN CM to follow up with hospice, [facility] staff, and w/ DPOA for further dc planning     0 = swallows foods/liquids without difficulty

## 2020-08-11 PROBLEM — R13.10 DYSPHAGIA: Status: ACTIVE | Noted: 2020-01-01

## 2020-08-11 PROBLEM — E87.6 HYPOKALEMIA: Status: RESOLVED | Noted: 2020-01-01 | Resolved: 2020-01-01

## 2020-08-11 PROBLEM — Z71.89 ADVANCE CARE PLANNING: Status: ACTIVE | Noted: 2020-01-01

## 2020-08-11 PROBLEM — J69.0 ASPIRATION PNEUMONIA (HCC): Status: ACTIVE | Noted: 2020-01-01

## 2020-08-11 PROBLEM — E87.1 HYPONATREMIA: Status: RESOLVED | Noted: 2020-01-01 | Resolved: 2020-01-01

## 2020-08-11 NOTE — PROGRESS NOTES
"Patient beginning to become agitated at 1500. Patient attempting to \"jump out window\" and expressing that he believes the hospital staff is trying to kill him. Patient refusing to stay in chair or bed, explaining that he needs to leave the hospital to see his wife who is waiting in the car. Patient attempting to place oxygen tank on bed alarm to subdue alarm. Patient becoming increasingly agitated and aggressive.   Patient medicated with Geodon at 1716.   "

## 2020-08-11 NOTE — CARE PLAN
Problem: Safety  Goal: Will remain free from falls  Outcome: PROGRESSING AS EXPECTED  Intervention: Implement fall precautions  Flowsheets  Taken 8/11/2020 0915  Bed Alarm: Yes - Alarm On  Chair/Bed Strip Alarm: Yes - Alarm On  Taken 8/11/2020 0900  Environmental Precautions:   Treaded Slipper Socks on Patient   Personal Belongings, Wastebasket, Call Bell etc. in Easy Reach   Report Given to Other Health Care Providers Regarding Fall Risk   Bed in Low Position   Communication Sign for Patients & Families   Mobility Assessed & Appropriate Sign Placed  Note: Patient AxO 4, though becomes more confused in afternoon/evening. Patient does not use call light appropriately. Fall precautions in place.      Problem: Bowel/Gastric:  Goal: Normal bowel function is maintained or improved  Outcome: PROGRESSING AS EXPECTED  Intervention: Educate patient and significant other/support system about signs and symptoms of constipation and interventions to implement  Note: Patient maintaining adequate hydration and oral intake. Patient reports BM in the last 24 hrs. Bowel protocol in place.

## 2020-08-11 NOTE — ASSESSMENT & PLAN NOTE
Patient is noted to have gurgling sound when eating/drinking.   - comfort care  - accepting risk and can eat as tolerated

## 2020-08-11 NOTE — CARE PLAN
Problem: Nutritional:  Goal: Achieve adequate nutritional intake  Description: Patient will continue to consume >50% of meals  Outcome: MET     PO intake variable 0-100% (several >50%) + snacks, likely meeting needs for advanced age. RD to follow weekly.

## 2020-08-11 NOTE — DISCHARGE PLANNING
Anticipated Discharge Disposition: group home w/ Hospice    Action: Received phone call from Mariam w/ [facility] and they are will to accept the patient for their services.  Mariam stated that she had spoke with Tito, and that she is in the process of getting their house ready to sell in order to pay for placement.  Her goal is to put them together @ [facility] w/ [facility].    Received a phone call from AYE Zurita CM on CNU, stating that Tito is needing documentation that the patient is not able to make his own decisions so that she can go ahead with the sale of their house.  Spoke with Krysta hospitalist RN, and she was going to get with Dr. Larson to obtain the needed documentation.  (will most likely need to be from psych since it is a financial decision, but Dr. Larson to make that decision.    Discussed patient during IDT rounds w/ medical team and request has been made for him to be placed with his wife for their comfort and better coordination of post-acute care.    Barriers to Discharge: coordination of dc placement with hospice and POA    Plan: RN CM to follow up with hospitalist RN re: status of documentation.

## 2020-08-11 NOTE — PROGRESS NOTES
[facility] Medicine Daily Progress Note    Date of Service  8/10/2020    Chief Complaint  88 y.o. male admitted 8/3/2020 with   Chief Complaint   Patient presents with   • GLF     pt had GLF 1 week ago unknown LOC; c/o L sided chest wall pain and L arm and shoulder pain; blood glucose 170 per EMS         Hospital Course  This is a 88-year-old male with past medical history significant for hyperlipidemia, hypertension, dementia  presented to ER on 8/4/2020 after he was noted to  Have left sided shoulder pain and chest wall pain.  It is noted that patient had a ground-level fall 1 week ago, complaint of left-sided chest wall pain, left arm and shoulder pain.  He reports having said a ground-level fall, denied any dizziness.     Upon presentation in ER, he is noted to have macrocytosis with MCV of 106 , mildly elevated troponin at 38.  CT chest abdomen showed left clavicular fracture appears likely subacute.  ER.  Discussed with orthopedic surgery Dr Shields, appreciated his input.     CT head showed subtle area of low density in the right parieto-occipital subcortical white matter could represent vasogenic edema, MRI of brain  Showed encephalomalacia. Patient didn't have any neurological deficit. Considering macrocytosis vitamin B12 was obtained noted to be low, will provide vitamin B12 thousand MCG p.o. daily; mildly elevated TSH at 7s , will start synthroid 25 mcg po qd.     He is levated troponin at 38-->28-->22 ; EKG  Didn't show any ST and T wave changes; Echo showed  EF &0% and Moderate AS.  On 8/9/2020, patient noted to be confused agitated with facial droop, CT/CTA chest along with CT perfusion scan--> no acute ischemic CVA noted, reviewed records demonstrate that likely etiology is infectious.  Patient is noted to have elevated WBC at 13, noted to have gurgling sound chest x-ray consistent with pneumonia, patient was started on aspiration precaution, started on Rocephin plus doxycycline and was transferred to  telemetry for close monitoring.  Speech evaluated and that was started as per speech therapy recommendation.  Palliative continue to follow the patient, after next visit discussion with the palliative team with patient's power of , plan is to send patient and his wife to group home on hospice.  Hospice referral has been placed.  At this time his antibiotics were changed to oral antibiotics including Augmentin and doxycycline.    Continue to follow the patient  Patient is noted to be confused, noted to have facial droop, code stroke was called, CT and CTA of the head along with CTA perfusion scan reviewed, neurology consulted, echo reviewed.  --Patient is noted to have elevated WBC of 13, noted to have rales and crackles at the bases, aspiration precaution, lactic acid, blood culture x2 ordered.  Patient was started on  Treatment for aspiration pna with unasyn+Doxy    - speech/PTand OT  Leukocytosis at 13s         Interval Problem Update  No acute events overnight.  Early in the morning patient noted to be agitated, placed on restrain  --- Troponin trending down, echo did not show any wall motion abnormality, EF 70%, moderate aortic stenosis      8/6:  Patient is noted to be agitated, on restrain, psych consulted for assistance  - MRI brain focal encephalomalacia in the right frontal lobe    ---- hypokalemia ta 3.3   --- provide vitamin b12 IM   --- Ortho  surgery was consulted, WBAT    8/7:  -- Patient continues to remain agitated and has been requiring restrains, psych consulted and following, will continue Depakote 250 tid along with seroquel, psych res to check Depakote level in 5 days   -- appreciate ortho input palliative following     8/8:  No acute events overnight, patient continues to remain agitated and has been requiring restrains    -- psych following and will continue their meds as per their recs    8/9:  -Patient is noted to be confused, noted to have facial droop, code stroke was called, CT  and CTA of the head along with CTA perfusion scan reviewed, neurology consulted, echo reviewed.  --Patient is noted to have elevated WBC of 13, noted to have rales and crackles at the bases, aspiration precaution, lactic acid, blood culture x2 ordered.  Patient was started on  Treatment for aspiration pna with unasyn+Doxy    - speech/PTand OT  Leukocytosis at 13s      8/10: No acute events overnight, laying in her bed.  Met with the palliative and discussed the plan of care, hospice referral has been placed, case management updated.  WBC at 13, change antibiotics from Unasyn and doxycycline to Augmentin plus doxycycline.  Vitals stable, transfer orders to medical placed  --- He is noted to be alert and oriented x3; he is able to tell me his name, stated that he is not the hospital, Reji is the president.    Consultants/Specialty  Neurology  Palliative    Code Status  DNAR/DNI    Disposition  TBD    Review of Systems  Limited 2/2 patient mentation      Physical Exam  Temp:  [36.1 °C (97 °F)-36.4 °C (97.5 °F)] 36.1 °C (97 °F)  Pulse:  [66-85] 85  Resp:  [14-19] 18  BP: (124-198)/() 150/75  SpO2:  [93 %-100 %] 95 %    Physical Exam  Vitals signs and nursing note reviewed.   Constitutional:       Appearance: Normal appearance.   HENT:      Head: Normocephalic and atraumatic.   Eyes:      Pupils: Pupils are equal, round, and reactive to light.   Cardiovascular:      Rate and Rhythm: Normal rate and regular rhythm.      Heart sounds: Normal heart sounds.   Pulmonary:      Effort: Pulmonary effort is normal.      Comments: Rales heard   Gurgles noted  Abdominal:      General: Abdomen is flat.      Palpations: Abdomen is soft.      Tenderness: There is no abdominal tenderness.   Musculoskeletal:         General: No swelling.   Skin:     General: Skin is warm.   Neurological:      Mental Status: He is alert.      Comments:  Somnolent and  Difficult to arouse  Limited mental status 2/2 patient mentation      Psychiatric:         Mood and Affect: Mood normal.         Fluids  No intake or output data in the 24 hours ending 08/10/20 1902    Laboratory  Recent Labs     08/09/20  1008 08/10/20  0942   WBC 13.6* 13.5*   RBC 4.47* 4.57*   HEMOGLOBIN 16.4 16.7   HEMATOCRIT 47.1 49.5   .4* 108.3*   MCH 36.7* 36.5*   MCHC 34.8 33.7   RDW 50.0 52.7*   PLATELETCT 226 225   MPV 9.7 10.8     Recent Labs     08/09/20  0404 08/09/20  1008 08/10/20  0942   SODIUM 139 136 143   POTASSIUM 3.7 3.6 3.4*   CHLORIDE 104 102 104   CO2 23 22 25   GLUCOSE 121* 105* 141*   BUN 11 12 13   CREATININE 0.60 0.54 0.63   CALCIUM 8.3* 8.4* 9.0     Recent Labs     08/09/20  1008   APTT 30.0   INR 1.06               Imaging  DX-CHEST-LIMITED (1 VIEW)   Final Result      1.  Bibasilar interstitial infiltrate.      2.  Cardiomegaly.      CT-CTA NECK WITH & W/O-POST PROCESSING   Final Result      1.  Atherosclerotic plaque involving the left carotid bifurcation which results in less than 50% diameter narrowing.      2.  Atherosclerotic plaque involving the right carotid bifurcation which results in 50% diameter narrowing.      3.  Patent vertebral arteries.      CT-CTA HEAD WITH & W/O-POST PROCESS   Final Result      CT angiogram of the [place] within normal limits.      CT-CEREBRAL PERFUSION ANALYSIS   Final Result      1.  Cerebral blood flow less than 30% likely representing completed infarct = 0 mL.      2.  T Max more than 6 seconds likely representing combination of completed infarct and ischemia = 0 mL.      3.  Mismatched volume likely representing ischemic brain/penumbra = None      4.  Please note that the cerebral perfusion was performed on the limited brain tissue around the basal ganglia region. Infarct/ischemia outside the CT perfusion sections can be missed in this study.      CT-HEAD W/O   Final Result      1.  No evidence of acute intracranial process.      2.  Cerebral atrophy as well as periventricular chronic small  vessel ischemic change.      MR-BRAIN-WITH & W/O   Final Result      1.  No acute abnormality.   2.  Moderate cerebral atrophy.   3.  Moderate chronic microvascular ischemic disease.   4.  There is no evidence of vasogenic edema. There is focal encephalomalacia in the right frontal lobe likely secondary to the previous brain insult.      EC-ECHOCARDIOGRAM COMPLETE W/O CONT   Final Result      CT-CHEST (THORAX) WITH   Final Result         1.  Bilateral peripheral reticular pulmonary opacities, appearance suggests component of fibrosis.   2.  Bilateral dependent atelectasis is seen.   3.  Left clavicular fracture, appears likely subacute.   4.  Atherosclerosis and atherosclerotic coronary artery disease      Fleischner Society pulmonary nodule recommendations:         CT-HEAD W/O   Final Result         1.  Subtle area of low-density in the right parieto-occipital subcortical white matter, could represent vasogenic edema. Recommend further characterization with contrast-enhanced CT head or MRI to exclude intracranial mass lesion.   2.  Nonspecific white matter changes, commonly associated with small vessel ischemic disease.  Associated mild cerebral atrophy is noted.   3.  Atherosclerosis.      DX-SHOULDER 2+ LEFT   Final Result         1.  No acute traumatic bony injury.   2.  Degenerative changes of the acromioclavicular and glenohumeral joints.              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  Orthopedic surgery consulted, appreciate their input   -- PT and OT    -- tylenol and toradol    Clavicular fracture  Assessment & Plan  Not acute sub-acute clavicular fracture on CT chest    --- discussed with Ortho, appreciate their input    Advance care planning  Assessment & Plan  After an extensive discussion by palliative team with patient and his wife power of , plan is to discharge patient and his wife on  hospice to group home.   --- Hospice referral placed, case management updated    Aspiration pneumonia (HCC)  Assessment & Plan  Likely secondary to aspiration event, blood cultures x2, pending   --- Was on Unasyn plus doxy, switched to Augmentin plus doxy    Dysphagia  Assessment & Plan  Patient is noted to have gurgling sound, speech therapy was consulted, diet as per speech therapy recommendation    Hypokalemia  Assessment & Plan  Replete and repeat    Dementia with behavioral disturbance (HCC)  Assessment & Plan  Psych consulted and recs depakote 250 mg tid  along with seroquel   Check depakote level in 5 days     Vasogenic edema (HCC)  Assessment & Plan  Suspected vasogenic edema in CT,, MRI brain focal encephalomalacia in the right frontal lobe and noted no edema noted   - neuro-check q 4 hs; no focal deficit     Hyponatremia  Assessment & Plan  LIkely hypovolumic hyponatremia, now resolved    Macrocytic anemia  Assessment & Plan  Likely 2/2 vitamin b12 deficiency    -- will provide  IM vit B12 followed by po     Vitamin B12 deficiency  Assessment & Plan  Will provide 1000mcg of vitamin b12   IM provided by po    Fall  Assessment & Plan   Pt and OT pending        VTE prophylaxis: lovenox

## 2020-08-11 NOTE — PROGRESS NOTES
Assumed care this am from night shift RN. Patient resting in bed, no signs of pain or distress. Patient AxO 2, O2 @ room air, VSS. Call bell and personal items within reach, bed locked in low position. Bed exit alarm on and plugged in. Hourly rounding in place.

## 2020-08-11 NOTE — DISCHARGE PLANNING
Agency/Facility Name: [facility]  Spoke To: Sherry  Outcome: Waiting for liaison to visit patient.

## 2020-08-11 NOTE — DISCHARGE PLANNING
Care Transition Team Assessment    RN PEACE completed chart review to complete transition assessment.  Prior to admission to hospital, patient was living at home with his wife in [place].  He has a friend, Kisha Walker, who is his POA.  Patient's wife is also hospitalized on [facility] at this time.  See previous dc planning notes from this hospital stay for detailed dc plan.    DC Plan:  custodial w/ [facility]    Information Source  Orientation : Disoriented to Time, Disoriented to Place, Disoriented to Event  Information Given By: Other (Comments)  Informant's Name: chart review    Readmission Evaluation  Is this a readmission?: No    Elopement Risk  Legal Hold: No  Ambulatory or Self Mobile in Wheelchair: Yes  Disoriented: No  Psychiatric Symptoms: None  History of Wandering: No  Elopement this Admit: No  Vocalizing Wanting to Leave: No  Displays Behaviors, Body Language Wanting to Leave: No-Not at Risk for Elopement  Elopement Risk: Not at Risk for Elopement    Interdisciplinary Discharge Planning  Does Admitting Nurse Feel This Could be a Complex Discharge?: No  Primary Care Physician: Dr. Ant Byrnes  Lives with - Patient's Self Care Capacity: Spouse  Patient or legal guardian wants to designate a caregiver (see row info): No  Support Systems: Friends / Neighbors, Spouse / Significant Other  Housing / Facility: [address]  Do You Take your Prescribed Medications Regularly: Yes  Reasons Why Not Taking Medications : (states does not have any meds to take)  Able to Return to Previous ADL's: No  Mobility Issues: Yes  Prior Services: Home-Independent  Patient Expects to be Discharged to:: hospice  Assistance Needed: Yes  Durable Medical Equipment: Walker    Discharge Preparedness  What is your plan after discharge?: Uncertain - pending medical team collaboration, Other (comment)  What are your discharge supports?: Other (comment)(friend, Kisha)  Prior Functional Level: Independent with Activities of Daily Living,  Independent with Medication Management  Difficulity with ADLs: None  Difficulity with IADLs: None    Functional Assesment  Prior Functional Level: Independent with Activities of Daily Living, Independent with Medication Management    Finances  Financial Barriers to Discharge: No  Prescription Coverage: Yes    Vision / Hearing Impairment  Vision Impairment : Yes  Right Eye Vision: Impaired, Wears Glasses  Left Eye Vision: Impaired, Wears Glasses  Hearing Impairment : Yes  Hearing Impairment: Both Ears, Hearing Device Not Available  Does Pt Need Special Equipment for the Hearing Impaired?: No         Advance Directive  Advance Directive?: DPOA for Health Care  Durable Power of  Name and Contact : Tito Walker, [phone]    Domestic Abuse  Have you ever been the victim of abuse or violence?: No  Physical Abuse or Sexual Abuse: No  Verbal Abuse or Emotional Abuse: No  Possible Abuse Reported to:: Not Applicable    Psychological Assessment  History of Substance Abuse: None  History of Psychiatric Problems: No  Non-compliant with Treatment: No    Discharge Risks or Barriers  Discharge risks or barriers?: Post-acute placement / services  Patient risk factors: Lack of outside supports, Vulnerable adult    Anticipated Discharge Information  Discharge Disposition: D/T to facility providing alf/supportive care (04)  Discharge Address: [address] [place] [address] [place] [address]  Discharge Contact Phone Number: [phone]

## 2020-08-11 NOTE — PROGRESS NOTES
Received bedside report from RN Jessika, pt care assumed, VSS, pt assessment complete. Pt plesantly confused at this time, AAOx2 disoriented to place and event, no c/o pain at this time. No signs of acute distress noted at this time. POC discussed with pt and verbalizes no questions. Pt denies any additional needs at this time. Bed in lowest position, bed alarm on, pt educated on fall risk and verbalized understanding, call light within reach, hourly rounding initiated.

## 2020-08-11 NOTE — CARE PLAN
Problem: Safety  Goal: Will remain free from falls  Outcome: PROGRESSING AS EXPECTED  Intervention: Assess risk factors for falls  Note: Patient educated to use call light for assistance. Fall precautions in place. Staff will assist with mobilization. Hourly rounding in place.      Problem: Psychosocial Needs:  Goal: Level of anxiety will decrease  Outcome: PROGRESSING AS EXPECTED  Intervention: Collaborate with Interdisciplinary Team including Psychologist/Behavioral Health Team  Note: Provided patient with a calm environment. Provided therapeutic 1-1 conversation and presence. Avoided any chance of confrontation with client while patient seemingly agitated. Patient's behavior is very pleasant at this time.

## 2020-08-11 NOTE — PROGRESS NOTES
Patient pleasantly confused and mildly agitated after laboratory tech attempted to obtain lab specimens. Will re-attempt blood draw at 0600. Patient A&Ox2 disoriented to place and situation; [place] the airport and that he and his wife are travelling.

## 2020-08-11 NOTE — DISCHARGE PLANNING
Anticipated Discharge Disposition: Home w/ [facility] (possibly to a group home)    Action: Spoke with Mariam, liaison from [facility], and she has been in touch with the POA, Kisha, and they are working on finding placement for the patient and his wife.      Barriers to Discharge: coordination of dc placement w/ hospice    Plan: RN CM remains available to assist with dc planning needs.  F/U w/ Mariam and [facility] HCM/LSW for any updates or changes in the plan.

## 2020-08-11 NOTE — PROGRESS NOTES
Hospital Medicine Daily Progress Note    Date of Service  8/11/2020    Chief Complaint  88 y.o. male admitted 8/3/2020 with   Chief Complaint   Patient presents with   • GLF     pt had GLF 1 week ago unknown LOC; c/o L sided chest wall pain and L arm and shoulder pain; blood glucose 170 per EMS         Hospital Course  This is a 88-year-old male with past medical history significant for hyperlipidemia, hypertension, dementia  presented to ER on 8/4/2020 after he was noted to  Have left sided shoulder pain and chest wall pain.  It is noted that patient had a ground-level fall 1 week ago, complaint of left-sided chest wall pain, left arm and shoulder pain.  He reports having said a ground-level fall, denied any dizziness.     Upon presentation in ER, he is noted to have macrocytosis with MCV of 106 , mildly elevated troponin at 38.  CT chest abdomen showed left clavicular fracture appears likely subacute.  ER.  Discussed with orthopedic surgery Dr Shields, appreciated his input.     CT head showed subtle area of low density in the right parieto-occipital subcortical white matter could represent vasogenic edema, MRI of brain  Showed encephalomalacia. Patient didn't have any neurological deficit. Considering macrocytosis vitamin B12 was obtained noted to be low, will provide vitamin B12 thousand MCG p.o. daily; mildly elevated TSH at 7s , will start synthroid 25 mcg po qd.     He is levated troponin at 38-->28-->22 ; EKG  Didn't show any ST and T wave changes; Echo showed  EF &0% and Moderate AS.  On 8/9/2020, patient noted to be confused agitated with facial droop, CT/CTA chest along with CT perfusion scan--> no acute ischemic CVA noted, reviewed records demonstrate that likely etiology is infectious.  Patient is noted to have elevated WBC at 13, noted to have gurgling sound chest x-ray consistent with pneumonia, patient was started on aspiration precaution, started on Rocephin plus doxycycline and was transferred to  telemetry for close monitoring.  Speech evaluated and that was started as per speech therapy recommendation.  Palliative continue to follow the patient, after next visit discussion with the palliative team with patient's power of , plan is to send patient and his wife to group home on hospice.  Hospice referral has been placed.  At this time his antibiotics were changed to oral antibiotics including Augmentin and doxycycline.    Continue to follow the patient  Patient is noted to be confused, noted to have facial droop, code stroke was called, CT and CTA of the head along with CTA perfusion scan reviewed, neurology consulted, echo reviewed.  --Patient is noted to have elevated WBC of 13, noted to have rales and crackles at the bases, aspiration precaution, lactic acid, blood culture x2 ordered.  Patient was started on  Treatment for aspiration pna with unasyn+Doxy    - speech/PTand OT  Leukocytosis at 13s         Interval Problem Update  Pain-well controlled currently. Intermittent agitation that seems to respond to low dose haldol.   Palliative care changed his code status to DNR/DNI last night and hospice consult was placed. Patient is confused but pleasant.     Consultants/Specialty  Neurology  Palliative    Code Status  DNAR/DNI    Disposition  TBD, working on [facility] memory care unit with SNF    Review of Systems  Limited 2/2 patient mentation      Physical Exam  Temp:  [35.9 °C (96.6 °F)-36.1 °C (97 °F)] 36.1 °C (97 °F)  Pulse:  [58-85] 58  Resp:  [16-18] 16  BP: (136-150)/(70-83) 136/70  SpO2:  [94 %-96 %] 94 %    Physical Exam  Vitals signs and nursing note reviewed.   Constitutional:       Appearance: Normal appearance.      Comments: Confused, but interactive   HENT:      Head: Normocephalic and atraumatic.   Eyes:      Pupils: Pupils are equal, round, and reactive to light.   Cardiovascular:      Rate and Rhythm: Normal rate and regular rhythm.      Heart sounds: Normal heart sounds.   Pulmonary:       Effort: Pulmonary effort is normal.      Comments: Rales heard   Abdominal:      General: Abdomen is flat.      Palpations: Abdomen is soft.      Tenderness: There is no abdominal tenderness.   Musculoskeletal:         General: No swelling.   Skin:     General: Skin is warm.   Neurological:      Mental Status: He is alert.      Comments:      Psychiatric:         Mood and Affect: Mood normal.         Fluids    Intake/Output Summary (Last 24 hours) at 8/11/2020 1415  Last data filed at 8/11/2020 1401  Gross per 24 hour   Intake 840 ml   Output --   Net 840 ml       Laboratory  Recent Labs     08/09/20  1008 08/10/20  0942 08/11/20  0841   WBC 13.6* 13.5* 8.8   RBC 4.47* 4.57* 4.26*   HEMOGLOBIN 16.4 16.7 15.7   HEMATOCRIT 47.1 49.5 45.0   .4* 108.3* 105.6*   MCH 36.7* 36.5* 36.9*   MCHC 34.8 33.7 34.9   RDW 50.0 52.7* 49.4   PLATELETCT 226 225 184   MPV 9.7 10.8 11.3     Recent Labs     08/09/20  1008 08/10/20  0942 08/11/20  0841   SODIUM 136 143 140   POTASSIUM 3.6 3.4* 3.2*   CHLORIDE 102 104 103   CO2 22 25 23   GLUCOSE 105* 141* 88   BUN 12 13 14   CREATININE 0.54 0.63 0.50   CALCIUM 8.4* 9.0 8.9     Recent Labs     08/09/20  1008   APTT 30.0   INR 1.06               Imaging  DX-CHEST-LIMITED (1 VIEW)   Final Result      1.  Bibasilar interstitial infiltrate.      2.  Cardiomegaly.      CT-CTA NECK WITH & W/O-POST PROCESSING   Final Result      1.  Atherosclerotic plaque involving the left carotid bifurcation which results in less than 50% diameter narrowing.      2.  Atherosclerotic plaque involving the right carotid bifurcation which results in 50% diameter narrowing.      3.  Patent vertebral arteries.      CT-CTA HEAD WITH & W/O-POST PROCESS   Final Result      CT angiogram of the [place] within normal limits.      CT-CEREBRAL PERFUSION ANALYSIS   Final Result      1.  Cerebral blood flow less than 30% likely representing completed infarct = 0 mL.      2.  T Max more than 6 seconds  likely representing combination of completed infarct and ischemia = 0 mL.      3.  Mismatched volume likely representing ischemic brain/penumbra = None      4.  Please note that the cerebral perfusion was performed on the limited brain tissue around the basal ganglia region. Infarct/ischemia outside the CT perfusion sections can be missed in this study.      CT-HEAD W/O   Final Result      1.  No evidence of acute intracranial process.      2.  Cerebral atrophy as well as periventricular chronic small vessel ischemic change.      MR-BRAIN-WITH & W/O   Final Result      1.  No acute abnormality.   2.  Moderate cerebral atrophy.   3.  Moderate chronic microvascular ischemic disease.   4.  There is no evidence of vasogenic edema. There is focal encephalomalacia in the right frontal lobe likely secondary to the previous brain insult.      EC-ECHOCARDIOGRAM COMPLETE W/O CONT   Final Result      CT-CHEST (THORAX) WITH   Final Result         1.  Bilateral peripheral reticular pulmonary opacities, appearance suggests component of fibrosis.   2.  Bilateral dependent atelectasis is seen.   3.  Left clavicular fracture, appears likely subacute.   4.  Atherosclerosis and atherosclerotic coronary artery disease      Fleischner Society pulmonary nodule recommendations:         CT-HEAD W/O   Final Result         1.  Subtle area of low-density in the right parieto-occipital subcortical white matter, could represent vasogenic edema. Recommend further characterization with contrast-enhanced CT head or MRI to exclude intracranial mass lesion.   2.  Nonspecific white matter changes, commonly associated with small vessel ischemic disease.  Associated mild cerebral atrophy is noted.   3.  Atherosclerosis.      DX-SHOULDER 2+ LEFT   Final Result         1.  No acute traumatic bony injury.   2.  Degenerative changes of the acromioclavicular and glenohumeral joints.              Assessment/Plan  Intractable pain- (present on  admission)  Assessment & Plan  Secondary to gait disorder with recurrent falls resulting in left clavicular fracture.  Orthopedic surgery consulted, appreciate their input   -- PT and OT    -- tylenol and toradol  -pain is currently well controlled  -hospice referral has been placed    Clavicular fracture- (present on admission)  Assessment & Plan  Not acute sub-acute clavicular fracture on CT chest    --- discussed with Ortho, appreciate their input    Advance care planning- (present on admission)  Assessment & Plan  -palliative care following  -changed to DNR/DNI  -hospice referral palced    Aspiration pneumonia (HCC)- (present on admission)  Assessment & Plan  Likely secondary to aspiration event, blood cultures x2, pending   --- Was on Unasyn plus doxy, switch to augmentin only, doing well, remains afebrile    Dysphagia- (present on admission)  Assessment & Plan  Patient is noted to have gurgling sound, speech therapy was consulted, diet as per speech therapy recommendation    Dementia with behavioral disturbance (HCC)- (present on admission)  Assessment & Plan  Psych consulted and recs depakote 250 mg tid  along with seroquel   Check depakote level in 5 days   -will check soon  -geodon IM and haldol IM PRN  -better controlled today    Vasogenic edema (HCC)- (present on admission)  Assessment & Plan  Suspected vasogenic edema in CT,, MRI brain focal encephalomalacia in the right frontal lobe and noted no edema noted   - neuro-check q 4 hs; no focal deficit     Macrocytic anemia- (present on admission)  Assessment & Plan  Likely 2/2 vitamin b12 deficiency    -- will provide  IM vit B12 followed by po     Vitamin B12 deficiency- (present on admission)  Assessment & Plan  Will provide 1000mcg of vitamin b12   IM provided by po    Fall- (present on admission)  Assessment & Plan   Pt and OT, needs arbors with hospice       VTE prophylaxis: lovenox    ROS

## 2020-08-12 NOTE — CARE PLAN
Problem: Communication  Goal: The ability to communicate needs accurately and effectively will improve  Outcome: PROGRESSING AS EXPECTED     Problem: Safety  Goal: Will remain free from injury  Outcome: PROGRESSING AS EXPECTED  Goal: Will remain free from falls  Outcome: PROGRESSING AS EXPECTED     Problem: Respiratory:  Goal: Respiratory status will improve  Outcome: PROGRESSING AS EXPECTED     Problem: Skin Integrity  Goal: Risk for impaired skin integrity will decrease  Outcome: PROGRESSING AS EXPECTED

## 2020-08-12 NOTE — PROGRESS NOTES
.Herbie from Lab called with critical result of Potassium of 2.7 at 0943. Critical lab result read back to Herbie.   Dr. Larson notified of critical lab result at 0945.  Critical lab result read back by Dr. Larson.

## 2020-08-12 NOTE — PROGRESS NOTES
Assumed care this am from night shift RN. Patient resting in bed, no signs of pain or distress. Patient AxO 1, O2 @ room air, VSS. Call bell and personal items within reach, bed locked in low position. Bed exit alarm on and plugged in. Hourly rounding in place.

## 2020-08-12 NOTE — CARE PLAN
Problem: Bowel/Gastric:  Goal: Normal bowel function is maintained or improved  Outcome: PROGRESSING AS EXPECTED  Intervention: Educate patient and significant other/support system about signs and symptoms of constipation and interventions to implement  Note: Patient maintaining adequate hydration and oral intake. Patient reports BM in the last 24 hrs. Bowel protocol in place.        Problem: Pain Management  Goal: Pain level will decrease to patient's comfort goal  Outcome: PROGRESSING AS EXPECTED  Intervention: Educate and implement non-pharmacologic comfort measures. Examples: relaxation, distration, play therapy, activity therapy, massage, etc.  Flowsheets (Taken 8/12/2020 9185)  Intervention:   Rest   [place]

## 2020-08-12 NOTE — PROGRESS NOTES
Received bedside report from RN, pt care assumed, VSS, pt assessment complete. Pt AAOx2, 0/10 pain at this time. No signs of acute distress noted at this time. Pt denies any additional needs at this time. Bed in lowest position, bed alarm in place, call light within reach, hourly rounding initiated.

## 2020-08-12 NOTE — PSYCHIATRY
"Psychiatry consult requested for: \"evaluation for capacity to make financial decisions (trying to sell house given wife and patient need to go to [facility]\".   Requesting provider: Adrian Larson M.D.    I discussed this case and request with Dr Angeline Vasquez. Financial decisions, including buying and selling properties, is out of our scope of practice. This would require access to his finances, which is not part our practice and setting. Also, this is not an urgent issue in the inpatient setting. We would like to recommend neuro psycho testing in the outpatient setting and request guidance from an  on how to proceed with this case in the outpatient setting.     Consult is being canceled.           "

## 2020-08-13 NOTE — PROGRESS NOTES
Hospital Medicine Daily Progress Note    Date of Service  8/13/2020    Chief Complaint  88 y.o. male admitted 8/3/2020 with   Chief Complaint   Patient presents with   • GLF     pt had GLF 1 week ago unknown LOC; c/o L sided chest wall pain and L arm and shoulder pain; blood glucose 170 per EMS         Hospital Course  This is a 88-year-old male with past medical history significant for hyperlipidemia, hypertension, dementia  presented to ER on 8/4/2020 after he was noted to  Have left sided shoulder pain and chest wall pain.  It is noted that patient had a ground-level fall 1 week ago, complaint of left-sided chest wall pain, left arm and shoulder pain.  He reports having said a ground-level fall, denied any dizziness.     Upon presentation in ER, he is noted to have macrocytosis with MCV of 106 , mildly elevated troponin at 38.  CT chest abdomen showed left clavicular fracture appears likely subacute.  ER.  Discussed with orthopedic surgery Dr Shields, appreciated his input.     CT head showed subtle area of low density in the right parieto-occipital subcortical white matter could represent vasogenic edema, MRI of brain  Showed encephalomalacia. Patient didn't have any neurological deficit. Considering macrocytosis vitamin B12 was obtained noted to be low, will provide vitamin B12 thousand MCG p.o. daily; mildly elevated TSH at 7s , will start synthroid 25 mcg po qd.     He is levated troponin at 38-->28-->22 ; EKG  Didn't show any ST and T wave changes; Echo showed  EF &0% and Moderate AS.  On 8/9/2020, patient noted to be confused agitated with facial droop, CT/CTA chest along with CT perfusion scan--> no acute ischemic CVA noted, reviewed records demonstrate that likely etiology is infectious.  Patient is noted to have elevated WBC at 13, noted to have gurgling sound chest x-ray consistent with pneumonia, patient was started on aspiration precaution, started on Rocephin plus doxycycline and was transferred to  telemetry for close monitoring.  Speech evaluated and that was started as per speech therapy recommendation.  Palliative continue to follow the patient, after next visit discussion with the palliative team with patient's power of , plan is to send patient and his wife to group home on hospice.  Hospice referral has been placed.  At this time his antibiotics were changed to oral antibiotics including Augmentin and doxycycline.    Continue to follow the patient  Patient is noted to be confused, noted to have facial droop, code stroke was called, CT and CTA of the head along with CTA perfusion scan reviewed, neurology consulted, echo reviewed.  --Patient is noted to have elevated WBC of 13, noted to have rales and crackles at the bases, aspiration precaution, lactic acid, blood culture x2 ordered.  Patient was started on  Treatment for aspiration pna with unasyn+Doxy    - speech/PTand OT  Leukocytosis at 13s         Interval Problem Update  Pain-well controlled again today, he denies any new issues.   Dementia-remains confused, in B/L wrist restraints, requires intermittent Geodon. Spoke with psych and will increase seroquel.  Spoke with PC, changed to comfort care as agreed upon earlier.   Low K+ improved.     Psychiatry cannot comment on financial capacity.     Consultants/Specialty  Neurology  Palliative  Psychiatry    Code Status  Comfort Care/DNR    Disposition  TBD, working on [facility] memory care unit with SNF, issues with finances currently    Review of Systems  Limited 2/2 patient mentation      Physical Exam  Temp:  [36.1 °C (96.9 °F)-36.5 °C (97.7 °F)] 36.1 °C (96.9 °F)  Pulse:  [65-85] 82  Resp:  [16-18] 18  BP: (122-154)/(66-97) 148/97  SpO2:  [92 %-96 %] 94 %    Physical Exam  Vitals signs and nursing note reviewed.   Constitutional:       Appearance: Normal appearance.      Comments: Confused, but interactive   HENT:      Head: Normocephalic and atraumatic.   Eyes:      Pupils: Pupils are equal,  round, and reactive to light.   Cardiovascular:      Rate and Rhythm: Normal rate and regular rhythm.      Heart sounds: Normal heart sounds.   Pulmonary:      Effort: Pulmonary effort is normal. No respiratory distress.      Breath sounds: No wheezing.      Comments: Rales heard   Abdominal:      General: Abdomen is flat.      Palpations: Abdomen is soft.      Tenderness: There is no abdominal tenderness.   Musculoskeletal:         General: No swelling.      Comments: B/L wrist restraints   Skin:     General: Skin is warm.   Neurological:      Mental Status: He is alert.      Comments:      Psychiatric:         Mood and Affect: Mood normal.         Fluids  No intake or output data in the 24 hours ending 08/13/20 1131    Laboratory  Recent Labs     08/11/20  0841   WBC 8.8   RBC 4.26*   HEMOGLOBIN 15.7   HEMATOCRIT 45.0   .6*   MCH 36.9*   MCHC 34.9   RDW 49.4   PLATELETCT 184   MPV 11.3     Recent Labs     08/11/20  0841 08/12/20  0836 08/12/20  2353   SODIUM 140 138 139   POTASSIUM 3.2* 2.7* 3.8   CHLORIDE 103 103 106   CO2 23 28 22   GLUCOSE 88 86 120*   BUN 14 10 9   CREATININE 0.50 0.50 0.63   CALCIUM 8.9 8.5 8.4*                   Imaging  DX-CHEST-LIMITED (1 VIEW)   Final Result      1.  Bibasilar interstitial infiltrate.      2.  Cardiomegaly.      CT-CTA NECK WITH & W/O-POST PROCESSING   Final Result      1.  Atherosclerotic plaque involving the left carotid bifurcation which results in less than 50% diameter narrowing.      2.  Atherosclerotic plaque involving the right carotid bifurcation which results in 50% diameter narrowing.      3.  Patent vertebral arteries.      CT-CTA HEAD WITH & W/O-POST PROCESS   Final Result      CT angiogram of the [place] within normal limits.      CT-CEREBRAL PERFUSION ANALYSIS   Final Result      1.  Cerebral blood flow less than 30% likely representing completed infarct = 0 mL.      2.  T Max more than 6 seconds likely representing combination of completed  infarct and ischemia = 0 mL.      3.  Mismatched volume likely representing ischemic brain/penumbra = None      4.  Please note that the cerebral perfusion was performed on the limited brain tissue around the basal ganglia region. Infarct/ischemia outside the CT perfusion sections can be missed in this study.      CT-HEAD W/O   Final Result      1.  No evidence of acute intracranial process.      2.  Cerebral atrophy as well as periventricular chronic small vessel ischemic change.      MR-BRAIN-WITH & W/O   Final Result      1.  No acute abnormality.   2.  Moderate cerebral atrophy.   3.  Moderate chronic microvascular ischemic disease.   4.  There is no evidence of vasogenic edema. There is focal encephalomalacia in the right frontal lobe likely secondary to the previous brain insult.      EC-ECHOCARDIOGRAM COMPLETE W/O CONT   Final Result      CT-CHEST (THORAX) WITH   Final Result         1.  Bilateral peripheral reticular pulmonary opacities, appearance suggests component of fibrosis.   2.  Bilateral dependent atelectasis is seen.   3.  Left clavicular fracture, appears likely subacute.   4.  Atherosclerosis and atherosclerotic coronary artery disease      Fleischner Society pulmonary nodule recommendations:         CT-HEAD W/O   Final Result         1.  Subtle area of low-density in the right parieto-occipital subcortical white matter, could represent vasogenic edema. Recommend further characterization with contrast-enhanced CT head or MRI to exclude intracranial mass lesion.   2.  Nonspecific white matter changes, commonly associated with small vessel ischemic disease.  Associated mild cerebral atrophy is noted.   3.  Atherosclerosis.      DX-SHOULDER 2+ LEFT   Final Result         1.  No acute traumatic bony injury.   2.  Degenerative changes of the acromioclavicular and glenohumeral joints.              Assessment/Plan  Intractable pain- (present on admission)  Assessment & Plan  Secondary to gait disorder  with recurrent falls resulting in left clavicular fracture.  Orthopedic surgery consulted, appreciate their input   -- PT and OT    -- tylenol and toradol  -pain is currently well controlled, pt is now comfort care, continue to adjust PRN, no further adjustments needed for now  -hospice referral has been placed    Clavicular fracture- (present on admission)  Assessment & Plan  Not acute sub-acute clavicular fracture on CT chest    --- discussed with Ortho, appreciate their input    Advance care planning- (present on admission)  Assessment & Plan  -palliative care following  -changed to comfort care, discussed in detail with PC team today  -hospice referral palced    Aspiration pneumonia (HCC)- (present on admission)  Assessment & Plan  Likely secondary to aspiration event, blood cultures x2, pending   --- Was on Unasyn plus doxy, switch to augmentin only, doing well, remains afebrile    Dysphagia- (present on admission)  Assessment & Plan  Patient is noted to have gurgling sound, speech therapy was consulted, diet as per speech therapy recommendation    Hypokalemia- (present on admission)  Assessment & Plan  -improved, NO need for tele    Dementia with behavioral disturbance (HCC)- (present on admission)  Assessment & Plan  Psych consulted and recs depakote 250 mg tid  along with seroquel  -depakote level therapuetic at 56  -increase seroquel to 25 mg BID, no suspicion for stroke like symptoms  -geodon IM PRN, avoid haldol given concern for recent TBI  -remains decently controlled  -psych cannot comment on financial capacity, defer to SW/CM    Vasogenic edema (HCC)- (present on admission)  Assessment & Plan  Suspected vasogenic edema in CT,, MRI brain focal encephalomalacia in the right frontal lobe and noted no edema noted   - neuro-check q 4 hs; no focal deficit     Macrocytic anemia- (present on admission)  Assessment & Plan  Likely 2/2 vitamin b12 deficiency    -- will provide  IM vit B12 followed by po      Vitamin B12 deficiency- (present on admission)  Assessment & Plan  Will provide 1000mcg of vitamin b12   IM provided by po    Fall- (present on admission)  Assessment & Plan   Pt and OT, needs arbors with hospice       VTE prophylaxis: lovenox    ROS

## 2020-08-13 NOTE — DISCHARGE PLANNING
Anticipated Discharge Disposition: [facility] w/ Hospice    Action: Noted in chart that psych is unable to comment on capacity for financial decision making d/t it being out of their scope of practice.  Reached out to [facility] supervisor, by e-mail (cc'd [facility] staff caring for his wife on e-mail as well), requesting guidance as to how to advise their POA to proceed in order to get the documentation she needs in order to be able to sell their home.    Barriers to Discharge: coordination of care.    Plan: RN CM to follow up with supervisor and POA on how to proceed.

## 2020-08-13 NOTE — PROGRESS NOTES
Report received from day shift RN, assumed patient care. Patient is A&O x 3, on room air. Patient is medical. Patient denies any pain at this time. Patient updated on plan of care and verbalizes no questions. Patient has call light within reach, fall precautions in place. Patient educated to call for assistance as needed. Will continue to monitor.

## 2020-08-13 NOTE — PALLIATIVE CARE
Palliative Care follow-up  Saw that care was escalated last night. Pts DPOA wished for pt to be CC and had a referral sent [facility] on 8/11 and pt was accepted to [facility] on 8/11 in the afternoon.     Spoke with Dr Larson who placed pt on CC. Discussed Kisha Walker ([phone], # also on face sheet) pts friend and HC-DPOA's request for assistance with some type of letter to verify the pts inability to make decisions. Let Dr Larson know I would discuss with [facility].     Spoke with [facility] on pts case. [facility] has received an e-mail detailing what the pts  is requesting. Unsure what the hospital can provide, advised to escalate to her Manager or Director in order to have [facility] or [facility]  look at what the hospital can provide to assist with the pts discharge plan.     Updated: Dr Larson, [facility], bedside [facility] Ros[facility]    Plan: Transition to CC while patients discharge with [facility] support is worked on.     Thank you for allowing Palliative Care to support this patient and family. Contact [phone] for additional assistance, change in patient status, or with any questions/concerns.

## 2020-08-13 NOTE — CARE PLAN
Problem: Communication  Goal: The ability to communicate needs accurately and effectively will improve  Outcome: PROGRESSING SLOWER THAN EXPECTED   Note: Patient has hourly rounding in place, patient has call light within reach, fall and safety precautions in place. Will continue to monitor.       Problem: Safety - Medical Restraint  Goal: Remains free of injury from restraints (Restraint for Interference with Medical Device)  Description: INTERVENTIONS:  1. Determine that other, less restrictive measures have been tried or would not be effective before applying the restraint  2. Evaluate the patient's condition at the time of restraint application  3. Inform patient/family regarding the reason for restraint  4. Q2H: Monitor safety, psychosocial status, comfort, nutrition and hydration  Outcome: PROGRESSING AS EXPECTED   Note: Patient has hourly rounding in place and Q2HR restraint monitoring, patient having safety checks, less restrictive measures have been taken, patient has comfort, nutrition, and hydration in place.

## 2020-08-13 NOTE — PSYCHIATRY
This encounter was conducted via Zoom .   Verbal consent was obtained. Patient's identity was verified.    PSYCHIATRIC FOLLOW-UP:(established)  *Reason for admission: ADM on 8/5/20 after ground level fall.        *Legal Hold Status on Admission:            Chart reviewed.         *HPI:  Pt was on 2 point restrains, oriented to person, place, partially to situation. He reported being admitted for an shoulder injury after a fall. He is not able to tell what treatment he has received in the hospital, denied having any surgeries. Pt does not know why he is on restrains. He does not recall episodes of agitation/combative behavior. He denies feeling confused. He denies any psychosis, states he is sleeping and eating well. He does not know why he refused medications in the morning, but was in agreement to take them after eval.       Per nurse, pt is on restrains since yesterday, when he was combative. He slpet overnight and ate 75% meals today.   Pt received PRN Geodon yesterday.   Today, has been calmer.     Seroquel was decreased on 8/9 to 12.5mg BID after possible symptoms for stroke. Discussed the case with Dr Marsha MD, who has no concerns with increasing dose back to 25mg BID.         Medical ROS (as pertinent):     Review of Systems   Constitutional: Negative for malaise/fatigue.   HENT: Negative for sore throat.    Respiratory: Negative for cough and shortness of breath.    Cardiovascular: Negative for chest pain.   Gastrointestinal: Negative for abdominal pain, blood in stool, constipation, nausea and vomiting.   Genitourinary: Negative for dysuria and hematuria.   Musculoskeletal: Positive for joint pain.   Skin: Negative for rash.   Neurological: Negative for tremors, weakness and headaches.   Psychiatric/Behavioral: Negative for depression, hallucinations and suicidal ideas. The patient is not nervous/anxious and does not have insomnia.            *Psychiatric Examination:  Vitals:   Vitals:    08/13/20  0800   BP: 148/97   Pulse: 82   Resp: 18   Temp: 36.1 °C (96.9 °F)   SpO2: 94%       Appearance: appears stated age, fair grooming and hygiene, calm, cooperative, good eye contact, on 2 point restrains.  Abnormal movements: none  Gait/posture: normal  Speech: normal volume, tone and rhythm  Though process: linear  Associations: no loose associations  Thought content: denies AVH, no delusions or paranoia elicited, does not appear to be responding to internal stimuli, neither is internally preoccupied.   Judgement and Insight: limited/limited  Orientation:oriented to person, place, and partially to situations   Recent and Remote Memory: impaired  Attention Span and Concentration: distracted at times  Language: fluid   Fund of Knowledge: not tested   Mood and Affect: constricted but reactive   SI/HI:denies any active or passive SI/HI           *EKG: QTC on 8/9: 456  *Imaging: head CT reviewed 8/9:1.  No evidence of acute intracranial process.     2.  Cerebral atrophy as well as periventricular chronic small vessel ischemic change    DX-CHEST-LIMITED (1 VIEW)   Final Result      1.  Bibasilar interstitial infiltrate.      2.  Cardiomegaly.      CT-CTA NECK WITH & W/O-POST PROCESSING   Final Result      1.  Atherosclerotic plaque involving the left carotid bifurcation which results in less than 50% diameter narrowing.      2.  Atherosclerotic plaque involving the right carotid bifurcation which results in 50% diameter narrowing.      3.  Patent vertebral arteries.      CT-CTA HEAD WITH & W/O-POST PROCESS   Final Result      CT angiogram of the [place] of [place] within normal limits.      CT-CEREBRAL PERFUSION ANALYSIS   Final Result      1.  Cerebral blood flow less than 30% likely representing completed infarct = 0 mL.      2.  T Max more than 6 seconds likely representing combination of completed infarct and ischemia = 0 mL.      3.  Mismatched volume likely representing ischemic brain/penumbra = None      4.  Please  note that the cerebral perfusion was performed on the limited brain tissue around the basal ganglia region. Infarct/ischemia outside the CT perfusion sections can be missed in this study.      CT-HEAD W/O   Final Result      1.  No evidence of acute intracranial process.      2.  Cerebral atrophy as well as periventricular chronic small vessel ischemic change.      MR-BRAIN-WITH & W/O   Final Result      1.  No acute abnormality.   2.  Moderate cerebral atrophy.   3.  Moderate chronic microvascular ischemic disease.   4.  There is no evidence of vasogenic edema. There is focal encephalomalacia in the right frontal lobe likely secondary to the previous brain insult.      EC-ECHOCARDIOGRAM COMPLETE W/O CONT   Final Result      CT-CHEST (THORAX) WITH   Final Result         1.  Bilateral peripheral reticular pulmonary opacities, appearance suggests component of fibrosis.   2.  Bilateral dependent atelectasis is seen.   3.  Left clavicular fracture, appears likely subacute.   4.  Atherosclerosis and atherosclerotic coronary artery disease      Fleischner Society pulmonary nodule recommendations:         CT-HEAD W/O   Final Result         1.  Subtle area of low-density in the right parieto-occipital subcortical white matter, could represent vasogenic edema. Recommend further characterization with contrast-enhanced CT head or MRI to exclude intracranial mass lesion.   2.  Nonspecific white matter changes, commonly associated with small vessel ischemic disease.  Associated mild cerebral atrophy is noted.   3.  Atherosclerosis.      DX-SHOULDER 2+ LEFT   Final Result         1.  No acute traumatic bony injury.   2.  Degenerative changes of the acromioclavicular and glenohumeral joints.              EEG:  n/a      Assessment: Continues to have intermittent agitation and confusion.       Dx:  Delirium with behavioral disturbances.   Dementia     Medical (specify new and established dx):  Intractable pain  Clavicular  fracture  encephalomacia  Dementia with behavioral disturbances  Vasogenic edema  Hyponatremia  Macrocytic anemia  Vit B 12 def  Fall        Plan:  1- Legal hold:n/a  2- Psychotropic medications:  Continue depakote to 250mg Po TID for agitation. Depakote level 56.8  Increase seroquel to 25mg Po BID for agitation (started on 8/5). Increaseby  25mg every 2-3 days if patient continue to be agitated, as tolerated. Monitor for SE such as EPS and orthostatic hypotension which can increase risk for falls.    3- PRNS for agitation: Geodon 10mg IM Q6H PRN, max dose daily is 40mg  Avoid Haldol with this patient due to brain insult.  Avoid anticholinergics, histaminics and benzos.   4- Case discussed with Dr Marsha MD  5- Psychiatry will follow up.      Thank you for the consult.

## 2020-08-13 NOTE — THERAPY
Missed Therapy     Patient Name: Burt Curtis  Age:  88 y.o., Sex:  male  Medical Record #: [id number]  Today's Date: 8/13/2020    Discussed missed therapy with RN       08/13/20 1039   Treatment Variance   Reason For Missed Therapy Medical - Other (Please Comment)  (Pt transitioned to comfort care)   Total Time Spent   Total Time Spent (Mins) 3   Interdisciplinary Plan of Care Collaboration   IDT Collaboration with  Nursing   Collaboration Comments Per chart review, Pt has been transitioned to comfort care measures. No further inpatient skilled SLP services appear warranted at this time; SLP to sign off. Thank you.

## 2020-08-13 NOTE — PSYCHIATRY
I attempted to follow up with patient today 3 times, however his nurse was not able to locate the unit IPAD for a teleconsult. Will attempt tomorrow again.

## 2020-08-13 NOTE — PROGRESS NOTES
Monitor Summary     Patient was medical     Patient was placed on tele for possible IV K+ infusion @ 2136     SR [id number]  (R) PVC, PAC   .18/.08/.32

## 2020-08-13 NOTE — PROGRESS NOTES
Bedside report received from NOC RN. Assumed care of pt. Pt awake, laying in bed. A/Ox1, VSS. No concerns, complaints or distress. Pt educated to call before getting out of bed. POC reviewed and white board updated. Tele box on. SR 77 on the monitor. Pt is in 2 point bilateral wrist restrains without any sign of injury. Call light in reach. Bed locked in lowest position with 2 upper bed rails up. Bed alarm on.

## 2020-08-13 NOTE — PROGRESS NOTES
Unable to notify patients family of patients restraint placement. Wife in hospital at another facility and patient does not have any other informants listed in his emergency contatcs. Per case management, patient has POA. This RN unable to locate POA phone number in chart to notify of restraint placement.

## 2020-08-13 NOTE — PROGRESS NOTES
Day shift RN unable to given patients IV potassium because patient removed IV access. AYAKA Campbell notified, this RN to recheck K+ level prior to administering IV K+. K+ came back at 3.8. AYAKA notified, recheck patients K+ at 8 am. Will continue to monitor.
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
## 2020-08-14 PROBLEM — E87.6 HYPOKALEMIA: Status: RESOLVED | Noted: 2020-01-01 | Resolved: 2020-01-01

## 2020-08-14 NOTE — PROGRESS NOTES
Assumed care of pt.  Pt on comfort care measures.  AAOx3 this morning, disoriented to time.   Denies pain or discomfort at this time.  Resting comfortably in room.  Bed alarm on, call light within reach.   Hourly rounding in place.

## 2020-08-14 NOTE — PROGRESS NOTES
[facility] Medicine Daily Progress Note    Date of Service  8/14/2020    Chief Complaint  88 y.o. male admitted 8/3/2020 with   Chief Complaint   Patient presents with   • GLF     pt had GLF 1 week ago unknown LOC; c/o L sided chest wall pain and L arm and shoulder pain; blood glucose 170 per EMS         Hospital Course  This is a 88-year-old male with past medical history significant for hyperlipidemia, hypertension, dementia  presented to ER on 8/4/2020 after he was noted to  Have left sided shoulder pain and chest wall pain.  It is noted that patient had a ground-level fall 1 week ago, complaint of left-sided chest wall pain, left arm and shoulder pain.  He reports having said a ground-level fall, denied any dizziness.     Upon presentation in ER, he is noted to have macrocytosis with MCV of 106 , mildly elevated troponin at 38.  CT chest abdomen showed left clavicular fracture appears likely subacute.  ER.  Discussed with orthopedic surgery Dr Shields, appreciated his input.     CT head showed subtle area of low density in the right parieto-occipital subcortical white matter could represent vasogenic edema, MRI of brain  Showed encephalomalacia. Patient didn't have any neurological deficit. Considering macrocytosis vitamin B12 was obtained noted to be low, will provide vitamin B12 thousand MCG p.o. daily; mildly elevated TSH at 7s , will start synthroid 25 mcg po qd.     He is levated troponin at 38-->28-->22 ; EKG  Didn't show any ST and T wave changes; Echo showed  EF &0% and Moderate AS.  On 8/9/2020, patient noted to be confused agitated with facial droop, CT/CTA chest along with CT perfusion scan--> no acute ischemic CVA noted, reviewed records demonstrate that likely etiology is infectious.  Patient is noted to have elevated WBC at 13, noted to have gurgling sound chest x-ray consistent with pneumonia, patient was started on aspiration precaution, started on Rocephin plus doxycycline and was transferred to  telemetry for close monitoring.  Speech evaluated and that was started as per speech therapy recommendation.  Palliative continue to follow the patient, after next visit discussion with the palliative team with patient's power of , plan is to send patient and his wife to group home on hospice.  Hospice referral has been placed.  At this time his antibiotics were changed to oral antibiotics including Augmentin and doxycycline.    Continue to follow the patient  Patient is noted to be confused, noted to have facial droop, code stroke was called, CT and CTA of the head along with CTA perfusion scan reviewed, neurology consulted, echo reviewed.  --Patient is noted to have elevated WBC of 13, noted to have rales and crackles at the bases, aspiration precaution, lactic acid, blood culture x2 ordered.  Patient was started on  Treatment for aspiration pna with unasyn+Doxy    - speech/PTand OT  Leukocytosis at 13s    Interval Problem Update  Awake, laying in bed. No distress. Eager to go home ASAP. No overnight events. Transferred from telemetry to CNU for comfort care. Wife is also admitted and on this unit.     Consultants/Specialty  Neurology  Palliative  Psychiatry    Code Status  Comfort Care/DNR    Disposition  TBPAUL, working on [facility] memory care unit with SNF, issues with finances currently    Review of Systems  Limited 2/2 patient mentation.  Denies chest pain or SOB.     Physical Exam  Temp:  [36.1 °C (96.9 °F)] 36.1 °C (96.9 °F)  Pulse:  [82] 82  Resp:  [18] 18  BP: (148)/(97) 148/97  SpO2:  [94 %] 94 %    Physical Exam  Vitals signs reviewed.   Constitutional:       General: He is not in acute distress.     Appearance: Normal appearance. He is not diaphoretic.      Comments: Confused, but interactive   HENT:      Head: Normocephalic and atraumatic.   Eyes:      General:         Right eye: No discharge.         Left eye: No discharge.      Extraocular Movements: Extraocular movements intact.   Neck:       Musculoskeletal: Normal range of motion. No neck rigidity.   Cardiovascular:      Rate and Rhythm: Normal rate and regular rhythm.      Heart sounds: Normal heart sounds.   Pulmonary:      Effort: Pulmonary effort is normal. No respiratory distress.      Breath sounds: Rales present. No wheezing.   Abdominal:      General: Abdomen is flat.      Palpations: Abdomen is soft.      Tenderness: There is no abdominal tenderness.   Musculoskeletal:         General: No swelling or tenderness.      Comments: B/L wrist restraints   Skin:     General: Skin is warm.   Neurological:      General: No focal deficit present.      Mental Status: He is alert. He is disoriented.   Psychiatric:         Mood and Affect: Mood normal.         Behavior: Behavior is cooperative.         Fluids    Intake/Output Summary (Last 24 hours) at 8/14/2020 0719  Last data filed at 8/13/2020 2230  Gross per 24 hour   Intake 120 ml   Output 100 ml   Net 20 ml       Laboratory  Recent Labs     08/11/20  0841   WBC 8.8   RBC 4.26*   HEMOGLOBIN 15.7   HEMATOCRIT 45.0   .6*   MCH 36.9*   MCHC 34.9   RDW 49.4   PLATELETCT 184   MPV 11.3     Recent Labs     08/11/20  0841 08/12/20  0836 08/12/20  2353   SODIUM 140 138 139   POTASSIUM 3.2* 2.7* 3.8   CHLORIDE 103 103 106   CO2 23 28 22   GLUCOSE 88 86 120*   BUN 14 10 9   CREATININE 0.50 0.50 0.63   CALCIUM 8.9 8.5 8.4*                   Imaging  DX-CHEST-LIMITED (1 VIEW)   Final Result      1.  Bibasilar interstitial infiltrate.      2.  Cardiomegaly.      CT-CTA NECK WITH & W/O-POST PROCESSING   Final Result      1.  Atherosclerotic plaque involving the left carotid bifurcation which results in less than 50% diameter narrowing.      2.  Atherosclerotic plaque involving the right carotid bifurcation which results in 50% diameter narrowing.      3.  Patent vertebral arteries.      CT-CTA HEAD WITH & W/O-POST PROCESS   Final Result      CT angiogram of the [place] within normal limits.       CT-CEREBRAL PERFUSION ANALYSIS   Final Result      1.  Cerebral blood flow less than 30% likely representing completed infarct = 0 mL.      2.  T Max more than 6 seconds likely representing combination of completed infarct and ischemia = 0 mL.      3.  Mismatched volume likely representing ischemic brain/penumbra = None      4.  Please note that the cerebral perfusion was performed on the limited brain tissue around the basal ganglia region. Infarct/ischemia outside the CT perfusion sections can be missed in this study.      CT-HEAD W/O   Final Result      1.  No evidence of acute intracranial process.      2.  Cerebral atrophy as well as periventricular chronic small vessel ischemic change.      MR-BRAIN-WITH & W/O   Final Result      1.  No acute abnormality.   2.  Moderate cerebral atrophy.   3.  Moderate chronic microvascular ischemic disease.   4.  There is no evidence of vasogenic edema. There is focal encephalomalacia in the right frontal lobe likely secondary to the previous brain insult.      EC-ECHOCARDIOGRAM COMPLETE W/O CONT   Final Result      CT-CHEST (THORAX) WITH   Final Result         1.  Bilateral peripheral reticular pulmonary opacities, appearance suggests component of fibrosis.   2.  Bilateral dependent atelectasis is seen.   3.  Left clavicular fracture, appears likely subacute.   4.  Atherosclerosis and atherosclerotic coronary artery disease      Fleischner Society pulmonary nodule recommendations:         CT-HEAD W/O   Final Result         1.  Subtle area of low-density in the right parieto-occipital subcortical white matter, could represent vasogenic edema. Recommend further characterization with contrast-enhanced CT head or MRI to exclude intracranial mass lesion.   2.  Nonspecific white matter changes, commonly associated with small vessel ischemic disease.  Associated mild cerebral atrophy is noted.   3.  Atherosclerosis.      DX-SHOULDER 2+ LEFT   Final Result         1.  No acute  traumatic bony injury.   2.  Degenerative changes of the acromioclavicular and glenohumeral joints.              Assessment/Plan  Intractable pain- (present on admission)  Assessment & Plan  Secondary to gait disorder with recurrent falls resulting in left clavicular fracture.  Orthopedic surgery consulted, appreciate their input  - supportive care  - palliative care following, continue with comfort care measures  - hospice referral placed    Dementia with behavioral disturbance (HCC)- (present on admission)  Assessment & Plan  Unable to care for himself.   - Psych consulted and recs depakote 250 mg tid along with seroquel  - Depakote level therapuetic at 56  - psychiatry following, appreciate recs  - titrate medication as need for agitation and impulsivity    Clavicular fracture- (present on admission)  Assessment & Plan  Subacute clavicular fracture on CT chest   - discussed with Ortho, non operative    Advance care planning- (present on admission)  Assessment & Plan  - Palliative care following  - continue comfort care measures    Aspiration pneumonia (HCC)- (present on admission)  Assessment & Plan  Likely secondary to aspiration event  - blood cultures: NGTD  - s/p abx    Dysphagia- (present on admission)  Assessment & Plan  Patient is noted to have gurgling sound when eating/drinking.   - comfort care  - accepting risk and can eat as tolerated    Vasogenic edema (HCC)- (present on admission)  Assessment & Plan  Suspected vasogenic edema in CT, MRI with focal encephalomalacia in the right frontal lobe and noted no edema noted    Macrocytic anemia- (present on admission)  Assessment & Plan  Likely 2/2 vitamin b12 deficiency    -- will provide  IM vit B12 followed by po     Vitamin B12 deficiency- (present on admission)  Assessment & Plan  B12 improved after repletion.       VTE prophylaxis: lovenox

## 2020-08-14 NOTE — CARE PLAN
Problem: Communication  Goal: The ability to communicate needs accurately and effectively will improve  Outcome: PROGRESSING AS EXPECTED  POC reviewed with pt. All questions answered at this time.      Problem: Safety  Goal: Will remain free from injury  Outcome: PROGRESSING AS EXPECTED  Pt rated a high fall risk per [facility] fall assessment tool. All appropriate interventions in place. Bed alarm on.

## 2020-08-14 NOTE — PROGRESS NOTES
Tele sitter device placed in room to monitor patient due to increasing restlessness and pt jumping out of bed.

## 2020-08-14 NOTE — CARE PLAN
Problem: Safety  Goal: Will remain free from falls  Outcome: PROGRESSING AS EXPECTED  Intervention: Assess risk factors for falls  Note: Patient has hourly rounding in place, bed alarm in use, patient has call light within reach, fall and safety precautions in place. Will continue to monitor.         Problem: Communication  Goal: The ability to communicate needs accurately and effectively will improve  Outcome: PROGRESSING SLOWER THAN EXPECTED  Intervention: [place] patient and significant other/support system to call light to alert staff of needs  Note: Patient has hourly rounding in place, patient has call light within reach, fall and safety precautions in place. Will continue to monitor.

## 2020-08-14 NOTE — THERAPY
Physical Therapy Contact Note    Pt now with comfort care status.  Patient will not be actively followed for physical therapy services at this time, however may be seen if requested by physician for 1 more visit within 30 days to address any discharge or equipment needs.    Latoya Pruitt, PT, DPT  Ext. [phone]

## 2020-08-14 NOTE — DISCHARGE PLANNING
Anticipated Discharge Disposition: longterm w/ [facility]    Action: Had multiple conversations w/ NINA Beard, today.  She stated that after speaking with her  again, she simply needs a letter from 2 MDs stating that the patient and his wife have dementia so that she can proceed with selling their home in order to pay for their stay at the assisted living facilty.      Cheryl, [facility] supervisor, and Krysta hospitalist RN, to assist with obtaining said letters from MDs.      E-mailed a copy of the Release of Information to NINA Beard, @ [email or web address]. Asked her to complete the form and return to CNU staff when she comes to visit the patient's wife today.  Explained that we may not have the letters for her today, but requested she please return the release so we would have it on file.  She verbalized understanding and was very thankful for our efforts to assist her in caring for the patient and his wife.    Updated CNU HALEY, Niki, as well as Cheryl on where we are in the process of obtaining the letters via e-mail.    Barriers to Discharge: waiting on longterm placement, has been accepted by hospice    Plan: RN CM to follow up with healthcare team tomorrow for completion of letters for POA.

## 2020-08-15 NOTE — PROGRESS NOTES
Received report from AYE Noonan. Assumed care of pt [phone]. Pt resting comfortably. TeleSitter in place. Communication board updated. Bed alarm in place.

## 2020-08-15 NOTE — CARE PLAN
Problem: Safety  Goal: Will remain free from falls  Outcome: PROGRESSING AS EXPECTED  Intervention: Assess risk factors for falls  Note: Pt a high fall risk. All appropriate interventions in place. Bed alarm on. Tele-sitter in use due to patient's impulsivity     Problem: Safety - Medical Restraint  Goal: Remains free of injury from restraints (Restraint for Interference with Medical Device)  Description: INTERVENTIONS:  1. Determine that other, less restrictive measures have been tried or would not be effective before applying the restraint  2. Evaluate the patient's condition at the time of restraint application  3. Inform patient/family regarding the reason for restraint  4. Q2H: Monitor safety, psychosocial status, comfort, nutrition and hydration  Outcome: MET  Goal: Free from restraint(s) (Restraint for Interference with Medical Device)  Description: INTERVENTIONS:  1. ONCE/SHIFT or MINIMUM Q12H: Assess and document the continuing need for restraints  2. Q24H: Continued use of restraint requires LIP to perform face to face examination and written order  3. Identify and implement measures to help patient regain control  Outcome: MET  Note: Patient is no longer in restraints

## 2020-08-15 NOTE — CARE PLAN
Problem: Safety  Goal: Will remain free from injury  Outcome: PROGRESSING AS EXPECTED   All fall precautions in place, bed alarm in place, hourly rounding complete, call light and personal belongings kept within reach at all times. Education done with pt on importance of calling before getting OOB, no learning evident. TeleSitter remains in place for safety Will continue to monitor.      Problem: Pain Management  Goal: Pain level will decrease to patient's comfort goal  Outcome: PROGRESSING AS EXPECTED   No s/s of pain at this time

## 2020-08-15 NOTE — PROGRESS NOTES
[facility] Medicine Daily Progress Note    Date of Service  8/15/2020    Chief Complaint  88 y.o. male admitted 8/3/2020 with   Chief Complaint   Patient presents with   • GLF     pt had GLF 1 week ago unknown LOC; c/o L sided chest wall pain and L arm and shoulder pain; blood glucose 170 per EMS         Hospital Course  This is a 88-year-old male with past medical history significant for hyperlipidemia, hypertension, dementia  presented to ER on 8/4/2020 after he was noted to  Have left sided shoulder pain and chest wall pain.  It is noted that patient had a ground-level fall 1 week ago, complaint of left-sided chest wall pain, left arm and shoulder pain.  He reports having said a ground-level fall, denied any dizziness.     Upon presentation in ER, he is noted to have macrocytosis with MCV of 106 , mildly elevated troponin at 38.  CT chest abdomen showed left clavicular fracture appears likely subacute.  ER.  Discussed with orthopedic surgery Dr Shields, appreciated his input.     CT head showed subtle area of low density in the right parieto-occipital subcortical white matter could represent vasogenic edema, MRI of brain  Showed encephalomalacia. Patient didn't have any neurological deficit. Considering macrocytosis vitamin B12 was obtained noted to be low, will provide vitamin B12 thousand MCG p.o. daily; mildly elevated TSH at 7s , will start synthroid 25 mcg po qd.     He is levated troponin at 38-->28-->22 ; EKG  Didn't show any ST and T wave changes; Echo showed  EF &0% and Moderate AS.  On 8/9/2020, patient noted to be confused agitated with facial droop, CT/CTA chest along with CT perfusion scan--> no acute ischemic CVA noted, reviewed records demonstrate that likely etiology is infectious.  Patient is noted to have elevated WBC at 13, noted to have gurgling sound chest x-ray consistent with pneumonia, patient was started on aspiration precaution, started on Rocephin plus doxycycline and was transferred to  telemetry for close monitoring.  Speech evaluated and that was started as per speech therapy recommendation.  Palliative continue to follow the patient, after next visit discussion with the palliative team with patient's power of , plan is to send patient and his wife to group home on hospice.  Hospice referral has been placed.  At this time his antibiotics were changed to oral antibiotics including Augmentin and doxycycline.    Interval Problem Update  Sleeping, no acute distress. Becomes confused and adamant about going home with his wife today.     Consultants/Specialty  Neurology  Palliative  Psychiatry    Code Status  Comfort Care/DNR    Disposition  TBD, working on [facility] memory care unit with SNF, issues with finances currently    Review of Systems  Limited 2/2 patient mentation.  Denies chest pain or SOB.     Physical Exam       Physical Exam  Vitals signs reviewed.   Constitutional:       General: He is not in acute distress.     Appearance: Normal appearance. He is not diaphoretic.      Comments: Confused, but interactive   HENT:      Head: Normocephalic.   Eyes:      General:         Right eye: No discharge.         Left eye: No discharge.      Extraocular Movements: Extraocular movements intact.   Neck:      Musculoskeletal: Normal range of motion. No neck rigidity.   Cardiovascular:      Rate and Rhythm: Normal rate and regular rhythm.      Heart sounds: Normal heart sounds.   Pulmonary:      Effort: Pulmonary effort is normal. No respiratory distress.      Breath sounds: Rales present. No wheezing.   Abdominal:      General: Abdomen is flat.      Palpations: Abdomen is soft.      Tenderness: There is no abdominal tenderness.   Musculoskeletal:         General: No swelling or tenderness.      Comments: B/L wrist restraints   Skin:     General: Skin is warm.   Neurological:      General: No focal deficit present.      Mental Status: He is alert. He is disoriented.   Psychiatric:         Mood and  Affect: Mood normal.         Behavior: Behavior is agitated. Behavior is not aggressive. Behavior is cooperative.         Fluids    Intake/Output Summary (Last 24 hours) at 8/15/2020 0722  Last data filed at 8/14/2020 1100  Gross per 24 hour   Intake 120 ml   Output --   Net 120 ml       Laboratory      Recent Labs     08/12/20  0836 08/12/20  2353   SODIUM 138 139   POTASSIUM 2.7* 3.8   CHLORIDE 103 106   CO2 28 22   GLUCOSE 86 120*   BUN 10 9   CREATININE 0.50 0.63   CALCIUM 8.5 8.4*                   Imaging  DX-CHEST-LIMITED (1 VIEW)   Final Result      1.  Bibasilar interstitial infiltrate.      2.  Cardiomegaly.      CT-CTA NECK WITH & W/O-POST PROCESSING   Final Result      1.  Atherosclerotic plaque involving the left carotid bifurcation which results in less than 50% diameter narrowing.      2.  Atherosclerotic plaque involving the right carotid bifurcation which results in 50% diameter narrowing.      3.  Patent vertebral arteries.      CT-CTA HEAD WITH & W/O-POST PROCESS   Final Result      CT angiogram of the [place] within normal limits.      CT-CEREBRAL PERFUSION ANALYSIS   Final Result      1.  Cerebral blood flow less than 30% likely representing completed infarct = 0 mL.      2.  T Max more than 6 seconds likely representing combination of completed infarct and ischemia = 0 mL.      3.  Mismatched volume likely representing ischemic brain/penumbra = None      4.  Please note that the cerebral perfusion was performed on the limited brain tissue around the basal ganglia region. Infarct/ischemia outside the CT perfusion sections can be missed in this study.      CT-HEAD W/O   Final Result      1.  No evidence of acute intracranial process.      2.  Cerebral atrophy as well as periventricular chronic small vessel ischemic change.      MR-BRAIN-WITH & W/O   Final Result      1.  No acute abnormality.   2.  Moderate cerebral atrophy.   3.  Moderate chronic microvascular ischemic disease.   4.   There is no evidence of vasogenic edema. There is focal encephalomalacia in the right frontal lobe likely secondary to the previous brain insult.      EC-ECHOCARDIOGRAM COMPLETE W/O CONT   Final Result      CT-CHEST (THORAX) WITH   Final Result         1.  Bilateral peripheral reticular pulmonary opacities, appearance suggests component of fibrosis.   2.  Bilateral dependent atelectasis is seen.   3.  Left clavicular fracture, appears likely subacute.   4.  Atherosclerosis and atherosclerotic coronary artery disease      Fleischner Society pulmonary nodule recommendations:         CT-HEAD W/O   Final Result         1.  Subtle area of low-density in the right parieto-occipital subcortical white matter, could represent vasogenic edema. Recommend further characterization with contrast-enhanced CT head or MRI to exclude intracranial mass lesion.   2.  Nonspecific white matter changes, commonly associated with small vessel ischemic disease.  Associated mild cerebral atrophy is noted.   3.  Atherosclerosis.      DX-SHOULDER 2+ LEFT   Final Result         1.  No acute traumatic bony injury.   2.  Degenerative changes of the acromioclavicular and glenohumeral joints.              Assessment/Plan  Intractable pain- (present on admission)  Assessment & Plan  Secondary to gait disorder with recurrent falls resulting in left clavicular fracture.    - case was discussed with Orthopedic surgery  - supportive care  - palliative care following, continue with comfort care measures  - hospice referral placed    Dementia with behavioral disturbance (HCC)- (present on admission)  Assessment & Plan  Unable to care for himself.   - psych consulted and recs depakote 250 mg tid along with seroquel  - Depakote level therapuetic at 56  - psychiatry following, appreciate recs  - titrate medication as need for agitation and impulsivity    Clavicular fracture- (present on admission)  Assessment & Plan  Subacute clavicular fracture on CT chest    - discussed with Ortho, non operative    Aspiration pneumonia (HCC)- (present on admission)  Assessment & Plan  Likely secondary to aspiration event  - blood cultures: NGTD  - s/p abx    Dysphagia- (present on admission)  Assessment & Plan  Patient is noted to have gurgling sound when eating/drinking.   - comfort care  - accepting risk and can eat as tolerated    Macrocytic anemia- (present on admission)  Assessment & Plan  Likely 2/2 vitamin b12 deficiency    -- will provide  IM vit B12 followed by po     Vitamin B12 deficiency- (present on admission)  Assessment & Plan  B12 improved after repletion.    Advance care planning- (present on admission)  Assessment & Plan  - Palliative care following  - continue comfort care measures    Vasogenic edema (HCC)- (present on admission)  Assessment & Plan  Suspected vasogenic edema in CT, MRI with focal encephalomalacia in the right frontal lobe and noted no edema noted       VTE prophylaxis: lovenox

## 2020-08-16 NOTE — PROGRESS NOTES
Received report from AYE Nuñez. Assumed care of pt [phone]. Pt resting comfortably. TeleSitter in place. Communication board updated. Bed alarm in place.

## 2020-08-16 NOTE — PROGRESS NOTES
[facility] Medicine Daily Progress Note    Date of Service  8/16/2020    Chief Complaint  88 y.o. male admitted 8/3/2020 with   Chief Complaint   Patient presents with   • GLF     pt had GLF 1 week ago unknown LOC; c/o L sided chest wall pain and L arm and shoulder pain; blood glucose 170 per EMS         Hospital Course  This is a 88-year-old male with past medical history significant for hyperlipidemia, hypertension, dementia  presented to ER on 8/4/2020 after he was noted to  Have left sided shoulder pain and chest wall pain.  It is noted that patient had a ground-level fall 1 week ago, complaint of left-sided chest wall pain, left arm and shoulder pain.  He reports having said a ground-level fall, denied any dizziness.     Upon presentation in ER, he is noted to have macrocytosis with MCV of 106 , mildly elevated troponin at 38.  CT chest abdomen showed left clavicular fracture appears likely subacute.  ER.  Discussed with orthopedic surgery Dr Shields, appreciated his input.     CT head showed subtle area of low density in the right parieto-occipital subcortical white matter could represent vasogenic edema, MRI of brain  Showed encephalomalacia. Patient didn't have any neurological deficit. Considering macrocytosis vitamin B12 was obtained noted to be low, will provide vitamin B12 thousand MCG p.o. daily; mildly elevated TSH at 7s , will start synthroid 25 mcg po qd.     He is levated troponin at 38-->28-->22 ; EKG  Didn't show any ST and T wave changes; Echo showed  EF &0% and Moderate AS.  On 8/9/2020, patient noted to be confused agitated with facial droop, CT/CTA chest along with CT perfusion scan--> no acute ischemic CVA noted, reviewed records demonstrate that likely etiology is infectious.  Patient is noted to have elevated WBC at 13, noted to have gurgling sound chest x-ray consistent with pneumonia, patient was started on aspiration precaution, started on Rocephin plus doxycycline and was transferred to  telemetry for close monitoring.  Speech evaluated and that was started as per speech therapy recommendation.  Palliative continue to follow the patient, after next visit discussion with the palliative team with patient's power of , plan is to send patient and his wife to group home on hospice.  Hospice referral has been placed.  At this time his antibiotics were changed to oral antibiotics including Augmentin and doxycycline.    Interval Problem Update  Sleeping, no acute distress. He is no longer in restraints, but tele sitter is in place. He has stool on his hand, I clean it off.  He visited with his wife yesterday.     Consultants/Specialty  Neurology  Palliative  Psychiatry    Code Status  Comfort Care/DNR    Disposition  TBD, working on issues with finances currently. POA and SW are involved.     Review of Systems  Limited 2/2 patient mentation.  Denies chest pain or SOB.     Physical Exam  Temp:  [36.7 °C (98.1 °F)-36.9 °C (98.5 °F)] 36.7 °C (98.1 °F)  Pulse:  [93-94] 94  Resp:  [16-20] 16  BP: (116-152)/(74-81) 116/74  SpO2:  [93 %-96 %] 96 %    Physical Exam  Vitals signs reviewed.   Constitutional:       General: He is sleeping. He is not in acute distress.     Appearance: Normal appearance. He is not diaphoretic.      Comments: Confused, but interactive   HENT:      Head: Normocephalic.   Eyes:      General:         Right eye: No discharge.         Left eye: No discharge.      Extraocular Movements: Extraocular movements intact.   Neck:      Musculoskeletal: Normal range of motion. No neck rigidity.   Cardiovascular:      Rate and Rhythm: Normal rate and regular rhythm.      Heart sounds: Normal heart sounds.   Pulmonary:      Effort: Pulmonary effort is normal. No respiratory distress.      Breath sounds: Rales present. No wheezing.   Abdominal:      General: Abdomen is flat.      Palpations: Abdomen is soft.      Tenderness: There is no abdominal tenderness.   Musculoskeletal:         General: No  swelling or tenderness.   Skin:     General: Skin is warm.   Neurological:      General: No focal deficit present.      Mental Status: He is easily aroused. He is disoriented.   Psychiatric:         Mood and Affect: Mood normal.         Speech: Speech is delayed.         Behavior: Behavior is slowed. Behavior is not aggressive. Behavior is cooperative.         Fluids    Intake/Output Summary (Last 24 hours) at 8/16/2020 0716  Last data filed at 8/15/2020 1900  Gross per 24 hour   Intake --   Output 700 ml   Net -700 ml       Laboratory                        Imaging  DX-CHEST-LIMITED (1 VIEW)   Final Result      1.  Bibasilar interstitial infiltrate.      2.  Cardiomegaly.      CT-CTA NECK WITH & W/O-POST PROCESSING   Final Result      1.  Atherosclerotic plaque involving the left carotid bifurcation which results in less than 50% diameter narrowing.      2.  Atherosclerotic plaque involving the right carotid bifurcation which results in 50% diameter narrowing.      3.  Patent vertebral arteries.      CT-CTA HEAD WITH & W/O-POST PROCESS   Final Result      CT angiogram of the [place] within normal limits.      CT-CEREBRAL PERFUSION ANALYSIS   Final Result      1.  Cerebral blood flow less than 30% likely representing completed infarct = 0 mL.      2.  T Max more than 6 seconds likely representing combination of completed infarct and ischemia = 0 mL.      3.  Mismatched volume likely representing ischemic brain/penumbra = None      4.  Please note that the cerebral perfusion was performed on the limited brain tissue around the basal ganglia region. Infarct/ischemia outside the CT perfusion sections can be missed in this study.      CT-HEAD W/O   Final Result      1.  No evidence of acute intracranial process.      2.  Cerebral atrophy as well as periventricular chronic small vessel ischemic change.      MR-BRAIN-WITH & W/O   Final Result      1.  No acute abnormality.   2.  Moderate cerebral atrophy.   3.   Moderate chronic microvascular ischemic disease.   4.  There is no evidence of vasogenic edema. There is focal encephalomalacia in the right frontal lobe likely secondary to the previous brain insult.      EC-ECHOCARDIOGRAM COMPLETE W/O CONT   Final Result      CT-CHEST (THORAX) WITH   Final Result         1.  Bilateral peripheral reticular pulmonary opacities, appearance suggests component of fibrosis.   2.  Bilateral dependent atelectasis is seen.   3.  Left clavicular fracture, appears likely subacute.   4.  Atherosclerosis and atherosclerotic coronary artery disease      Fleischner Society pulmonary nodule recommendations:         CT-HEAD W/O   Final Result         1.  Subtle area of low-density in the right parieto-occipital subcortical white matter, could represent vasogenic edema. Recommend further characterization with contrast-enhanced CT head or MRI to exclude intracranial mass lesion.   2.  Nonspecific white matter changes, commonly associated with small vessel ischemic disease.  Associated mild cerebral atrophy is noted.   3.  Atherosclerosis.      DX-SHOULDER 2+ LEFT   Final Result         1.  No acute traumatic bony injury.   2.  Degenerative changes of the acromioclavicular and glenohumeral joints.              Assessment/Plan  Intractable pain- (present on admission)  Assessment & Plan  Secondary to gait disorder with recurrent falls resulting in left clavicular fracture.   - case was discussed with Orthopedic surgery  - supportive care  - palliative care following, continue with comfort care measures  - hospice referral placed    Dementia with behavioral disturbance (HCC)- (present on admission)  Assessment & Plan  Unable to care for himself.   - psych consulted and recs depakote 250 mg tid along with seroquel  - Depakote level therapuetic at 56  - psychiatry following, appreciate recs  - titrate medication as need for agitation and impulsivity    Clavicular fracture- (present on  admission)  Assessment & Plan  Subacute clavicular fracture on CT chest.  - discussed with Ortho, non operative    Aspiration pneumonia (HCC)- (present on admission)  Assessment & Plan  Likely secondary to aspiration event  - blood cultures: NGTD  - s/p abx    Dysphagia- (present on admission)  Assessment & Plan  Patient is noted to have gurgling sound when eating/drinking.   - comfort care  - accepting risk and can eat as tolerated    Macrocytic anemia- (present on admission)  Assessment & Plan  2/2 vitamin b12 deficiency   -s/p IM injection    Vitamin B12 deficiency- (present on admission)  Assessment & Plan  B12 improved after repletion.    Advance care planning- (present on admission)  Assessment & Plan  - Palliative care following  - continue comfort care measures    Vasogenic edema (HCC)- (present on admission)  Assessment & Plan  Suspected vasogenic edema in CT, MRI with focal encephalomalacia in the right frontal lobe and noted no edema noted       VTE prophylaxis: lovenox

## 2020-08-16 NOTE — CARE PLAN
Problem: Safety  Goal: Will remain free from injury  Outcome: PROGRESSING AS EXPECTED   All fall precautions in place, bed alarm in place, hourly rounding complete, call light and personal belongings kept within reach at all times. Education done with pt on importance of calling before getting OOB, no learning evident. Pt in room close to nurse's station. TeleSitter in place. Will continue to monitor.      Problem: Discharge Barriers/Planning  Goal: Patient's continuum of care needs will be met  Outcome: PROGRESSING AS EXPECTED   SW working on safe d/c plan

## 2020-08-17 NOTE — PROGRESS NOTES
Received report from AYE Bourne. Assumed care of pt [id number]. Pt resting comfortably. TeleSitter in place. Communication board updated. Bed alarm in place.

## 2020-08-17 NOTE — PROGRESS NOTES
Pt resting in bed. Comfort measures in place. Tele sitter in use. Safety and fall precautions in place. Rounding in place. All needs met at this moment.

## 2020-08-17 NOTE — PROGRESS NOTES
[facility] Medicine Daily Progress Note    Date of Service  8/17/2020    Chief Complaint  88 y.o. male admitted 8/3/2020 with   Chief Complaint   Patient presents with   • GLF     pt had GLF 1 week ago unknown LOC; c/o L sided chest wall pain and L arm and shoulder pain; blood glucose 170 per EMS         Hospital Course  This is a 88-year-old male with past medical history significant for hyperlipidemia, hypertension, dementia  presented to ER on 8/4/2020 after he was noted to  Have left sided shoulder pain and chest wall pain.  It is noted that patient had a ground-level fall 1 week ago, complaint of left-sided chest wall pain, left arm and shoulder pain.  He reports having said a ground-level fall, denied any dizziness.     Upon presentation in ER, he is noted to have macrocytosis with MCV of 106 , mildly elevated troponin at 38.  CT chest abdomen showed left clavicular fracture appears likely subacute.  ER.  Discussed with orthopedic surgery Dr Shields, appreciated his input.     CT head showed subtle area of low density in the right parieto-occipital subcortical white matter could represent vasogenic edema, MRI of brain  Showed encephalomalacia. Patient didn't have any neurological deficit. Considering macrocytosis vitamin B12 was obtained noted to be low, will provide vitamin B12 thousand MCG p.o. daily; mildly elevated TSH at 7s , will start synthroid 25 mcg po qd.     He is levated troponin at 38-->28-->22 ; EKG  Didn't show any ST and T wave changes; Echo showed  EF &0% and Moderate AS.  On 8/9/2020, patient noted to be confused agitated with facial droop, CT/CTA chest along with CT perfusion scan--> no acute ischemic CVA noted, reviewed records demonstrate that likely etiology is infectious.  Patient is noted to have elevated WBC at 13, noted to have gurgling sound chest x-ray consistent with pneumonia, patient was started on aspiration precaution, started on Rocephin plus doxycycline and was transferred to  telemetry for close monitoring.  Speech evaluated and that was started as per speech therapy recommendation.  Palliative continue to follow the patient, after next visit discussion with the palliative team with patient's power of , plan is to send patient and his wife to group home on hospice.  Hospice referral has been placed.  At this time his antibiotics were changed to oral antibiotics including Augmentin and doxycycline.    Interval Problem Update  Sleeping, no acute distress. He is no longer in restraints, but tele sitter is in place. He has stool on his hand, I clean it off.  He visited with his wife yesterday.     Consultants/Specialty  Neurology  Palliative  Psychiatry    Code Status  Comfort Care/DNR    Disposition  TBD, working on issues with finances currently. POA and SW are involved.     Review of Systems  Limited 2/2 patient mentation.  Denies chest pain or SOB.     Physical Exam  Temp:  [37.1 °C (98.7 °F)] 37.1 °C (98.7 °F)  Pulse:  [90] 90  Resp:  [16] 16  BP: (133)/(71) 133/71  SpO2:  [92 %] 92 %    Physical Exam  Vitals signs reviewed.   Constitutional:       General: He is sleeping. He is not in acute distress.     Appearance: Normal appearance. He is not diaphoretic.      Comments: Confused, but interactive   HENT:      Head: Normocephalic.      Mouth/Throat:      Comments: Tacky mucous membranes  Eyes:      General:         Right eye: No discharge.         Left eye: No discharge.      Extraocular Movements: Extraocular movements intact.   Neck:      Musculoskeletal: Normal range of motion. No neck rigidity.   Cardiovascular:      Rate and Rhythm: Normal rate and regular rhythm.      Heart sounds: Murmur present.   Pulmonary:      Effort: Pulmonary effort is normal. No respiratory distress.      Breath sounds: Rales present.   Abdominal:      General: There is no distension.      Palpations: Abdomen is soft.      Tenderness: There is no abdominal tenderness. There is no guarding.    Musculoskeletal:         General: No swelling or tenderness.   Skin:     General: Skin is warm.      Coloration: Skin is not jaundiced.   Neurological:      General: No focal deficit present.      Mental Status: He is easily aroused. He is disoriented.   Psychiatric:         Mood and Affect: Mood normal.         Speech: Speech is delayed.         Behavior: Behavior is slowed. Behavior is not aggressive. Behavior is cooperative.         Fluids  No intake or output data in the 24 hours ending 08/17/20 0643    Laboratory                        Imaging  DX-CHEST-LIMITED (1 VIEW)   Final Result      1.  Bibasilar interstitial infiltrate.      2.  Cardiomegaly.      CT-CTA NECK WITH & W/O-POST PROCESSING   Final Result      1.  Atherosclerotic plaque involving the left carotid bifurcation which results in less than 50% diameter narrowing.      2.  Atherosclerotic plaque involving the right carotid bifurcation which results in 50% diameter narrowing.      3.  Patent vertebral arteries.      CT-CTA HEAD WITH & W/O-POST PROCESS   Final Result      CT angiogram of the [place] within normal limits.      CT-CEREBRAL PERFUSION ANALYSIS   Final Result      1.  Cerebral blood flow less than 30% likely representing completed infarct = 0 mL.      2.  T Max more than 6 seconds likely representing combination of completed infarct and ischemia = 0 mL.      3.  Mismatched volume likely representing ischemic brain/penumbra = None      4.  Please note that the cerebral perfusion was performed on the limited brain tissue around the basal ganglia region. Infarct/ischemia outside the CT perfusion sections can be missed in this study.      CT-HEAD W/O   Final Result      1.  No evidence of acute intracranial process.      2.  Cerebral atrophy as well as periventricular chronic small vessel ischemic change.      MR-BRAIN-WITH & W/O   Final Result      1.  No acute abnormality.   2.  Moderate cerebral atrophy.   3.  Moderate chronic  microvascular ischemic disease.   4.  There is no evidence of vasogenic edema. There is focal encephalomalacia in the right frontal lobe likely secondary to the previous brain insult.      EC-ECHOCARDIOGRAM COMPLETE W/O CONT   Final Result      CT-CHEST (THORAX) WITH   Final Result         1.  Bilateral peripheral reticular pulmonary opacities, appearance suggests component of fibrosis.   2.  Bilateral dependent atelectasis is seen.   3.  Left clavicular fracture, appears likely subacute.   4.  Atherosclerosis and atherosclerotic coronary artery disease      Fleischner Society pulmonary nodule recommendations:         CT-HEAD W/O   Final Result         1.  Subtle area of low-density in the right parieto-occipital subcortical white matter, could represent vasogenic edema. Recommend further characterization with contrast-enhanced CT head or MRI to exclude intracranial mass lesion.   2.  Nonspecific white matter changes, commonly associated with small vessel ischemic disease.  Associated mild cerebral atrophy is noted.   3.  Atherosclerosis.      DX-SHOULDER 2+ LEFT   Final Result         1.  No acute traumatic bony injury.   2.  Degenerative changes of the acromioclavicular and glenohumeral joints.              Assessment/Plan  Intractable pain- (present on admission)  Assessment & Plan  Secondary to gait disorder with recurrent falls resulting in left clavicular fracture.   - case was discussed with Orthopedic surgery  - supportive care  - palliative care following, continue with comfort care measures  - hospice referral placed    Dementia with behavioral disturbance (HCC)- (present on admission)  Assessment & Plan  Unable to care for himself.   - psych consulted and recs depakote 250 mg tid along with seroquel  - Depakote level was therapeutic on 8/11  - psychiatry following, appreciate recs  - titrate medication as need for agitation and impulsivity    Clavicular fracture- (present on admission)  Assessment &  Plan  Subacute clavicular fracture on CT chest.  - discussed with Ortho, non operative    Aspiration pneumonia (HCC)- (present on admission)  Assessment & Plan  Likely secondary to aspiration event.  - blood cultures: NGTD  - s/p abx    Dysphagia- (present on admission)  Assessment & Plan  Patient is noted to have gurgling sound when eating/drinking.   - comfort care  - accepting risk and can eat as tolerated    Macrocytic anemia- (present on admission)  Assessment & Plan  2/2 vitamin b12 deficiency   -s/p IM injection    Vitamin B12 deficiency- (present on admission)  Assessment & Plan  B12 improved after repletion    Advance care planning- (present on admission)  Assessment & Plan  - Palliative care following  - continue comfort care measures    Vasogenic edema (HCC)- (present on admission)  Assessment & Plan  Suspected vasogenic edema in CT, MRI with focal encephalomalacia in the right frontal lobe and noted no edema noted       VTE prophylaxis: lovenox

## 2020-08-18 NOTE — PROGRESS NOTES
Received report from AYE Warren. Assumed care of pt [id number]. Pt resting comfortably. TeleSitter in place. Communication board updated. Bed alarm in place.

## 2020-08-18 NOTE — PROGRESS NOTES
Assumed patient care at 1900. Patient is comfort care. Patient is a two person assist. Patient turned as tolerated. Bed alarm on and in the low and locked position. Two hour rounding in place. Patient reports no pain and is resting comfortably at this time.

## 2020-08-18 NOTE — PROGRESS NOTES
[facility] Medicine Daily Progress Note    Date of Service  8/18/2020    Chief Complaint  88 y.o. male admitted 8/3/2020 with   Chief Complaint   Patient presents with   • GLF     pt had GLF 1 week ago unknown LOC; c/o L sided chest wall pain and L arm and shoulder pain; blood glucose 170 per EMS         Hospital Course  This is a 88-year-old male with past medical history significant for hyperlipidemia, hypertension, dementia  presented to ER on 8/4/2020 after he was noted to  Have left sided shoulder pain and chest wall pain.  It is noted that patient had a ground-level fall 1 week ago, complaint of left-sided chest wall pain, left arm and shoulder pain.  He reports having said a ground-level fall, denied any dizziness.     Upon presentation in ER, he is noted to have macrocytosis with MCV of 106 , mildly elevated troponin at 38.  CT chest abdomen showed left clavicular fracture appears likely subacute.  ER.  Discussed with orthopedic surgery Dr Shielsd, appreciated his input.     CT head showed subtle area of low density in the right parieto-occipital subcortical white matter could represent vasogenic edema, MRI of brain  Showed encephalomalacia. Patient didn't have any neurological deficit. Considering macrocytosis vitamin B12 was obtained noted to be low, will provide vitamin B12 thousand MCG p.o. daily; mildly elevated TSH at 7s , will start synthroid 25 mcg po qd.     He is levated troponin at 38-->28-->22 ; EKG  Didn't show any ST and T wave changes; Echo showed  EF &0% and Moderate AS.  On 8/9/2020, patient noted to be confused agitated with facial droop, CT/CTA chest along with CT perfusion scan--> no acute ischemic CVA noted, reviewed records demonstrate that likely etiology is infectious.  Patient is noted to have elevated WBC at 13, noted to have gurgling sound chest x-ray consistent with pneumonia, patient was started on aspiration precaution, started on Rocephin plus doxycycline and was transferred to  telemetry for close monitoring.  Speech evaluated and that was started as per speech therapy recommendation.  Palliative continue to follow the patient, after next visit discussion with the palliative team with patient's power of , plan is to send patient and his wife to group home on hospice.  Hospice referral has been placed.  At this time his antibiotics were changed to oral antibiotics including Augmentin and doxycycline.    Interval Problem Update  Patient seen and examined today.    Patient tolerating treatment and therapies.  All Data, Medication data reviewed.  Case discussed with nursing as available.  Plan of Care reviewed with patient and notified of changes.  8/18 the patient visited with his wife, he denies pain, continues to be confused  Consultants/Specialty  Neurology  Palliative  Psychiatry    Code Status  Comfort Care/DNR    Disposition  TBD, working on issues with finances currently. POA and SW are involved.     Review of Systems  Limited 2/2 patient mentation.  Denies chest pain or SOB.     Physical Exam  Temp:  [36.7 °C (98.1 °F)] 36.7 °C (98.1 °F)  Pulse:  [92] 92  Resp:  [20] 20  BP: (115)/(63) 115/63  SpO2:  [95 %] 95 %    Physical Exam  Vitals signs reviewed.   Constitutional:       General: He is sleeping. He is not in acute distress.     Appearance: Normal appearance. He is not diaphoretic.      Comments: Confused, but interactive   HENT:      Head: Normocephalic.   Eyes:      General:         Right eye: No discharge.         Left eye: No discharge.      Extraocular Movements: Extraocular movements intact.   Neck:      Musculoskeletal: Normal range of motion. No neck rigidity.   Cardiovascular:      Rate and Rhythm: Normal rate and regular rhythm.      Heart sounds: Murmur present.   Pulmonary:      Effort: Pulmonary effort is normal. No respiratory distress.      Breath sounds: Rales present.   Abdominal:      General: There is no distension.      Palpations: Abdomen is soft.       Tenderness: There is no abdominal tenderness. There is no guarding.   Musculoskeletal:         General: No swelling or tenderness.   Skin:     General: Skin is warm.      Coloration: Skin is not jaundiced.   Neurological:      General: No focal deficit present.      Mental Status: He is easily aroused. He is disoriented.   Psychiatric:         Mood and Affect: Mood normal.         Speech: Speech is delayed.         Behavior: Behavior is slowed. Behavior is not aggressive. Behavior is cooperative.         Fluids    Intake/Output Summary (Last 24 hours) at 8/18/2020 0759  Last data filed at 8/17/2020 1500  Gross per 24 hour   Intake 240 ml   Output 100 ml   Net 140 ml       Laboratory                        Imaging  DX-CHEST-LIMITED (1 VIEW)   Final Result      1.  Bibasilar interstitial infiltrate.      2.  Cardiomegaly.      CT-CTA NECK WITH & W/O-POST PROCESSING   Final Result      1.  Atherosclerotic plaque involving the left carotid bifurcation which results in less than 50% diameter narrowing.      2.  Atherosclerotic plaque involving the right carotid bifurcation which results in 50% diameter narrowing.      3.  Patent vertebral arteries.      CT-CTA HEAD WITH & W/O-POST PROCESS   Final Result      CT angiogram of the [place] within normal limits.      CT-CEREBRAL PERFUSION ANALYSIS   Final Result      1.  Cerebral blood flow less than 30% likely representing completed infarct = 0 mL.      2.  T Max more than 6 seconds likely representing combination of completed infarct and ischemia = 0 mL.      3.  Mismatched volume likely representing ischemic brain/penumbra = None      4.  Please note that the cerebral perfusion was performed on the limited brain tissue around the basal ganglia region. Infarct/ischemia outside the CT perfusion sections can be missed in this study.      CT-HEAD W/O   Final Result      1.  No evidence of acute intracranial process.      2.  Cerebral atrophy as well as periventricular  chronic small vessel ischemic change.      MR-BRAIN-WITH & W/O   Final Result      1.  No acute abnormality.   2.  Moderate cerebral atrophy.   3.  Moderate chronic microvascular ischemic disease.   4.  There is no evidence of vasogenic edema. There is focal encephalomalacia in the right frontal lobe likely secondary to the previous brain insult.      EC-ECHOCARDIOGRAM COMPLETE W/O CONT   Final Result      CT-CHEST (THORAX) WITH   Final Result         1.  Bilateral peripheral reticular pulmonary opacities, appearance suggests component of fibrosis.   2.  Bilateral dependent atelectasis is seen.   3.  Left clavicular fracture, appears likely subacute.   4.  Atherosclerosis and atherosclerotic coronary artery disease      Fleischner Society pulmonary nodule recommendations:         CT-HEAD W/O   Final Result         1.  Subtle area of low-density in the right parieto-occipital subcortical white matter, could represent vasogenic edema. Recommend further characterization with contrast-enhanced CT head or MRI to exclude intracranial mass lesion.   2.  Nonspecific white matter changes, commonly associated with small vessel ischemic disease.  Associated mild cerebral atrophy is noted.   3.  Atherosclerosis.      DX-SHOULDER 2+ LEFT   Final Result         1.  No acute traumatic bony injury.   2.  Degenerative changes of the acromioclavicular and glenohumeral joints.              Assessment/Plan  Dementia with behavioral disturbance (HCC)- (present on admission)  Assessment & Plan  Unable to care for himself.   - psych consulted and recs depakote 250 mg tid along with seroquel  - Depakote level was therapeutic on 8/11  - psychiatry following, appreciate recs  - titrate medication as need for agitation and impulsivity    Aspiration pneumonia (HCC)- (present on admission)  Assessment & Plan  Likely secondary to aspiration event.  - blood cultures: NGTD  - s/p abx    Clavicular fracture- (present on admission)  Assessment &  Plan  Subacute clavicular fracture on CT chest.  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   - case was discussed with Orthopedic surgery  - supportive care  - palliative care following, continue with comfort care measures  - hospice referral placed    Dysphagia- (present on admission)  Assessment & Plan  Patient is noted to have gurgling sound when eating/drinking.   - comfort care  - accepting risk and can eat as tolerated    Macrocytic anemia- (present on admission)  Assessment & Plan  2/2 vitamin b12 deficiency   -s/p IM injection    Vitamin B12 deficiency- (present on admission)  Assessment & Plan  B12 improved after repletion    Advance care planning- (present on admission)  Assessment & Plan  - Palliative care following  - continue comfort care measures    Vasogenic edema (HCC)- (present on admission)  Assessment & Plan  Suspected vasogenic edema in CT, MRI with focal encephalomalacia in the right frontal lobe and noted no edema noted     Plan  Continue with conservative treatment  Placement pending financial arrangements  See orders  Medically complex high risk    VTE prophylaxis: lovenox  I have performed a physical exam and reviewed and updated ROS and Plan today . In review of yesterday's note , there are no changes except as documented above.        Please note that this dictation was created using voice recognition software. I have made every reasonable attempt to correct obvious errors, but I expect that there are errors of grammar and possibly context that I did not discover before finalizing the note.

## 2020-08-18 NOTE — CONSULTS
"PSYCHIATRIC FOLLOW-UP:(established)  *Reason for admission: GLF with fx of L clavicle, pain worsening       *Legal Hold Status:  NA               *HPI:  Very pleasant but slow and not sure where he is although he did say \"[place]una[place]\" when asked about the hospital. He has been 4 days without agitation which he doesn't remember. He wonders about where he will go and if this or the other place will be safe. Reassured.  Then wants me to \"take him under\" and see him. Not sure he understood that I was from psychiatry though told.           *Psychiatric Examination:  Vitals: Blood pressure 115/63, pulse 92, temperature 36.7 °C (98.1 °F), temperature source Temporal, resp. rate 20, height 1.778 m (5' 10\"), weight 71.5 kg (157 lb 10.1 oz), SpO2 95 %.    General Appearance: intermittent to good eye contact   Abnormal Movements: none  Gait and Posture: propped up in bed eating.  Speech: wnl though mildly slowed  Thought processes: mildly slowed rate  Associations: generally linear though sometimes he miss hears and answers off topic.  Abnormal or Psychotic Thoughts: none  Judgement and Insight: fair  Orientation: to self, otherwise confused   Recent and Remote Memory: impaired  Attention Span and Concentration: intact  Language: not tested  Fund of Knowledge:not tested  Mood and Affect: euthymic  SI/HI:denies    Cranial Imaging:personally reviewed CT and MRI 2020: R frontal lobe encephalomalacia   EKG: personally reviewed QTc 456      *ASSESSMENT/PLAN:  1. Dementia with behavior disturbances  - encephalopathy appears resolved  -continue depakote 250 mg tid  -continue seroquel 25 mg bid           2. Medical  - abnormal MRI  -L clavicular fx, subacute       -intractable pain: referral to hospice  -dysphagia  -asp pna   -vit B12 deficiency and macrocytic anemia  -on comfort care    *Legal hold: NA.           *Signing off please reconsult as needed.      "

## 2020-08-18 NOTE — DISCHARGE PLANNING
Anticipated Discharge Disposition: [facility] with Hospice    Action: [facility] spoke with Lexie from [facility] regarding discharge plan; Lexie requesting to schedule phone assessments of pt this afternoon. CM spoke with bedside RN & scheduled assessment at 1630 per RN request. Lexie provided with RN contact information for assessment.    Barriers to Discharge: intermediate pending acceptance.     Plan: Continue to follow and assist with discharge plan.

## 2020-08-18 NOTE — CARE PLAN
Problem: Safety  Goal: Will remain free from falls  Outcome: PROGRESSING AS EXPECTED  Note: Call light within reach, bed in the low and locked position, patient wearing non-slip socks, and rounded on hourly.       Problem: Pain Management  Goal: Pain level will decrease to patient's comfort goal  Outcome: PROGRESSING AS EXPECTED  Note: Patient reports no pain at this time and is resting comfortably.

## 2020-08-19 NOTE — CARE PLAN
Problem: Pain Management  Goal: Pain level will decrease to patient's comfort goal  Outcome: PROGRESSING AS EXPECTED  Note: Patient educated on and offered pain management interventions.        Problem: Psychosocial Needs:  Goal: Level of anxiety will decrease  Outcome: PROGRESSING AS EXPECTED  Note: Patient is resting comfortably at this time and is displaying no signs of restlessness or anxiety.

## 2020-08-19 NOTE — CARE PLAN
Problem: Safety  Goal: Will remain free from injury  Outcome: PROGRESSING AS EXPECTED   Fall precautions in place, frequent rounding, pt free from falls this shift      Problem: Infection  Goal: Will remain free from infection  Outcome: PROGRESSING AS EXPECTED    No signs or symptoms of worsening infection

## 2020-08-19 NOTE — PROGRESS NOTES
[facility] Medicine Daily Progress Note    Date of Service  8/19/2020    Chief Complaint  88 y.o. male admitted 8/3/2020 with   Chief Complaint   Patient presents with   • GLF     pt had GLF 1 week ago unknown LOC; c/o L sided chest wall pain and L arm and shoulder pain; blood glucose 170 per EMS         Hospital Course  This is a 88-year-old male with past medical history significant for hyperlipidemia, hypertension, dementia  presented to ER on 8/4/2020 after he was noted to  Have left sided shoulder pain and chest wall pain.  It is noted that patient had a ground-level fall 1 week ago, complaint of left-sided chest wall pain, left arm and shoulder pain.  He reports having said a ground-level fall, denied any dizziness.     Upon presentation in ER, he is noted to have macrocytosis with MCV of 106 , mildly elevated troponin at 38.  CT chest abdomen showed left clavicular fracture appears likely subacute.  ER.  Discussed with orthopedic surgery Dr Shields, appreciated his input.     CT head showed subtle area of low density in the right parieto-occipital subcortical white matter could represent vasogenic edema, MRI of brain  Showed encephalomalacia. Patient didn't have any neurological deficit. Considering macrocytosis vitamin B12 was obtained noted to be low, will provide vitamin B12 thousand MCG p.o. daily; mildly elevated TSH at 7s , will start synthroid 25 mcg po qd.     He is levated troponin at 38-->28-->22 ; EKG  Didn't show any ST and T wave changes; Echo showed  EF &0% and Moderate AS.  On 8/9/2020, patient noted to be confused agitated with facial droop, CT/CTA chest along with CT perfusion scan--> no acute ischemic CVA noted, reviewed records demonstrate that likely etiology is infectious.  Patient is noted to have elevated WBC at 13, noted to have gurgling sound chest x-ray consistent with pneumonia, patient was started on aspiration precaution, started on Rocephin plus doxycycline and was transferred to  telemetry for close monitoring.  Speech evaluated and that was started as per speech therapy recommendation.  Palliative continue to follow the patient, after next visit discussion with the palliative team with patient's power of , plan is to send patient and his wife to group home on hospice.  Hospice referral has been placed.  At this time his antibiotics were changed to oral antibiotics including Augmentin and doxycycline.    Interval Problem Update  Patient seen and examined today.    Patient tolerating treatment and therapies.  All Data, Medication data reviewed.  Case discussed with nursing as available.  Plan of Care reviewed with patient and notified of changes.  8/18 the patient visited with his wife, he denies pain, continues to be confused  8/19 the patient continues to be confused, he does not appear agitated, he is alert and oriented x1-2, he cannot tell me what happened, why he is in the hospital and what the plan is.  Consultants/Specialty  Neurology  Palliative  Psychiatry    Code Status  Comfort Care/DNR    Disposition  TBD, working on issues with finances currently. POA and SW are involved.     Review of Systems  Limited 2/2 patient mentation.  Denies chest pain or SOB.     Physical Exam       Physical Exam  Vitals signs reviewed.   Constitutional:       General: He is sleeping. He is not in acute distress.     Appearance: Normal appearance. He is not diaphoretic.      Comments: Confused, but interactive   HENT:      Head: Normocephalic.   Eyes:      General:         Right eye: No discharge.         Left eye: No discharge.      Extraocular Movements: Extraocular movements intact.   Neck:      Musculoskeletal: Normal range of motion. No neck rigidity.   Cardiovascular:      Rate and Rhythm: Normal rate and regular rhythm.      Heart sounds: Murmur present.   Pulmonary:      Effort: Pulmonary effort is normal. No respiratory distress.      Breath sounds: Rales present.   Abdominal:       General: There is no distension.      Palpations: Abdomen is soft.      Tenderness: There is no abdominal tenderness. There is no guarding.   Musculoskeletal:         General: No swelling or tenderness.   Skin:     General: Skin is warm.      Coloration: Skin is not jaundiced.   Neurological:      General: No focal deficit present.      Mental Status: He is easily aroused. He is disoriented.   Psychiatric:         Mood and Affect: Mood normal.         Speech: Speech is delayed.         Behavior: Behavior is slowed. Behavior is not aggressive. Behavior is cooperative.         Fluids    Intake/Output Summary (Last 24 hours) at 8/19/2020 1419  Last data filed at 8/19/2020 1400  Gross per 24 hour   Intake 240 ml   Output --   Net 240 ml       Laboratory                        Imaging  DX-CHEST-LIMITED (1 VIEW)   Final Result      1.  Bibasilar interstitial infiltrate.      2.  Cardiomegaly.      CT-CTA NECK WITH & W/O-POST PROCESSING   Final Result      1.  Atherosclerotic plaque involving the left carotid bifurcation which results in less than 50% diameter narrowing.      2.  Atherosclerotic plaque involving the right carotid bifurcation which results in 50% diameter narrowing.      3.  Patent vertebral arteries.      CT-CTA HEAD WITH & W/O-POST PROCESS   Final Result      CT angiogram of the [place] within normal limits.      CT-CEREBRAL PERFUSION ANALYSIS   Final Result      1.  Cerebral blood flow less than 30% likely representing completed infarct = 0 mL.      2.  T Max more than 6 seconds likely representing combination of completed infarct and ischemia = 0 mL.      3.  Mismatched volume likely representing ischemic brain/penumbra = None      4.  Please note that the cerebral perfusion was performed on the limited brain tissue around the basal ganglia region. Infarct/ischemia outside the CT perfusion sections can be missed in this study.      CT-HEAD W/O   Final Result      1.  No evidence of acute  intracranial process.      2.  Cerebral atrophy as well as periventricular chronic small vessel ischemic change.      MR-BRAIN-WITH & W/O   Final Result      1.  No acute abnormality.   2.  Moderate cerebral atrophy.   3.  Moderate chronic microvascular ischemic disease.   4.  There is no evidence of vasogenic edema. There is focal encephalomalacia in the right frontal lobe likely secondary to the previous brain insult.      EC-ECHOCARDIOGRAM COMPLETE W/O CONT   Final Result      CT-CHEST (THORAX) WITH   Final Result         1.  Bilateral peripheral reticular pulmonary opacities, appearance suggests component of fibrosis.   2.  Bilateral dependent atelectasis is seen.   3.  Left clavicular fracture, appears likely subacute.   4.  Atherosclerosis and atherosclerotic coronary artery disease      Fleischner Society pulmonary nodule recommendations:         CT-HEAD W/O   Final Result         1.  Subtle area of low-density in the right parieto-occipital subcortical white matter, could represent vasogenic edema. Recommend further characterization with contrast-enhanced CT head or MRI to exclude intracranial mass lesion.   2.  Nonspecific white matter changes, commonly associated with small vessel ischemic disease.  Associated mild cerebral atrophy is noted.   3.  Atherosclerosis.      DX-SHOULDER 2+ LEFT   Final Result         1.  No acute traumatic bony injury.   2.  Degenerative changes of the acromioclavicular and glenohumeral joints.              Assessment/Plan  Dementia with behavioral disturbance (HCC)- (present on admission)  Assessment & Plan  Unable to care for himself.   - psych consulted and recs depakote 250 mg tid along with seroquel  - Depakote level was therapeutic on 8/11  - psychiatry following, appreciate recs  - titrate medication as need for agitation and impulsivity    Aspiration pneumonia (HCC)- (present on admission)  Assessment & Plan  Likely secondary to aspiration event.  - blood cultures:  NGTD  - s/p abx    Clavicular fracture- (present on admission)  Assessment & Plan  Subacute clavicular fracture on CT chest.  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   - case was discussed with Orthopedic surgery  - supportive care  - palliative care following, continue with comfort care measures  - hospice referral placed    Dysphagia- (present on admission)  Assessment & Plan  Patient is noted to have gurgling sound when eating/drinking.   - comfort care  - accepting risk and can eat as tolerated    Macrocytic anemia- (present on admission)  Assessment & Plan  2/2 vitamin b12 deficiency   -s/p IM injection    Vitamin B12 deficiency- (present on admission)  Assessment & Plan  B12 improved after repletion    Advance care planning- (present on admission)  Assessment & Plan  - Palliative care following  - continue comfort care measures    Vasogenic edema (HCC)- (present on admission)  Assessment & Plan  Suspected vasogenic edema in CT, MRI with focal encephalomalacia in the right frontal lobe and noted no edema noted     Plan  Continue with conservative treatment  Patient lacks capacity for medical, financial and general decision-making  Placement pending financial arrangements  See orders  Medically complex high risk    VTE prophylaxis: lovenox  I have performed a physical exam and reviewed and updated ROS and Plan today . In review of yesterday's note , there are no changes except as documented above.        Please note that this dictation was created using voice recognition software. I have made every reasonable attempt to correct obvious errors, but I expect that there are errors of grammar and possibly context that I did not discover before finalizing the note.

## 2020-08-19 NOTE — PROGRESS NOTES
Assumed patient care at 1900. Patient is comfort care and confused. Patient is a two person assist. Patient turned as tolerated. Bed alarm on and in the low and locked position. Two hour rounding in place. Patient denies any pain at this time and is resting comfortably.

## 2020-08-19 NOTE — PROGRESS NOTES
Pt oriented to self, impulsive but easily redirectable pleasant, continent of bowel and bladder this shift, x 1 assist with front wheel walker.    Able to make needs known, pt observed while eating and drinking meals, no observed aspiration. No reports of pain will continue to monitor ands support

## 2020-08-20 NOTE — CARE PLAN
Problem: Safety  Goal: Will remain free from injury  Outcome: PROGRESSING AS EXPECTED  Note: Call light within reach, bed in the low and locked position, patient wearing non-slip socks, and rounded on hourly.       Problem: Medication  Goal: Compliance with prescribed medication will improve  Outcome: PROGRESSING AS EXPECTED  Note: Patient did not fight taking his medications this evening.

## 2020-08-20 NOTE — PROGRESS NOTES
[facility] Medicine Daily Progress Note    Date of Service  8/20/2020    Chief Complaint  88 y.o. male admitted 8/3/2020 with   Chief Complaint   Patient presents with   • GLF     pt had GLF 1 week ago unknown LOC; c/o L sided chest wall pain and L arm and shoulder pain; blood glucose 170 per EMS         Hospital Course  This is a 88-year-old male with past medical history significant for hyperlipidemia, hypertension, dementia  presented to ER on 8/4/2020 after he was noted to  Have left sided shoulder pain and chest wall pain.  It is noted that patient had a ground-level fall 1 week ago, complaint of left-sided chest wall pain, left arm and shoulder pain.  He reports having said a ground-level fall, denied any dizziness.     Upon presentation in ER, he is noted to have macrocytosis with MCV of 106 , mildly elevated troponin at 38.  CT chest abdomen showed left clavicular fracture appears likely subacute.  ER.  Discussed with orthopedic surgery Dr Shields, appreciated his input.     CT head showed subtle area of low density in the right parieto-occipital subcortical white matter could represent vasogenic edema, MRI of brain  Showed encephalomalacia. Patient didn't have any neurological deficit. Considering macrocytosis vitamin B12 was obtained noted to be low, will provide vitamin B12 thousand MCG p.o. daily; mildly elevated TSH at 7s , will start synthroid 25 mcg po qd.     He is levated troponin at 38-->28-->22 ; EKG  Didn't show any ST and T wave changes; Echo showed  EF &0% and Moderate AS.  On 8/9/2020, patient noted to be confused agitated with facial droop, CT/CTA chest along with CT perfusion scan--> no acute ischemic CVA noted, reviewed records demonstrate that likely etiology is infectious.  Patient is noted to have elevated WBC at 13, noted to have gurgling sound chest x-ray consistent with pneumonia, patient was started on aspiration precaution, started on Rocephin plus doxycycline and was transferred to  telemetry for close monitoring.  Speech evaluated and that was started as per speech therapy recommendation.  Palliative continue to follow the patient, after next visit discussion with the palliative team with patient's power of , plan is to send patient and his wife to group home on hospice.  Hospice referral has been placed.  At this time his antibiotics were changed to oral antibiotics including Augmentin and doxycycline.    Interval Problem Update  Patient seen and examined today.    Patient tolerating treatment and therapies.  All Data, Medication data reviewed.  Case discussed with nursing as available.  Plan of Care reviewed with patient and notified of changes.  8/18 the patient visited with his wife, he denies pain, continues to be confused  8/19 the patient continues to be confused, he does not appear agitated, he is alert and oriented x1-2, he cannot tell me what happened, why he is in the hospital and what the plan is.  8/20 the patient is resting, no current physical complaints, transfer to medical unit pending  Consultants/Specialty  Neurology  Palliative  Psychiatry    Code Status  Comfort Care/DNR    Disposition  TBD, working on issues with finances currently. POA and SW are involved.     Review of Systems  Limited 2/2 patient mentation.  Denies chest pain or SOB.     Physical Exam       Physical Exam  Vitals signs reviewed.   Constitutional:       General: He is sleeping. He is not in acute distress.     Appearance: Normal appearance. He is not diaphoretic.      Comments: Confused, but interactive   HENT:      Head: Normocephalic.   Eyes:      General:         Right eye: No discharge.         Left eye: No discharge.      Extraocular Movements: Extraocular movements intact.   Neck:      Musculoskeletal: Normal range of motion. No neck rigidity.   Cardiovascular:      Rate and Rhythm: Normal rate and regular rhythm.      Heart sounds: Murmur present.   Pulmonary:      Effort: Pulmonary effort  is normal. No respiratory distress.      Breath sounds: Rales present.   Abdominal:      General: There is no distension.      Palpations: Abdomen is soft.      Tenderness: There is no abdominal tenderness. There is no guarding.   Musculoskeletal:         General: No swelling or tenderness.   Skin:     General: Skin is warm.      Coloration: Skin is not jaundiced.   Neurological:      General: No focal deficit present.      Mental Status: He is easily aroused. He is disoriented.   Psychiatric:         Mood and Affect: Mood normal.         Speech: Speech is delayed.         Behavior: Behavior is slowed. Behavior is not aggressive. Behavior is cooperative.         Fluids    Intake/Output Summary (Last 24 hours) at 8/20/2020 1354  Last data filed at 8/19/2020 1400  Gross per 24 hour   Intake 120 ml   Output --   Net 120 ml       Laboratory                        Imaging  DX-CHEST-LIMITED (1 VIEW)   Final Result      1.  Bibasilar interstitial infiltrate.      2.  Cardiomegaly.      CT-CTA NECK WITH & W/O-POST PROCESSING   Final Result      1.  Atherosclerotic plaque involving the left carotid bifurcation which results in less than 50% diameter narrowing.      2.  Atherosclerotic plaque involving the right carotid bifurcation which results in 50% diameter narrowing.      3.  Patent vertebral arteries.      CT-CTA HEAD WITH & W/O-POST PROCESS   Final Result      CT angiogram of the [place] within normal limits.      CT-CEREBRAL PERFUSION ANALYSIS   Final Result      1.  Cerebral blood flow less than 30% likely representing completed infarct = 0 mL.      2.  T Max more than 6 seconds likely representing combination of completed infarct and ischemia = 0 mL.      3.  Mismatched volume likely representing ischemic brain/penumbra = None      4.  Please note that the cerebral perfusion was performed on the limited brain tissue around the basal ganglia region. Infarct/ischemia outside the CT perfusion sections can be  missed in this study.      CT-HEAD W/O   Final Result      1.  No evidence of acute intracranial process.      2.  Cerebral atrophy as well as periventricular chronic small vessel ischemic change.      MR-BRAIN-WITH & W/O   Final Result      1.  No acute abnormality.   2.  Moderate cerebral atrophy.   3.  Moderate chronic microvascular ischemic disease.   4.  There is no evidence of vasogenic edema. There is focal encephalomalacia in the right frontal lobe likely secondary to the previous brain insult.      EC-ECHOCARDIOGRAM COMPLETE W/O CONT   Final Result      CT-CHEST (THORAX) WITH   Final Result         1.  Bilateral peripheral reticular pulmonary opacities, appearance suggests component of fibrosis.   2.  Bilateral dependent atelectasis is seen.   3.  Left clavicular fracture, appears likely subacute.   4.  Atherosclerosis and atherosclerotic coronary artery disease      Fleischner Society pulmonary nodule recommendations:         CT-HEAD W/O   Final Result         1.  Subtle area of low-density in the right parieto-occipital subcortical white matter, could represent vasogenic edema. Recommend further characterization with contrast-enhanced CT head or MRI to exclude intracranial mass lesion.   2.  Nonspecific white matter changes, commonly associated with small vessel ischemic disease.  Associated mild cerebral atrophy is noted.   3.  Atherosclerosis.      DX-SHOULDER 2+ LEFT   Final Result         1.  No acute traumatic bony injury.   2.  Degenerative changes of the acromioclavicular and glenohumeral joints.              Assessment/Plan  Dementia with behavioral disturbance (HCC)- (present on admission)  Assessment & Plan  Unable to care for himself.   - psych consulted and recs depakote 250 mg tid along with seroquel  - Depakote level was therapeutic on 8/11  - psychiatry following, appreciate recs  - titrate medication as need for agitation and impulsivity    Aspiration pneumonia (HCC)- (present on  admission)  Assessment & Plan  Likely secondary to aspiration event.  - blood cultures: NGTD  - s/p abx    Clavicular fracture- (present on admission)  Assessment & Plan  Subacute clavicular fracture on CT chest.  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   - case was discussed with Orthopedic surgery  - supportive care  - palliative care following, continue with comfort care measures  - hospice referral placed    Dysphagia- (present on admission)  Assessment & Plan  Patient is noted to have gurgling sound when eating/drinking.   - comfort care  - accepting risk and can eat as tolerated    Macrocytic anemia- (present on admission)  Assessment & Plan  2/2 vitamin b12 deficiency   -s/p IM injection    Vitamin B12 deficiency- (present on admission)  Assessment & Plan  B12 improved after repletion    Advance care planning- (present on admission)  Assessment & Plan  - Palliative care following  - continue comfort care measures    Vasogenic edema (HCC)- (present on admission)  Assessment & Plan  Suspected vasogenic edema in CT, MRI with focal encephalomalacia in the right frontal lobe and noted no edema noted     Plan  Continue with conservative treatment  Patient lacks capacity for medical, financial and general decision-making  Placement pending financial arrangements  See orders  Medically complex high risk    VTE prophylaxis: lovenox  I have performed a physical exam and reviewed and updated ROS and Plan today . In review of yesterday's note , there are no changes except as documented above.        Please note that this dictation was created using voice recognition software. I have made every reasonable attempt to correct obvious errors, but I expect that there are errors of grammar and possibly context that I did not discover before finalizing the note.

## 2020-08-20 NOTE — CARE PLAN
Problem: Safety  Goal: Will remain free from injury  Outcome: PROGRESSING AS EXPECTED   Fall precautions in place, frequent rounding in place, pt free from falls this shift      Problem: Medication  Goal: Compliance with prescribed medication will improve  Outcome: PROGRESSING AS EXPECTED   Pt has no reported pain this shift

## 2020-08-20 NOTE — DISCHARGE PLANNING
Anticipated Discharge Disposition: [facility] with [facility]     Action: Spoke with Lexie at [facility] ([phone]) and she stated that they can accept the patient in to their memory care side with 24/7 caregivers. Lexie already confirmed with Mariam at [facility] that they will be following this patient. Lexie is going to fax paperwork that needs to be filled out by MD.    Barriers to Discharge: [facility] paperwork completion    Plan: Will follow up

## 2020-08-20 NOTE — PROGRESS NOTES
Pt oriented to self, incontinent of urine this shift, slept most of shift. Ate lunch and complaint with medications. RN observed pt while eating no signs  Of aspiration. Pt able to feed himself.    Easily redirected, frequent rounding in place, will continue to monitor and support

## 2020-08-21 NOTE — CARE PLAN
Problem: Communication  Goal: The ability to communicate needs accurately and effectively will improve  Outcome: PROGRESSING AS EXPECTED   Patient updated on POC.     Problem: Safety  Goal: Will remain free from falls  Outcome: PROGRESSING AS EXPECTED   Fall precautions in place. Bed on the lowest position, non skid socks on, call light within reach, educated on calling for assistance, rounding in place.

## 2020-08-21 NOTE — DISCHARGE PLANNING
Per request [facility] sent imaging and lab results to [place].

## 2020-08-21 NOTE — PROGRESS NOTES
Received report from AYE Pringle. Assumed care at 1900. Pt is A&O to self at this time, denies pain, nausea/vomiting. Pt has no IV access, MD aware. Pt repositions self without assistance. Comfort care measures in place, wafffle overlay in place, dong care done with incontinence episodes, heels floated with pillows. Bed alarm is on. Assessed and discussed plan of care. Bed in lowest position, call light within reach, educated patient to call for assistance, non skid socks on, rounding in place.

## 2020-08-21 NOTE — PROGRESS NOTES
Received report from night RN. Pt is on comfort care measures. A&Ox1- oriented to self only. Tele sitter in place for safety. Pt does not call appropriately for help. Pt is incontinent of bowel and bladder. Poor po intake. Bed alarm on, call light in reach, bed in low position, hourly rounding in place.

## 2020-08-21 NOTE — PROGRESS NOTES
[facility] Medicine Daily Progress Note    Date of Service  8/21/2020    Chief Complaint  88 y.o. male admitted 8/3/2020 with   Chief Complaint   Patient presents with   • GLF     pt had GLF 1 week ago unknown LOC; c/o L sided chest wall pain and L arm and shoulder pain; blood glucose 170 per EMS         Hospital Course  This is a 88-year-old male with past medical history significant for hyperlipidemia, hypertension, dementia  presented to ER on 8/4/2020 after he was noted to  Have left sided shoulder pain and chest wall pain.  It is noted that patient had a ground-level fall 1 week ago, complaint of left-sided chest wall pain, left arm and shoulder pain.  He reports having said a ground-level fall, denied any dizziness.     Upon presentation in ER, he is noted to have macrocytosis with MCV of 106 , mildly elevated troponin at 38.  CT chest abdomen showed left clavicular fracture appears likely subacute.  ER.  Discussed with orthopedic surgery Dr Shields, appreciated his input.     CT head showed subtle area of low density in the right parieto-occipital subcortical white matter could represent vasogenic edema, MRI of brain  Showed encephalomalacia. Patient didn't have any neurological deficit. Considering macrocytosis vitamin B12 was obtained noted to be low, will provide vitamin B12 thousand MCG p.o. daily; mildly elevated TSH at 7s , will start synthroid 25 mcg po qd.     He is levated troponin at 38-->28-->22 ; EKG  Didn't show any ST and T wave changes; Echo showed  EF &0% and Moderate AS.  On 8/9/2020, patient noted to be confused agitated with facial droop, CT/CTA chest along with CT perfusion scan--> no acute ischemic CVA noted, reviewed records demonstrate that likely etiology is infectious.  Patient is noted to have elevated WBC at 13, noted to have gurgling sound chest x-ray consistent with pneumonia, patient was started on aspiration precaution, started on Rocephin plus doxycycline and was transferred to  telemetry for close monitoring.  Speech evaluated and that was started as per speech therapy recommendation.  Palliative continue to follow the patient, after next visit discussion with the palliative team with patient's power of , plan is to send patient and his wife to group home on hospice.  Hospice referral has been placed.  At this time his antibiotics were changed to oral antibiotics including Augmentin and doxycycline.    Interval Problem Update  Patient seen and examined today.    Patient tolerating treatment and therapies.  All Data, Medication data reviewed.  Case discussed with nursing as available.  Plan of Care reviewed with patient and notified of changes.  8/18 the patient visited with his wife, he denies pain, continues to be confused  8/19 the patient continues to be confused, he does not appear agitated, he is alert and oriented x1-2, he cannot tell me what happened, why he is in the hospital and what the plan is.  8/20 the patient is resting, no current physical complaints, transfer to medical unit pending  8/21 the patient is resting, no acute overnight events, the patient is incontinent of bowel and bladder, poor p.o. intake but takes oral nutrition  Consultants/Specialty  Neurology  Palliative  Psychiatry    Code Status  Comfort Care/DNR    Disposition  TBD, plan to transfer to [facility] together with his wife on Monday hopefully    Review of Systems  Limited 2/2 patient mentation.      Physical Exam       Physical Exam  Vitals signs reviewed.   Constitutional:       General: He is sleeping. He is not in acute distress.     Appearance: Normal appearance. He is not diaphoretic.      Comments: Confused, but interactive   HENT:      Head: Normocephalic.   Eyes:      General:         Right eye: No discharge.         Left eye: No discharge.      Extraocular Movements: Extraocular movements intact.   Neck:      Musculoskeletal: Normal range of motion. No neck rigidity.   Cardiovascular:       Rate and Rhythm: Normal rate and regular rhythm.      Heart sounds: Murmur present.   Pulmonary:      Effort: Pulmonary effort is normal. No respiratory distress.      Breath sounds: Rales present.   Abdominal:      General: There is no distension.      Palpations: Abdomen is soft.      Tenderness: There is no abdominal tenderness. There is no guarding.   Musculoskeletal:         General: No swelling or tenderness.   Skin:     General: Skin is warm.      Coloration: Skin is not jaundiced.   Neurological:      General: No focal deficit present.      Mental Status: He is easily aroused. He is disoriented.   Psychiatric:         Mood and Affect: Mood normal.         Speech: Speech is delayed.         Behavior: Behavior is slowed. Behavior is not aggressive. Behavior is cooperative.         Fluids    Intake/Output Summary (Last 24 hours) at 8/21/2020 1431  Last data filed at 8/21/2020 1305  Gross per 24 hour   Intake 340 ml   Output --   Net 340 ml       Laboratory                        Imaging  DX-CHEST-LIMITED (1 VIEW)   Final Result      1.  Bibasilar interstitial infiltrate.      2.  Cardiomegaly.      CT-CTA NECK WITH & W/O-POST PROCESSING   Final Result      1.  Atherosclerotic plaque involving the left carotid bifurcation which results in less than 50% diameter narrowing.      2.  Atherosclerotic plaque involving the right carotid bifurcation which results in 50% diameter narrowing.      3.  Patent vertebral arteries.      CT-CTA HEAD WITH & W/O-POST PROCESS   Final Result      CT angiogram of the [place] within normal limits.      CT-CEREBRAL PERFUSION ANALYSIS   Final Result      1.  Cerebral blood flow less than 30% likely representing completed infarct = 0 mL.      2.  T Max more than 6 seconds likely representing combination of completed infarct and ischemia = 0 mL.      3.  Mismatched volume likely representing ischemic brain/penumbra = None      4.  Please note that the cerebral perfusion  was performed on the limited brain tissue around the basal ganglia region. Infarct/ischemia outside the CT perfusion sections can be missed in this study.      CT-HEAD W/O   Final Result      1.  No evidence of acute intracranial process.      2.  Cerebral atrophy as well as periventricular chronic small vessel ischemic change.      MR-BRAIN-WITH & W/O   Final Result      1.  No acute abnormality.   2.  Moderate cerebral atrophy.   3.  Moderate chronic microvascular ischemic disease.   4.  There is no evidence of vasogenic edema. There is focal encephalomalacia in the right frontal lobe likely secondary to the previous brain insult.      EC-ECHOCARDIOGRAM COMPLETE W/O CONT   Final Result      CT-CHEST (THORAX) WITH   Final Result         1.  Bilateral peripheral reticular pulmonary opacities, appearance suggests component of fibrosis.   2.  Bilateral dependent atelectasis is seen.   3.  Left clavicular fracture, appears likely subacute.   4.  Atherosclerosis and atherosclerotic coronary artery disease      Fleischner Society pulmonary nodule recommendations:         CT-HEAD W/O   Final Result         1.  Subtle area of low-density in the right parieto-occipital subcortical white matter, could represent vasogenic edema. Recommend further characterization with contrast-enhanced CT head or MRI to exclude intracranial mass lesion.   2.  Nonspecific white matter changes, commonly associated with small vessel ischemic disease.  Associated mild cerebral atrophy is noted.   3.  Atherosclerosis.      DX-SHOULDER 2+ LEFT   Final Result         1.  No acute traumatic bony injury.   2.  Degenerative changes of the acromioclavicular and glenohumeral joints.              Assessment/Plan  Dementia with behavioral disturbance (HCC)- (present on admission)  Assessment & Plan  Unable to care for himself.   - psych consulted and recs depakote 250 mg tid along with seroquel  - Depakote level was therapeutic on 8/11  - psychiatry  following, appreciate recs  - titrate medication as need for agitation and impulsivity    Aspiration pneumonia (HCC)- (present on admission)  Assessment & Plan  Likely secondary to aspiration event.  - blood cultures: NGTD  - s/p abx    Clavicular fracture- (present on admission)  Assessment & Plan  Subacute clavicular fracture on CT chest.  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   - case was discussed with Orthopedic surgery  - supportive care  - palliative care following, continue with comfort care measures  - hospice referral placed    Dysphagia- (present on admission)  Assessment & Plan  Patient is noted to have gurgling sound when eating/drinking.   - comfort care  - accepting risk and can eat as tolerated    Macrocytic anemia- (present on admission)  Assessment & Plan  2/2 vitamin b12 deficiency   -s/p IM injection    Vitamin B12 deficiency- (present on admission)  Assessment & Plan  B12 improved after repletion    Advance care planning- (present on admission)  Assessment & Plan  - Palliative care following  - continue comfort care measures    Vasogenic edema (HCC)- (present on admission)  Assessment & Plan  Suspected vasogenic edema in CT, MRI with focal encephalomalacia in the right frontal lobe and noted no edema noted     Plan  Continue with conservative treatment  Patient lacks capacity for medical, financial and general decision-making  Placement pending financial arrangements  See orders  Medically complex high risk  Discharge transfer apparently pending for Monday.    VTE prophylaxis: lovenox  I have performed a physical exam and reviewed and updated ROS and Plan today . In review of yesterday's note , there are no changes except as documented above.        Please note that this dictation was created using voice recognition software. I have made every  reasonable attempt to correct obvious errors, but I expect that there are errors of grammar and possibly context that I did not discover before finalizing the note.

## 2020-08-21 NOTE — CARE PLAN
Problem: Safety  Goal: Will remain free from falls  Outcome: PROGRESSING AS EXPECTED  Intervention: Implement fall precautions  Flowsheets  Taken 8/21/2020 1358  Bedrails: Bedrails Closest to Bathroom Down  Chair/Bed Strip Alarm: Yes - Alarm On  Taken 8/21/2020 1305  Environmental Precautions:   Treaded Slipper Socks on Patient   Personal Belongings, Wastebasket, Call Bell etc. in Easy Reach   Transferred to Stronger Side   Report Given to Other Health Care Providers Regarding Fall Risk   Bed in Low Position   Communication Sign for Patients & Families   Mobility Assessed & Appropriate Sign Placed  Note: Tele sitter in place for safety     Problem: Discharge Barriers/Planning  Goal: Patient's continuum of care needs will be met  Outcome: PROGRESSING AS EXPECTED  Intervention: Assess potential discharge barriers on admission and throughout hospital stay  Note: Waiting on paperwork from MD for discharge

## 2020-08-22 NOTE — PROGRESS NOTES
Received report from AYE Garcia. Assumed care at 1900. Pt is A&O to self at this time, patient is impulsive, agitated and verbally aggressive this evening.  Tele sitter at bedside. Denies pain, nausea/vomiting. Pt has no IV access, MD aware. Pt ambulated to kaplan with assistance of 1 with a FWW. Medications floated with pudding. Comfort care measures in place, wafffle overlay in place, dong care done with incontinence episodes, heels floated with pillows. Bed alarm is on. Assessed and discussed plan of care. Bed in lowest position, call light within reach, educated patient to call for assistance, non skid socks on, rounding in place.

## 2020-08-22 NOTE — CARE PLAN
Problem: Bowel/Gastric:  Goal: Normal bowel function is maintained or improved  Outcome: PROGRESSING SLOWER THAN EXPECTED  Intervention: Educate patient and significant other/support system about diet, fluid intake, medications and activity to promote bowel function  Note: Last BM 8/20  Pt with poor po intake       Problem: Pain Management  Goal: Pain level will decrease to patient's comfort goal  Outcome: PROGRESSING AS EXPECTED  Intervention: Follow pain managment plan developed in collaboration with patient and Interdisciplinary Team  Note: Pt denies c/o pain

## 2020-08-22 NOTE — PROGRESS NOTES
[facility] Medicine Daily Progress Note    Date of Service  8/22/2020    Chief Complaint  88 y.o. male admitted 8/3/2020 with   Chief Complaint   Patient presents with   • GLF     pt had GLF 1 week ago unknown LOC; c/o L sided chest wall pain and L arm and shoulder pain; blood glucose 170 per EMS         Hospital Course  This is a 88-year-old male with past medical history significant for hyperlipidemia, hypertension, dementia  presented to ER on 8/4/2020 after he was noted to  Have left sided shoulder pain and chest wall pain.  It is noted that patient had a ground-level fall 1 week ago, complaint of left-sided chest wall pain, left arm and shoulder pain.  He reports having said a ground-level fall, denied any dizziness.     Upon presentation in ER, he is noted to have macrocytosis with MCV of 106 , mildly elevated troponin at 38.  CT chest abdomen showed left clavicular fracture appears likely subacute.  ER.  Discussed with orthopedic surgery Dr Shields, appreciated his input.     CT head showed subtle area of low density in the right parieto-occipital subcortical white matter could represent vasogenic edema, MRI of brain  Showed encephalomalacia. Patient didn't have any neurological deficit. Considering macrocytosis vitamin B12 was obtained noted to be low, will provide vitamin B12 thousand MCG p.o. daily; mildly elevated TSH at 7s , will start synthroid 25 mcg po qd.     He is levated troponin at 38-->28-->22 ; EKG  Didn't show any ST and T wave changes; Echo showed  EF &0% and Moderate AS.  On 8/9/2020, patient noted to be confused agitated with facial droop, CT/CTA chest along with CT perfusion scan--> no acute ischemic CVA noted, reviewed records demonstrate that likely etiology is infectious.  Patient is noted to have elevated WBC at 13, noted to have gurgling sound chest x-ray consistent with pneumonia, patient was started on aspiration precaution, started on Rocephin plus doxycycline and was transferred to  telemetry for close monitoring.  Speech evaluated and that was started as per speech therapy recommendation.  Palliative continue to follow the patient, after next visit discussion with the palliative team with patient's power of , plan is to send patient and his wife to group home on hospice.  Hospice referral has been placed.  At this time his antibiotics were changed to oral antibiotics including Augmentin and doxycycline.    Interval Problem Update  Patient seen and examined today.    Patient tolerating treatment and therapies.  All Data, Medication data reviewed.  Case discussed with nursing as available.  Plan of Care reviewed with patient and notified of changes.  8/18 the patient visited with his wife, he denies pain, continues to be confused  8/19 the patient continues to be confused, he does not appear agitated, he is alert and oriented x1-2, he cannot tell me what happened, why he is in the hospital and what the plan is.  8/20 the patient is resting, no current physical complaints, transfer to medical unit pending  8/21 the patient is resting, no acute overnight events, the patient is incontinent of bowel and bladder, poor p.o. intake but takes oral nutrition  8/22 the patient is resting, no distress, no discomfort noted  Consultants/Specialty  Neurology  Palliative  Psychiatry    Code Status  Comfort Care/DNR    Disposition  TBD, plan to transfer to [facility] together with his wife on Monday hopefully    Review of Systems  Limited 2/2 patient mentation.      Physical Exam       Physical Exam  Vitals signs reviewed.   Constitutional:       General: He is sleeping. He is not in acute distress.     Appearance: Normal appearance. He is not diaphoretic.      Comments: Confused, but interactive   HENT:      Head: Normocephalic.   Eyes:      General:         Right eye: No discharge.         Left eye: No discharge.      Extraocular Movements: Extraocular movements intact.   Neck:       Musculoskeletal: Normal range of motion. No neck rigidity.   Cardiovascular:      Rate and Rhythm: Normal rate and regular rhythm.      Heart sounds: Murmur present.   Pulmonary:      Effort: Pulmonary effort is normal. No respiratory distress.      Breath sounds: Rales present.   Abdominal:      General: There is no distension.      Palpations: Abdomen is soft.      Tenderness: There is no abdominal tenderness. There is no guarding.   Musculoskeletal:         General: No swelling or tenderness.   Skin:     General: Skin is warm.      Coloration: Skin is not jaundiced.   Neurological:      General: No focal deficit present.      Mental Status: He is easily aroused. He is disoriented.   Psychiatric:         Mood and Affect: Mood normal.         Speech: Speech is delayed.         Behavior: Behavior is slowed. Behavior is not aggressive. Behavior is cooperative.         Fluids  No intake or output data in the 24 hours ending 08/22/20 1320    Laboratory                        Imaging  DX-CHEST-LIMITED (1 VIEW)   Final Result      1.  Bibasilar interstitial infiltrate.      2.  Cardiomegaly.      CT-CTA NECK WITH & W/O-POST PROCESSING   Final Result      1.  Atherosclerotic plaque involving the left carotid bifurcation which results in less than 50% diameter narrowing.      2.  Atherosclerotic plaque involving the right carotid bifurcation which results in 50% diameter narrowing.      3.  Patent vertebral arteries.      CT-CTA HEAD WITH & W/O-POST PROCESS   Final Result      CT angiogram of the [place] within normal limits.      CT-CEREBRAL PERFUSION ANALYSIS   Final Result      1.  Cerebral blood flow less than 30% likely representing completed infarct = 0 mL.      2.  T Max more than 6 seconds likely representing combination of completed infarct and ischemia = 0 mL.      3.  Mismatched volume likely representing ischemic brain/penumbra = None      4.  Please note that the cerebral perfusion was performed on  the limited brain tissue around the basal ganglia region. Infarct/ischemia outside the CT perfusion sections can be missed in this study.      CT-HEAD W/O   Final Result      1.  No evidence of acute intracranial process.      2.  Cerebral atrophy as well as periventricular chronic small vessel ischemic change.      MR-BRAIN-WITH & W/O   Final Result      1.  No acute abnormality.   2.  Moderate cerebral atrophy.   3.  Moderate chronic microvascular ischemic disease.   4.  There is no evidence of vasogenic edema. There is focal encephalomalacia in the right frontal lobe likely secondary to the previous brain insult.      EC-ECHOCARDIOGRAM COMPLETE W/O CONT   Final Result      CT-CHEST (THORAX) WITH   Final Result         1.  Bilateral peripheral reticular pulmonary opacities, appearance suggests component of fibrosis.   2.  Bilateral dependent atelectasis is seen.   3.  Left clavicular fracture, appears likely subacute.   4.  Atherosclerosis and atherosclerotic coronary artery disease      Fleischner Society pulmonary nodule recommendations:         CT-HEAD W/O   Final Result         1.  Subtle area of low-density in the right parieto-occipital subcortical white matter, could represent vasogenic edema. Recommend further characterization with contrast-enhanced CT head or MRI to exclude intracranial mass lesion.   2.  Nonspecific white matter changes, commonly associated with small vessel ischemic disease.  Associated mild cerebral atrophy is noted.   3.  Atherosclerosis.      DX-SHOULDER 2+ LEFT   Final Result         1.  No acute traumatic bony injury.   2.  Degenerative changes of the acromioclavicular and glenohumeral joints.              Assessment/Plan  Dementia with behavioral disturbance (HCC)- (present on admission)  Assessment & Plan  Unable to care for himself.   - psych consulted and recs depakote 250 mg tid along with seroquel  - Depakote level was therapeutic on 8/11  - psychiatry following, appreciate  recs  - titrate medication as need for agitation and impulsivity    Aspiration pneumonia (HCC)- (present on admission)  Assessment & Plan  Likely secondary to aspiration event.  - blood cultures: NGTD  - s/p abx    Clavicular fracture- (present on admission)  Assessment & Plan  Subacute clavicular fracture on CT chest.  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   - case was discussed with Orthopedic surgery  - supportive care  - palliative care following, continue with comfort care measures  - hospice referral placed    Dysphagia- (present on admission)  Assessment & Plan  Patient is noted to have gurgling sound when eating/drinking.   - comfort care  - accepting risk and can eat as tolerated    Macrocytic anemia- (present on admission)  Assessment & Plan  2/2 vitamin b12 deficiency   -s/p IM injection    Vitamin B12 deficiency- (present on admission)  Assessment & Plan  B12 improved after repletion    Advance care planning- (present on admission)  Assessment & Plan  - Palliative care following  - continue comfort care measures    Vasogenic edema (HCC)- (present on admission)  Assessment & Plan  Suspected vasogenic edema in CT, MRI with focal encephalomalacia in the right frontal lobe and noted no edema noted     Plan  Continue with conservative treatment  Patient lacks capacity for medical, financial and general decision-making  See orders  Medically complex high risk  Discharge transfer apparently pending for Monday.    VTE prophylaxis: lovenox  I have performed a physical exam and reviewed and updated ROS and Plan today . In review of yesterday's note , there are no changes except as documented above.        Please note that this dictation was created using voice recognition software. I have made every reasonable attempt to correct obvious errors, but I expect that  there are errors of grammar and possibly context that I did not discover before finalizing the note.

## 2020-08-22 NOTE — PROGRESS NOTES
Received report from night RN. Pt is on comfort care measures. Pt is resting in bed with his wife at his bedside. Pt denies c/o pain. Tele sitter at bedside for safety. Bed alarm on, call light in reach, bed in low position, hourly rounding in place.

## 2020-08-22 NOTE — CARE PLAN
Problem: Mobility  Goal: Risk for activity intolerance will decrease  Outcome: PROGRESSING AS EXPECTED   Patient ambulated with assistance of 1 with FWW to hallway.  Problem: Skin Integrity  Goal: Risk for impaired skin integrity will decrease  Outcome: PROGRESSING AS EXPECTED   Patient turns self side to side. Scheduled nystatin cream given.

## 2020-08-23 NOTE — PROGRESS NOTES
Received report from AYE Garcia. Assumed care at 1900. Pt is A&O to self at this time, patient is impulsive this evening, trying to get OOB. Tele sitter at bedside. Patient takes medication with pudding. Denies pain, nausea/vomiting. Pt has no IV access, MD aware. Comfort care measures in place, wafffle overlay in place, dong care done with incontinence episodes. Patient turns self side to side. Bed alarm is on. Assessed and discussed plan of care. Bed in lowest position, call light within reach, educated patient to call for assistance, non skid socks on, rounding in place.

## 2020-08-23 NOTE — PALLIATIVE CARE
Palliative Care follow-up  Met with pts friend and DPOA, Kisha. Spoke about upcoming move to [facility]. Went over POLST for DC. New POLST completed for DNR DNI with CC no TFs.      Kisha provided update that [facility] is requesting proof of negative COVID-19 testing, let her know I will update the MD.     POLST signed by Kisha and placed on pts hard chart for MD review and signature.      Updated: Dr Marin.      Plan: DC to [facility] with [facility] support once all arrangements can be completed.      Thank you for allowing Palliative Care to support this patient and family. Contact [phone] for additional assistance, change in patient status, or with any questions/concerns.

## 2020-08-23 NOTE — PROGRESS NOTES
Received report from night RN. Pt is on comfort care, he is resting comfortably in bed. Pt denies c/o pain. Pt does not want to eat any food, he has a poor appetite. Last BM 8/20 that is documented. Tele sitter is at bedside for patient safety. Bed alarm on, call light in reach, bed in low position, hourly rounding in place.

## 2020-08-23 NOTE — PROGRESS NOTES
[facility] Medicine Daily Progress Note    Date of Service  8/23/2020    Chief Complaint  88 y.o. male admitted 8/3/2020 with   Chief Complaint   Patient presents with   • GLF     pt had GLF 1 week ago unknown LOC; c/o L sided chest wall pain and L arm and shoulder pain; blood glucose 170 per EMS         Hospital Course  This is a 88-year-old male with past medical history significant for hyperlipidemia, hypertension, dementia  presented to ER on 8/4/2020 after he was noted to  Have left sided shoulder pain and chest wall pain.  It is noted that patient had a ground-level fall 1 week ago, complaint of left-sided chest wall pain, left arm and shoulder pain.  He reports having said a ground-level fall, denied any dizziness.     Upon presentation in ER, he is noted to have macrocytosis with MCV of 106 , mildly elevated troponin at 38.  CT chest abdomen showed left clavicular fracture appears likely subacute.  ER.  Discussed with orthopedic surgery Dr Shields, appreciated his input.     CT head showed subtle area of low density in the right parieto-occipital subcortical white matter could represent vasogenic edema, MRI of brain  Showed encephalomalacia. Patient didn't have any neurological deficit. Considering macrocytosis vitamin B12 was obtained noted to be low, will provide vitamin B12 thousand MCG p.o. daily; mildly elevated TSH at 7s , will start synthroid 25 mcg po qd.     He is levated troponin at 38-->28-->22 ; EKG  Didn't show any ST and T wave changes; Echo showed  EF &0% and Moderate AS.  On 8/9/2020, patient noted to be confused agitated with facial droop, CT/CTA chest along with CT perfusion scan--> no acute ischemic CVA noted, reviewed records demonstrate that likely etiology is infectious.  Patient is noted to have elevated WBC at 13, noted to have gurgling sound chest x-ray consistent with pneumonia, patient was started on aspiration precaution, started on Rocephin plus doxycycline and was transferred to  telemetry for close monitoring.  Speech evaluated and that was started as per speech therapy recommendation.  Palliative continue to follow the patient, after next visit discussion with the palliative team with patient's power of , plan is to send patient and his wife to group home on hospice.  Hospice referral has been placed.  At this time his antibiotics were changed to oral antibiotics including Augmentin and doxycycline.    Interval Problem Update  Patient seen and examined today.    Patient tolerating treatment and therapies.  All Data, Medication data reviewed.  Case discussed with nursing as available.  Plan of Care reviewed with patient and notified of changes.  8/18 the patient visited with his wife, he denies pain, continues to be confused  8/19 the patient continues to be confused, he does not appear agitated, he is alert and oriented x1-2, he cannot tell me what happened, why he is in the hospital and what the plan is.  8/20 the patient is resting, no current physical complaints, transfer to medical unit pending  8/21 the patient is resting, no acute overnight events, the patient is incontinent of bowel and bladder, poor p.o. intake but takes oral nutrition  8/22 the patient is resting, no distress, no discomfort noted  8/23 the patient is awake, seems content, denies pain, no distress  Consultants/Specialty  Neurology  Palliative  Psychiatry    Code Status  Comfort Care/DNR    Disposition  TBD, plan to transfer to [facility] together with his wife on Monday hopefully    Review of Systems  Limited 2/2 patient mentation.      Physical Exam       Physical Exam  Vitals signs reviewed.   Constitutional:       General: He is sleeping. He is not in acute distress.     Appearance: Normal appearance. He is not diaphoretic.      Comments: Confused, but interactive   HENT:      Head: Normocephalic.   Eyes:      General:         Right eye: No discharge.         Left eye: No discharge.      Extraocular  Movements: Extraocular movements intact.   Neck:      Musculoskeletal: Normal range of motion. No neck rigidity.   Cardiovascular:      Rate and Rhythm: Normal rate and regular rhythm.      Heart sounds: Murmur present.   Pulmonary:      Effort: Pulmonary effort is normal. No respiratory distress.      Breath sounds: Rales present.   Abdominal:      General: There is no distension.      Palpations: Abdomen is soft.      Tenderness: There is no abdominal tenderness. There is no guarding.   Musculoskeletal:         General: No swelling or tenderness.   Skin:     General: Skin is warm.      Coloration: Skin is not jaundiced.   Neurological:      General: No focal deficit present.      Mental Status: He is easily aroused. He is disoriented.   Psychiatric:         Mood and Affect: Mood normal.         Speech: Speech is delayed.         Behavior: Behavior is slowed. Behavior is not aggressive. Behavior is cooperative.         Fluids  No intake or output data in the 24 hours ending 08/23/20 1242    Laboratory                        Imaging  DX-CHEST-LIMITED (1 VIEW)   Final Result      1.  Bibasilar interstitial infiltrate.      2.  Cardiomegaly.      CT-CTA NECK WITH & W/O-POST PROCESSING   Final Result      1.  Atherosclerotic plaque involving the left carotid bifurcation which results in less than 50% diameter narrowing.      2.  Atherosclerotic plaque involving the right carotid bifurcation which results in 50% diameter narrowing.      3.  Patent vertebral arteries.      CT-CTA HEAD WITH & W/O-POST PROCESS   Final Result      CT angiogram of the [place] within normal limits.      CT-CEREBRAL PERFUSION ANALYSIS   Final Result      1.  Cerebral blood flow less than 30% likely representing completed infarct = 0 mL.      2.  T Max more than 6 seconds likely representing combination of completed infarct and ischemia = 0 mL.      3.  Mismatched volume likely representing ischemic brain/penumbra = None      4.   Please note that the cerebral perfusion was performed on the limited brain tissue around the basal ganglia region. Infarct/ischemia outside the CT perfusion sections can be missed in this study.      CT-HEAD W/O   Final Result      1.  No evidence of acute intracranial process.      2.  Cerebral atrophy as well as periventricular chronic small vessel ischemic change.      MR-BRAIN-WITH & W/O   Final Result      1.  No acute abnormality.   2.  Moderate cerebral atrophy.   3.  Moderate chronic microvascular ischemic disease.   4.  There is no evidence of vasogenic edema. There is focal encephalomalacia in the right frontal lobe likely secondary to the previous brain insult.      EC-ECHOCARDIOGRAM COMPLETE W/O CONT   Final Result      CT-CHEST (THORAX) WITH   Final Result         1.  Bilateral peripheral reticular pulmonary opacities, appearance suggests component of fibrosis.   2.  Bilateral dependent atelectasis is seen.   3.  Left clavicular fracture, appears likely subacute.   4.  Atherosclerosis and atherosclerotic coronary artery disease      Fleischner Society pulmonary nodule recommendations:         CT-HEAD W/O   Final Result         1.  Subtle area of low-density in the right parieto-occipital subcortical white matter, could represent vasogenic edema. Recommend further characterization with contrast-enhanced CT head or MRI to exclude intracranial mass lesion.   2.  Nonspecific white matter changes, commonly associated with small vessel ischemic disease.  Associated mild cerebral atrophy is noted.   3.  Atherosclerosis.      DX-SHOULDER 2+ LEFT   Final Result         1.  No acute traumatic bony injury.   2.  Degenerative changes of the acromioclavicular and glenohumeral joints.              Assessment/Plan  Dementia with behavioral disturbance (HCC)- (present on admission)  Assessment & Plan  Unable to care for himself.   - psych consulted and recs depakote 250 mg tid along with seroquel  - Depakote level was  therapeutic on 8/11  - psychiatry following, appreciate recs  - titrate medication as need for agitation and impulsivity    Aspiration pneumonia (HCC)- (present on admission)  Assessment & Plan  Likely secondary to aspiration event.  - blood cultures: NGTD  - s/p abx    Clavicular fracture- (present on admission)  Assessment & Plan  Subacute clavicular fracture on CT chest.  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   - case was discussed with Orthopedic surgery  - supportive care  - palliative care following, continue with comfort care measures  - hospice referral placed    Dysphagia- (present on admission)  Assessment & Plan  Patient is noted to have gurgling sound when eating/drinking.   - comfort care  - accepting risk and can eat as tolerated    Macrocytic anemia- (present on admission)  Assessment & Plan  2/2 vitamin b12 deficiency   -s/p IM injection    Vitamin B12 deficiency- (present on admission)  Assessment & Plan  B12 improved after repletion    Advance care planning- (present on admission)  Assessment & Plan  - Palliative care following  - continue comfort care measures    Vasogenic edema (HCC)- (present on admission)  Assessment & Plan  Suspected vasogenic edema in CT, MRI with focal encephalomalacia in the right frontal lobe and noted no edema noted     Plan  Continue with conservative treatment  Patient lacks capacity for medical, financial and general decision-making  See orders  Medically complex high risk  Discharge transfer apparently pending for Monday.    VTE prophylaxis: lovenox  I have performed a physical exam and reviewed and updated ROS and Plan today . In review of yesterday's note , there are no changes except as documented above.        Please note that this dictation was created using voice recognition software. I have made every reasonable  attempt to correct obvious errors, but I expect that there are errors of grammar and possibly context that I did not discover before finalizing the note.

## 2020-08-23 NOTE — CARE PLAN
Problem: Medication  Goal: Compliance with prescribed medication will improve  Outcome: PROGRESSING AS EXPECTED  Intervention: Educate patient and significant other/support system medication rationale and regimen  Note: Patient has been compliant with his medication regimen today  Reinforced education needed due to patient's dementia     Problem: Skin Integrity  Goal: Risk for impaired skin integrity will decrease  Outcome: PROGRESSING AS EXPECTED  Intervention: Implement precautions to protect skin integrity in collaboration with the interdisciplinary team  Note: Pt with no pressure ulcers  Antifungal cream in use on buttocks per order

## 2020-08-24 PROBLEM — G93.6 VASOGENIC EDEMA (HCC): Status: RESOLVED | Noted: 2020-01-01 | Resolved: 2020-01-01

## 2020-08-24 PROBLEM — J69.0 ASPIRATION PNEUMONIA (HCC): Status: RESOLVED | Noted: 2020-01-01 | Resolved: 2020-01-01

## 2020-08-24 PROBLEM — R52 INTRACTABLE PAIN: Status: RESOLVED | Noted: 2020-01-01 | Resolved: 2020-01-01

## 2020-08-24 PROBLEM — R13.10 DYSPHAGIA: Status: RESOLVED | Noted: 2020-01-01 | Resolved: 2020-01-01

## 2020-08-24 NOTE — PROGRESS NOTES
0710- Bedside report received from AYE Hoffman. Patient sleeping  In bed. No s/s of distress at this time.    0800- Patient refusing assessment.    0930- Patient still refusing assessment and lidocaine patch.

## 2020-08-24 NOTE — DISCHARGE PLANNING
Anticipated Discharge Disposition: [facility]    Action: COVID results faxed to Lexie at [facility]. Pt confirmed to discharge to [facility] on 8/24/2020; transportation arrangements pending.  Spoke with Mariam from [facility].  Mariam would like pt discharged by 9 am; would like to be notified when transport time confirmed. MD notified. Call placed to DPOA to notify her of anticipated discharge time, voice message left.     Transportation request sent to [facility].     Barriers to Discharge: Transportation coordination pending.     Plan: Follow up with DPOA to confirm discharge plan.

## 2020-08-24 NOTE — CARE PLAN
Problem: Safety  Goal: Will remain free from injury  Outcome: PROGRESSING AS EXPECTED  Goal: Will remain free from falls  Outcome: PROGRESSING AS EXPECTED     Problem: Knowledge Deficit  Goal: Knowledge of disease process/condition, treatment plan, diagnostic tests, and medications will improve  Outcome: PROGRESSING SLOWER THAN EXPECTED  Goal: Knowledge of the prescribed therapeutic regimen will improve  Outcome: PROGRESSING SLOWER THAN EXPECTED     Patient only oriented to himself, disoriented on place, time and situation. Patient oriented appropriately. Educated on safety and fall precautions. Call light within reach. Bed alarm on.

## 2020-08-24 NOTE — DISCHARGE PLANNING
Received Transport Form at 1610  Spoke to Joe at [employer]  Transport is scheduled for tomorrow at 1030 going to [facility] in [place].

## 2020-08-24 NOTE — PROGRESS NOTES
[facility] Medicine Daily Progress Note    Date of Service  8/24/2020    Chief Complaint  88 y.o. male admitted 8/3/2020 with   Chief Complaint   Patient presents with   • GLF     pt had GLF 1 week ago unknown LOC; c/o L sided chest wall pain and L arm and shoulder pain; blood glucose 170 per EMS         Hospital Course  This is a 88-year-old male with past medical history significant for hyperlipidemia, hypertension, dementia  presented to ER on 8/4/2020 after he was noted to  Have left sided shoulder pain and chest wall pain.  It is noted that patient had a ground-level fall 1 week ago, complaint of left-sided chest wall pain, left arm and shoulder pain.  He reports having said a ground-level fall, denied any dizziness.     Upon presentation in ER, he is noted to have macrocytosis with MCV of 106 , mildly elevated troponin at 38.  CT chest abdomen showed left clavicular fracture appears likely subacute.  ER.  Discussed with orthopedic surgery Dr Shields, appreciated his input.     CT head showed subtle area of low density in the right parieto-occipital subcortical white matter could represent vasogenic edema, MRI of brain  Showed encephalomalacia. Patient didn't have any neurological deficit. Considering macrocytosis vitamin B12 was obtained noted to be low, will provide vitamin B12 thousand MCG p.o. daily; mildly elevated TSH at 7s , on low-dose Synthroid     He is levated troponin at 38-->28-->22 ; EKG  Didn't show any ST and T wave changes; Echo showed  EF &0% and Moderate AS.  On 8/9/2020, patient noted to be confused agitated with facial droop, CT/CTA chest along with CT perfusion scan--> no acute ischemic CVA noted, reviewed records demonstrate that likely etiology is infectious.  Patient is noted to have elevated WBC at 13, noted to have gurgling sound chest x-ray consistent with pneumonia, patient was started on aspiration precaution, started on Rocephin plus doxycycline and was transferred to telemetry for  close monitoring.  Speech evaluated and that was started as per speech therapy recommendation.  Palliative continue to follow the patient, after next visit discussion with the palliative team with patient's power of , plan is to send patient and his wife to group home on hospice.  Hospice has accepted the patient    Interval Problem Update  Patient seen and examined today.    Patient tolerating treatment and therapies.  All Data, Medication data reviewed.  Case discussed with nursing as available.  Plan of Care reviewed with patient and notified of changes.  8/18 the patient visited with his wife, he denies pain, continues to be confused  8/19 the patient continues to be confused, he does not appear agitated, he is alert and oriented x1-2, he cannot tell me what happened, why he is in the hospital and what the plan is.  8/20 the patient is resting, no current physical complaints, transfer to medical unit pending  8/21 the patient is resting, no acute overnight events, the patient is incontinent of bowel and bladder, poor p.o. intake but takes oral nutrition  8/22 the patient is resting, no distress, no discomfort noted  8/23 the patient is awake, seems content, denies pain, no distress  8/24 the patient is resting, no discomfort, discharge planning pending COVID 19 retest  Consultants/Specialty  Neurology  Palliative  Psychiatry    Code Status  Comfort Care/DNR    Disposition  TBD, plan to transfer to [facility] together with his wife on Monday/Tuesday    Review of Systems  Limited 2/2 patient mentation.      Physical Exam  Temp:  [36.8 °C (98.2 °F)] 36.8 °C (98.2 °F)  Pulse:  [87] 87  Resp:  [16] 16  BP: (121)/(74) 121/74    Physical Exam  Vitals signs reviewed.   Constitutional:       General: He is sleeping. He is not in acute distress.     Appearance: Normal appearance. He is not diaphoretic.      Comments: Confused, but interactive   HENT:      Head: Normocephalic.   Eyes:      General:          Right eye: No discharge.         Left eye: No discharge.      Extraocular Movements: Extraocular movements intact.   Neck:      Musculoskeletal: Normal range of motion. No neck rigidity.   Cardiovascular:      Rate and Rhythm: Normal rate and regular rhythm.      Heart sounds: Murmur present.   Pulmonary:      Effort: Pulmonary effort is normal. No respiratory distress.      Breath sounds: Rales present.   Abdominal:      General: There is no distension.      Palpations: Abdomen is soft.      Tenderness: There is no abdominal tenderness. There is no guarding.   Musculoskeletal:         General: No swelling or tenderness.   Skin:     General: Skin is warm.      Coloration: Skin is not jaundiced.   Neurological:      General: No focal deficit present.      Mental Status: He is easily aroused. He is disoriented.   Psychiatric:         Mood and Affect: Mood normal.         Speech: Speech is delayed.         Behavior: Behavior is slowed. Behavior is not aggressive. Behavior is cooperative.         Fluids    Intake/Output Summary (Last 24 hours) at 8/24/2020 1403  Last data filed at 8/24/2020 1402  Gross per 24 hour   Intake 450 ml   Output 205 ml   Net 245 ml       Laboratory                        Imaging  DX-CHEST-LIMITED (1 VIEW)   Final Result      1.  Bibasilar interstitial infiltrate.      2.  Cardiomegaly.      CT-CTA NECK WITH & W/O-POST PROCESSING   Final Result      1.  Atherosclerotic plaque involving the left carotid bifurcation which results in less than 50% diameter narrowing.      2.  Atherosclerotic plaque involving the right carotid bifurcation which results in 50% diameter narrowing.      3.  Patent vertebral arteries.      CT-CTA HEAD WITH & W/O-POST PROCESS   Final Result      CT angiogram of the [place] within normal limits.      CT-CEREBRAL PERFUSION ANALYSIS   Final Result      1.  Cerebral blood flow less than 30% likely representing completed infarct = 0 mL.      2.  T Max more than 6  seconds likely representing combination of completed infarct and ischemia = 0 mL.      3.  Mismatched volume likely representing ischemic brain/penumbra = None      4.  Please note that the cerebral perfusion was performed on the limited brain tissue around the basal ganglia region. Infarct/ischemia outside the CT perfusion sections can be missed in this study.      CT-HEAD W/O   Final Result      1.  No evidence of acute intracranial process.      2.  Cerebral atrophy as well as periventricular chronic small vessel ischemic change.      MR-BRAIN-WITH & W/O   Final Result      1.  No acute abnormality.   2.  Moderate cerebral atrophy.   3.  Moderate chronic microvascular ischemic disease.   4.  There is no evidence of vasogenic edema. There is focal encephalomalacia in the right frontal lobe likely secondary to the previous brain insult.      EC-ECHOCARDIOGRAM COMPLETE W/O CONT   Final Result      CT-CHEST (THORAX) WITH   Final Result         1.  Bilateral peripheral reticular pulmonary opacities, appearance suggests component of fibrosis.   2.  Bilateral dependent atelectasis is seen.   3.  Left clavicular fracture, appears likely subacute.   4.  Atherosclerosis and atherosclerotic coronary artery disease      Fleischner Society pulmonary nodule recommendations:         CT-HEAD W/O   Final Result         1.  Subtle area of low-density in the right parieto-occipital subcortical white matter, could represent vasogenic edema. Recommend further characterization with contrast-enhanced CT head or MRI to exclude intracranial mass lesion.   2.  Nonspecific white matter changes, commonly associated with small vessel ischemic disease.  Associated mild cerebral atrophy is noted.   3.  Atherosclerosis.      DX-SHOULDER 2+ LEFT   Final Result         1.  No acute traumatic bony injury.   2.  Degenerative changes of the acromioclavicular and glenohumeral joints.              Assessment/Plan  Dementia with behavioral disturbance  (HCC)- (present on admission)  Assessment & Plan  Unable to care for himself.   - psych consulted and recs depakote 250 mg tid along with seroquel  - Depakote level was therapeutic on 8/11  - psychiatry following, appreciate recs  - titrate medication as need for agitation and impulsivity    Aspiration pneumonia (HCC)- (present on admission)  Assessment & Plan  Likely secondary to aspiration event.  - blood cultures: NGTD  - s/p abx    Clavicular fracture- (present on admission)  Assessment & Plan  Subacute clavicular fracture on CT chest.  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   - case was discussed with Orthopedic surgery  - supportive care  - palliative care following, continue with comfort care measures  - hospice referral placed    Dysphagia- (present on admission)  Assessment & Plan  Patient is noted to have gurgling sound when eating/drinking.   - comfort care  - accepting risk and can eat as tolerated    Macrocytic anemia- (present on admission)  Assessment & Plan  2/2 vitamin b12 deficiency   -s/p IM injection    Vitamin B12 deficiency- (present on admission)  Assessment & Plan  B12 improved after repletion    Advance care planning- (present on admission)  Assessment & Plan  - Palliative care following  - continue comfort care measures    Vasogenic edema (HCC)- (present on admission)  Assessment & Plan  Suspected vasogenic edema in CT, MRI with focal encephalomalacia in the right frontal lobe and noted no edema noted     Plan  POLST completed, COVID-19 test pending, transfer pending  Continue with conservative treatment  Patient lacks capacity for medical, financial and general decision-making  See orders  Medically complex high risk      VTE prophylaxis: lovenox  I have performed a physical exam and reviewed and updated ROS and Plan today . In review of  yesterday's note , there are no changes except as documented above.        Please note that this dictation was created using voice recognition software. I have made every reasonable attempt to correct obvious errors, but I expect that there are errors of grammar and possibly context that I did not discover before finalizing the note.

## 2020-08-25 NOTE — PROGRESS NOTES
Received bedside report from AYE Noonan. Assumed care of pt [id number]. Pt awake and alert. TeleSitter in place for safety. Communication board updated. Pt reports all needs to be met at this time

## 2020-08-25 NOTE — PROGRESS NOTES
Discharge order received.No PIV.  Printed discharge instructions, POLST and discharge summary given to transport staff. Pt transported to [facility] via [facility]. Copy of staff ID in chart

## 2020-08-25 NOTE — DISCHARGE SUMMARY
Discharge Summary    CHIEF COMPLAINT ON ADMISSION  Chief Complaint   Patient presents with   • GLF     pt had GLF 1 week ago unknown LOC; c/o L sided chest wall pain and L arm and shoulder pain; blood glucose 170 per EMS       Reason for Admission  89 yo man who presented with a ground-level fall approximately a week prior to admission complaining of left-sided chest wall pain left arm pain and shoulder pain the patient was not a operative candidate, the patient on further testing was found to have chronic encephalomalacia likely indicating a prior event. He was found with vitamin B12 deficiency, the patient continued to be confused, alert and oriented x1 primarily, he was treated for a transient likely aspiration pneumonia.  The patient has a power of , Kisha, who assisting decision-making. In light of the patient's advanced age, poor mental function and repeat falls and debility, decision was made to place the patient on a primary symptom-based regimen, comfort care regimen.  Arrangements were made to transfer the patient to assisted living on comfort care measures with hospice evaluating, [facility] involved.  The patient had some behavioral changes which were treated successfully with the assistance from psychiatry.  Patient this time is able to ambulate short distances with help.  Is able to take oral nutrition.  At this time he is stable to transfer to the assisted living facility with ongoing comfort based treatment.    CODE STATUS  Comfort Care/DNR    HPI & HOSPITAL COURSE  Refer to above discussion       Therefore, he is discharged in guarded and stable condition to hospice.  Assisted facility level with hospice and comfort care    The patient met 2-midnight criteria for an inpatient stay at the time of discharge.      FOLLOW UP ITEMS POST DISCHARGE  Hospice with further arrangements, [facility]    DISCHARGE DIAGNOSES  Active Problems:    Dementia with behavioral disturbance (HCC) POA:  Yes    Clavicular fracture POA: Yes    Vitamin B12 deficiency POA: Yes    Macrocytic anemia POA: Yes    Advance care planning POA: Yes  Resolved Problems:    Intractable pain POA: Yes    Aspiration pneumonia (HCC) POA: Yes    Fall POA: Yes    Hyponatremia POA: Yes    Hypokalemia POA: Yes    Dysphagia POA: Yes    Vasogenic edema (HCC) POA: Yes      FOLLOW UP  [facility]    Ant Byrnes M.D.  [address] [address]  [phone]    In 1 week      [facility], Emergency Dept  [address] [id number] [phone]    If symptoms worsen    Burt Matias M.D.  [address] [facility] [address]  [phone]    In 2 weeks  follow up clavicle fracture      MEDICATIONS ON DISCHARGE     Medication List      START taking these medications      Instructions   divalproex 125 MG Csdr  Commonly known as: DEPAKOTE SPRINKLE   Take 2 Caps by mouth every 8 hours.  Dose: 250 mg     lidocaine 5 % Ptch  Start taking on: August 25, 2020  Commonly known as: LIDODERM   Apply 1 Patch to skin as directed every 24 hours.  Dose: 1 Patch     nystatin [id number] UNIT/GM Crea topical cream  Start taking on: August 25, 2020  Commonly known as: MYCOSTATIN   Apply 1 g to affected area(s) 2 Times a Day.  Dose: 1 Application     QUEtiapine 25 MG Tabs  Start taking on: August 25, 2020  Commonly known as: SEROQUEL   Take 1 Tab by mouth 2 Times a Day.  Dose: 25 mg        CONTINUE taking these medications      Instructions   omeprazole 20 MG delayed-release capsule  Commonly known as: PRILOSEC   Take 20 mg by mouth every day.  Dose: 20 mg        STOP taking these medications    aspirin 81 MG tablet     atorvastatin 10 MG Tabs  Commonly known as: Lipitor     folic acid 400 MCG tablet  Commonly known as: FOLVITE     metoprolol SR 25 MG Tb24  Commonly known as: TOPROL XL     OCUVITE-LUTEIN PO     oxyCODONE-acetaminophen 5-325 MG Tabs  Commonly known as: Percocet     Vitamin D 50 MCG (2000 UT) Caps             Allergies  Allergies   Allergen Reactions   • Demerol Anxiety       DIET  Orders Placed This Encounter   Procedures   • Diet Order Regular     Standing Status:   Standing     Number of Occurrences:   1     Order Specific Question:   Diet:     Answer:   Regular [1]     Order Specific Question:   Texture Modifier     Answer:   Level 6 - Soft & Bite Sized (Dysphagia 3)     Order Specific Question:   Liquid level     Answer:   Level 2 - Mildly Thick     Order Specific Question:   Miscellaneous modifications:     Answer:   SLP - Deliver to Nursing Station [22]       ACTIVITY  As tolerated.  Weight bearing as tolerated    LINES, DRAINS, AND WOUNDS  This is an automated list. Peripheral IVs will be removed prior to discharge.       Wound 08/20/20 Other (comment) Buttocks Posterior Bilateral red but blanching (Active)   Wound Image   08/20/20 0900   Site Assessment [place] 08/23/20 2000   Periwound Assessment Red 08/23/20 2000   Wound Cleansing Foam Cleanser/Washcloth 08/23/20 2000   Periwound Protectant Antifungal Therapy 08/23/20 2000   Dressing Cleansing/Solutions Not Applicable 08/23/20 2000   Dressing Options Open to Air 08/23/20 2000                  MENTAL STATUS ON TRANSFER  Level of Consciousness: Confused  Orientation : Disoriented to Event, Disoriented to Place, Disoriented to Time  Speech: Speech Clear    CONSULTATIONS  Orthopedic surgery  Psychiatry  Palliative care  Hospice    PROCEDURES  None    LABORATORY  Lab Results   Component Value Date    SODIUM 139 08/12/2020    POTASSIUM 3.8 08/12/2020    CHLORIDE 106 08/12/2020    CO2 22 08/12/2020    GLUCOSE 120 (H) 08/12/2020    BUN 9 08/12/2020    CREATININE 0.63 08/12/2020    CREATININE 1.10 11/30/2012        Lab Results   Component Value Date    WBC 8.8 08/11/2020    HEMOGLOBIN 15.7 08/11/2020    HEMATOCRIT 45.0 08/11/2020    PLATELETCT 184 08/11/2020      For for further details refer to the epic computer system paper chart  A POLST was completed    Total  time of the discharge process exceeds 32 minutes.

## 2020-08-25 NOTE — DISCHARGE INSTRUCTIONS
Discharge Instructions    Discharged to other by medical transportation with escort. Discharged via wheelchair, hospital escort: Yes.  Special equipment needed: Not Applicable    Be sure to schedule a follow-up appointment with your primary care doctor or any specialists as instructed.     Discharge Plan:   Diet Plan: Discussed  Activity Level: Discussed  Confirmed Follow up Appointment: (going to [facility] will make appt)  Confirmed Symptoms Management: Discussed  Medication Reconciliation Updated: Yes    I understand that a diet low in cholesterol, fat, and sodium is recommended for good health. Unless I have been given specific instructions below for another diet, I accept this instruction as my diet prescription.   Other diet: Soft & Bite Sized (Dysphagia 3) Liquid level Level 2 - Mildly Thick        Special Instructions: None    · Is patient discharged on Warfarin / Coumadin?   No       End-of-Life Care  End-of-life care is the physical, emotional, mental, and spiritual care a person receives during the last days, weeks, or months of life. Care at the end of a person's life requires a team of professionals, which may include:  · Health care providers.  · A .  · A spiritual adviser.  The goal of end-of-life care is to give the patient the highest quality of life possible at the end of life.  What are the different types of end-of-life care?  There are different options for receiving care at the end of your life.  Palliative care  This type of care can be delivered at the same time as other treatments. The goal is to manage your symptoms and improve your quality of life, which may include:  · Control of pain and other symptoms.  · Family support.  · Spiritual support.  · Emotional and social support.  · Comfort.  You may need palliative care for months or years to manage a long-term (chronic) disease or condition.  Hospice care  This is a kind of end-of-life care that may be  recommended by your health care providers. Hospice care is usually offered when a person is expected to live for six months or less.  Hospice care is designed to provide people who are terminally ill and their families with medical, spiritual, and psychological support. The aim is to improve your quality of life by keeping you as comfortable as possible in the final stages of life.  Comfort care  This type of care is designed to help meet your basic needs and maintain your overall comfort at the end of your life. This includes:  · Caring for your skin.  · Making sure that you are breathing well.  · Ensuring that you are eating well.  · Making sure that you get enough rest.  · Making sure that you are at a comfortable body temperature.  A plan for comfort care can also address the mental, emotional, and spiritual issues that may come up at the end of your life.  Where does end-of-life care take place?  End-of-life care can take place wherever you are living, as long as you get the care you need. End-of-life care can happen:  · At your home.  · In a nursing home.  · In a hospital.  You and your loved ones may be able to decide where end-of-life care takes place. This decision depends on:  · Your wishes.  · Your comfort.  · The medical equipment you need.  How do I know when it is time for end-of-life care?  Your health care provider may tell you that treatments can no longer control your illness. You may also decide that you do not want to undergo the treatments that are available. Talk to your health care provider and your loved ones about your end-of-life care options.  If possible, discuss the following topics with your health care provider before you need end-of-life care:  · How much medical treatment you want during end-of-life care.  · Where you would like to live during end-of-life care.  · What kinds of treatments you would like to keep you comfortable.  · Which treatments you would refuse.  · Your dale or  spiritual needs at the end of your life.  · Who will handle practical details, such as your will, finances, and  planning.  You can create legal documents (advance directives) to let your loved ones know your wishes for end-of-life care. Talk to your health care provider or a  about making a living will that explains your medical wishes. You can also have a medical power of . This designates a person to make health decisions for you if you cannot make them yourself.  Summary  · End-of-life care is the physical, emotional, mental, and spiritual care a person receives during the last days, weeks, or months of life.  · The goal of end-of-life care is to give the patient the highest quality of life possible at the end of life.  · There are a number of different options for receiving this care, including palliative care, hospice care, or comfort care.  · Talk to your health care provider and your loved ones about your preferences for end-of-life care. This includes the place to receive care, the kind of care you want to receive, the care you want to decline, your spiritual needs, and your finances.  This information is not intended to replace advice given to you by your health care provider. Make sure you discuss any questions you have with your health care provider.  Document Released: 07/15/2015 Document Revised: 2019 Document Reviewed: 2019  Elsevier Patient Education ©   Inc.      Dementia  Dementia is a condition that affects the way the brain functions. It often affects memory and thinking. Usually, dementia gets worse with time and cannot be reversed (progressive dementia). There are many types of dementia, including:  · Alzheimer's disease. This type is the most common.  · Vascular dementia. This type may happen as the result of a stroke.  · Lewy body dementia. This type may happen to people who have Parkinson's disease.  · Frontotemporal dementia. This type is caused  by damage to nerve cells (neurons) in certain parts of the brain.  Some people may be affected by more than one type of dementia. This is called mixed dementia.  What are the causes?  Dementia is caused by damage to cells in the brain. The area of the brain and the types of cells damaged determine the type of dementia. Usually, this damage is irreversible or cannot be undone. Some examples of irreversible causes include:  · Conditions that affect the blood vessels of the brain, such as diabetes, heart disease, or blood vessel disease.  · Genetic mutations.  In some cases, changes in the brain may be caused by another condition and can be reversed or slowed. Some examples of reversible causes include:  · Injury to the brain.  · Certain medicines.  · Infection, such as meningitis.  · Metabolic problems, such as vitamin B12 deficiency or thyroid disease.  · Pressure on the brain, such as from a tumor or blood clot.  What are the signs or symptoms?  Symptoms of dementia depend on the type of dementia. Common signs of dementia include problems with remembering, thinking, problem solving, decision making, and communicating. These signs develop slowly or get worse with time. This may include:  · Problems remembering things.  · Having trouble taking a bath or putting clothes on.  · Forgetting appointments.  · Forgetting to pay bills.  · Difficulty planning and preparing meals.  · Having trouble speaking.  · Getting lost easily.  How is this diagnosed?  This condition is diagnosed by a specialist (neurologist). It is diagnosed based on the history of your symptoms, your medical history, a physical exam, and tests. Tests may include:  · Tests to evaluate brain function, such as memory tests, cognitive tests, and other tests.  · Lab tests, such as blood or urine tests.  · Imaging tests, such as a CT scan, a PET scan, or an MRI.  · Genetic testing. This may be done if other family members have a diagnosis of certain types of  dementia.  Your health care provider will talk with you and your family, friends, or caregivers about your history and symptoms.  How is this treated?    Treatment for this condition depends on the cause of the dementia. Progressive dementias, such as Alzheimer's disease, cannot be cured, but there may be treatments that help to manage symptoms.  Treatment might involve taking medicines that may help to:  · Control the dementia.  · Slow down the progression of the dementia.  · Manage symptoms.  In some cases, treating the cause of your dementia can improve symptoms, reverse symptoms, or slow down how quickly your dementia becomes worse.  Your health care provider can direct you to support groups, organizations, and other health care providers who can help with decisions about your care.  Follow these instructions at home:  Medicines  · Take over-the-counter and prescription medicines only as told by your health care provider.  · Use a pill organizer or pill reminder to help you manage your medicines.  · Avoid taking medicines that can affect thinking, such as pain medicines or sleeping medicines.  Lifestyle  · Make healthy lifestyle choices.  ? Be physically active as told by your health care provider.  ? Do not use any products that contain nicotine or tobacco, such as cigarettes, e-cigarettes, and chewing tobacco. If you need help quitting, ask your health care provider.  ? Do not drink alcohol.  ? Practice stress-management techniques when you get stressed.  ? Spend time with other people.  · Make sure to get quality sleep. These tips can help you get a good night's rest:  ? Avoid napping during the day.  ? Keep your sleeping area dark and cool.  ? Avoid exercising during the few hours before you go to bed.  ? Avoid caffeine products in the evening.  Eating and drinking  · Drink enough fluid to keep your urine pale yellow.  · Eat a healthy diet.  General instructions    · Work with your health care provider to  determine what you need help with and what your safety needs are.  · Talk with your health care provider about whether it is safe for you to drive.  · If you were given a bracelet that identifies you as a person with memory loss or tracks your location, make sure to wear it at all times.  · Work with your family to make important decisions, such as advance directives, medical power of , or a living will.  · Keep all follow-up visits as told by your health care provider. This is important.  Where to find more information  · Alzheimer's Association: www.alz.org  · National [place] on Aging: www.lorena.nih.gov/alzheimers  · World Health Organization: www.who.int  Contact a health care provider if:  · You have any new or worsening symptoms.  · You have problems with choking or swallowing.  Get help right away if:  · You feel depressed or sad, or feel that you want to harm yourself.  · Your family members become concerned for your safety.  If you ever feel like you may hurt yourself or others, or have thoughts about taking your own life, get help right away. You can go to your nearest emergency department or call:  · Your local emergency services (911 in the [place]  · A suicide crisis helpline, such as the National Suicide Prevention Lifeline at 1-924.652.7060. This is open 24 hours a day.  Summary  · Dementia is a condition that affects the way the brain functions. Dementia often affects memory and thinking.  · Usually, dementia gets worse with time and cannot be reversed (progressive dementia).  · Treatment for this condition depends on the cause of the dementia.  · Work with your health care provider to determine what you need help with and what your safety needs are.  · Your health care provider can direct you to support groups, organizations, and other health care providers who can help with decisions about your care.  This information is not intended to replace advice given to you by your health care  provider. Make sure you discuss any questions you have with your health care provider.  Document Released: 06/13/2002 Document Revised: 03/03/2020 Document Reviewed: 03/03/2020  Elsevier Patient Education © 2020 Elsevier Inc.      Depression / Suicide Risk    As you are discharged from this [facility] facility, it is important to learn how to keep safe from harming yourself.    Recognize the warning signs:  · Abrupt changes in personality, positive or negative- including increase in energy   · Giving away possessions  · Change in eating patterns- significant weight changes-  positive or negative  · Change in sleeping patterns- unable to sleep or sleeping all the time   · Unwillingness or inability to communicate  · Depression  · Unusual sadness, discouragement and loneliness  · Talk of wanting to die  · Neglect of personal appearance   · Rebelliousness- reckless behavior  · Withdrawal from people/activities they love  · Confusion- inability to concentrate     If you or a loved one observes any of these behaviors or has concerns about self-harm, here's what you can do:  · Talk about it- your feelings and reasons for harming yourself  · Remove any means that you might use to hurt yourself (examples: pills, rope, extension cords, firearm)  · Get professional help from the community (Mental Health, Substance Abuse, psychological counseling)  · Do not be alone:Call your Safe Contact- someone whom you trust who will be there for you.  · Call your local CRISIS HOTLINE 364-1570 or 934-262-2925  · Call your local Children's Mobile Crisis Response Team [place] (009) 600-5902 or www.XGIMI  · Call the toll free National Suicide Prevention Hotlines   · National Suicide Prevention Lifeline 268-802-WMOU (7278)  · National Hope Line Network 800-SUICIDE (882-1512)        Please discuss the following findings with your regular doctor:  CT-CHEST (THORAX) WITH   Final Result         1.  Bilateral peripheral  reticular pulmonary opacities, appearance suggests component of fibrosis.   2.  Bilateral dependent atelectasis is seen.   3.  Left clavicular fracture, appears likely subacute.   4.  Atherosclerosis and atherosclerotic coronary artery disease      Fleischner Society pulmonary nodule recommendations:         CT-HEAD W/O   Final Result         1.  Subtle area of low-density in the right parieto-occipital subcortical white matter, could represent vasogenic edema. Recommend further characterization with contrast-enhanced CT head or MRI to exclude intracranial mass lesion.   2.  Nonspecific white matter changes, commonly associated with small vessel ischemic disease.  Associated mild cerebral atrophy is noted.   3.  Atherosclerosis.      DX-SHOULDER 2+ LEFT   Final Result         1.  No acute traumatic bony injury.   2.  Degenerative changes of the acromioclavicular and glenohumeral joints.            Labs Reviewed   CBC WITH DIFFERENTIAL - Abnormal; Notable for the following components:       Result Value    RBC 4.16 (*)     .3 (*)     MCH 37.0 (*)     RDW 51.7 (*)     Lymphocytes 15.00 (*)     Monocytes 15.40 (*)     Monos (Absolute) 1.29 (*)     All other components within normal limits   BASIC METABOLIC PANEL - Abnormal; Notable for the following components:    Glucose 102 (*)     All other components within normal limits   ESTIMATED GFR

## 2023-03-25 NOTE — PROGRESS NOTES
Assumed care of pt at shift change. On RA with no signs of acute distress. Pt is A&Ox2. No complaints of pain. Pt is calm, cheerful and cooperative. Soft restraints present on both wrist and right ankle. Pt restraints will be dc'd per MD Carolina as pt's behavior is now cooperative. POC discussed with pt.   Pt's wife visited him from Oncology Unit accompanied by her RN. All comfort measures in place. Bed locked and in lowest position. Call light by bedside. Hourly rounding in place.    SICU DAILY AM PROGRESS NOTE  ===============================  Interval Events:   -    HPI:  50 year old female with PMHx significant for HTN, hypothyroidism,  30 years ago, breast cancer s/p left mastectomy and chemo in  presenting with epigastric pain and LUQ pain. Found to have necrotizing pancreatitis. Transferred from OS ([place]) for hypotension and tachypnea requiring emergent intubation and multiple pressors. SICU consulted for hemodynamic monitoring and respiratory management    VITAL SIGNS, INS/OUTS (last 24 hours):  --------------------------------------------------------------------------------------  T(C): 37.1 (-23 @ 12:00), Max: 37.1 (-23 @ 12:00)  HR: 108 (-23 @ 12:00) (94 - 122)  BP: 92/80 (24-23 @ 17:30) (77/67 - 94/80)  BP(mean): 84 (-24-23 @ 17:30) (73 - 87)  ABP: 95/48 (-23 @ 12:00) (88/54 - 133/77)  ABP(mean): 65 (-23 @ 12:00) (62 - 97)  RR: 22 (-23 @ 12:00) (15 - 29)  SpO2: 97% (-23 @ 12:00) (93% - 100%)  CI: 3.3 (-23 @ 10:00) (2.1 - 3.3)  CAPILLARY BLOOD GLUCOSE    POCT Blood Glucose.: 130 mg/dL (25 Mar 2023 12:27)  POCT Blood Glucose.: 121 mg/dL (25 Mar 2023 08:22)  POCT Blood Glucose.: 212 mg/dL (25 Mar 2023 06:05)  POCT Blood Glucose.: 84 mg/dL (25 Mar 2023 05:48)  POCT Blood Glucose.: 129 mg/dL (25 Mar 2023 03:58)  POCT Blood Glucose.: 146 mg/dL (25 Mar 2023 03:35)  POCT Blood Glucose.: 56 mg/dL (25 Mar 2023 03:08)  POCT Blood Glucose.: 68 mg/dL (25 Mar 2023 03:06)  POCT Blood Glucose.: 103 mg/dL (25 Mar 2023 01:06)  POCT Blood Glucose.: 101 mg/dL (24 Mar 2023 23:56)  POCT Blood Glucose.: 121 mg/dL (24 Mar 2023 23:00)  POCT Blood Glucose.: 137 mg/dL (24 Mar 2023 22:02)  POCT Blood Glucose.: 159 mg/dL (24 Mar 2023 21:06)  POCT Blood Glucose.: 217 mg/dL (24 Mar 2023 20:05)  POCT Blood Glucose.: 250 mg/dL (24 Mar 2023 19:01)  POCT Blood Glucose.: 248 mg/dL (24 Mar 2023 18:05)  POCT Blood Glucose.: 227 mg/dL (24 Mar 2023 17:05)     @ 07:01  -   @ 07:00  --------------------------------------------------------  IN:    Dexmedetomidine: 102.9 mL    FentaNYL: 93.9 mL    Insulin: 5 mL    Insulin: 4 mL    Insulin: 5 mL    Insulin: 6 mL    IV PiggyBack: 100 mL    Lactated Ringers: 850 mL    Lactated Ringers Bolus: 1000 mL    Norepinephrine: 137.7 mL    Phenylephrine: 154.7 mL    Phenylephrine: 221 mL    Vasopressin: 18 mL    Vasopressin: 18 mL  Total IN: 2716.2 mL    OUT:    Indwelling Catheter - Urethral (mL): 0 mL    Nasogastric/Oral tube (mL): 100 mL    Other (mL): 190 mL  Total OUT: 290 mL    Total NET: 2426.2 mL       @ 07:01  -   @ 14:14  --------------------------------------------------------  IN:    Dexmedetomidine: 29.4 mL    FentaNYL: 35.4 mL    Lactated Ringers: 450 mL    Norepinephrine: 48.6 mL    Vasopressin: 9 mL  Total IN: 572.4 mL    OUT:    Indwelling Catheter - Urethral (mL): 0 mL    Phenylephrine: 0 mL  Total OUT: 0 mL    Total NET: 572.4 mL  --------------------------------------------------------------------------------------    EXAM  NEUROLOGY  Exam: Sedated, arousable    RESPIRATORY  Exam: Lungs clear to auscultation, Normal expansion/effort.   [] Tracheostomy   [x] Intubated  Mechanical Ventilation: AC: 450/5/22/50    CARDIOVASCULAR  Exam: S1, S2.  Regular rate and rhythm     GI/NUTRITION  Exam: Abdomen soft, moderately distended.    Current Diet:  NPO    METABOLIC/FLUIDS/ELECTROLYTES  albumin human  5% IVPB 500 milliLiter(s) IV Intermittent once  dextrose 5% + lactated ringers. 1000 milliLiter(s) IV Continuous <Continuous>  lactated ringers Bolus 1000 milliLiter(s) IV Bolus once  lactated ringers Bolus 2000 milliLiter(s) IV Bolus once  lactated ringers. 1000 milliLiter(s) IV Continuous <Continuous>      HEMATOLOGIC  [x] DVT Prophylaxis: heparin   Injectable 5000 Unit(s) SubCutaneous every 8 hours    Transfusions:	[] PRBC	[] Platelets		[] FFP	[] Cryoprecipitate    INFECTIOUS DISEASE  Antimicrobials/Immunologic Medications:      VASCULAR  Exam: Extremities warm, pink, well-perfused.      MUSCULOSKELETAL  Exam: All extremities moving spontaneously without limitations.     SKIN  Exam: Good skin turgor, no skin breakdown.      LABS  --------------------------------------------------------------------------------------  CBC ( @ 12:30)                              6.8<LL>                         14.38<H>  )----------------(  129<L>     --    % Neutrophils, --    % Lymphocytes, ANC: --                                  21.1<L>  CBC ( @ 08:25)                              8.6<L>                         18.17<H>  )----------------(  159        68.9  % Neutrophils, 20.1  % Lymphocytes, ANC: 12.53<H>                              25.9<L>    BMP ( @ :30)             141     |  103     |  22    		Ca++ --      Ca 7.4<L>             ---------------------------------( 135<H>		Mg 1.80               5.0     |  16<L>   |  2.87<H>			Ph 3.1     BMP (:25)             139     |  101     |  25<H> 		Ca++ --      Ca 7.6<L>             ---------------------------------( 121<H>		Mg --                 4.9     |  14<L>   |  3.12<H>			Ph --        LFTs ( 12:30)      TPro 4.5<L> / Alb 2.8<L> / TBili 2.0<H> / DBili -- / AST -- / ALT 4453<H> / AlkPhos 100  LFTs ( @ 08:25)      TPro 5.4<L> / Alb 2.9<L> / TBili 1.9<H> / DBili -- / AST >7000<H> / ALT 5534<H> / AlkPhos 112    Coags ( @ 08:25)  aPTT 28.2 / INR 2.57<H> / PT 30.1<H>  Coags ( 04:30)  aPTT 28.9 / INR 2.48<H> / PT 29.0<H>      ABG ( @ 12:30)     7.34<L> / 32 / 89 / 17<L> / -7.7<L> / 98.9<H>%     Lactate:     ABG ( @ 08:25)     7.32<L> / 29<L> / 79<L> / 15<L> / -10.1<L> / 97.0%     Lactate:         Urinalysis ( @ 05:30):     Color: Yellow / Appearance: Clear / S.020 / pH: 5.0 / Gluc: 250<!> / Ketones: Trace<!> / Bili: Negative / Urobili: Negative / Protein :100<!> / Nitrites: Negative / Leuk.Est: Trace<!> / RBC: 0-2 / WBC: 6-10<!> / Sq Epi:  / Non Sq Epi: Moderate<!> / Bacteria Many<!>       -> .Blood Blood Culture ( @ 05:30)     NG    NG    No growth to date.    -> Clean Catch Clean Catch (Midstream) Culture ( @ 23:00)     NG    NG    >100,000 CFU/ml Escherichia coli  --------------------------------------------------------------------------------------    IMAGING STUDIES     SICU DAILY AM PROGRESS NOTE  ===============================  Interval Events:   - on CRRT   - continuing to require vaso, levo, venkata  - uptrending lactate  - 4L LR given   - 250cc albumin   - bedside POCUS performed, difficult to visualize IVC  - Gave 6gm Ca gluc, bicarb amp x 2      HPI:  50 year old female with PMHx significant for HTN, hypothyroidism,  30 years ago, breast cancer s/p left mastectomy and chemo in  presenting with epigastric pain and LUQ pain. Found to have necrotizing pancreatitis. Transferred from OSH ([place]) for hypotension and tachypnea requiring emergent intubation and multiple pressors. SICU consulted for hemodynamic monitoring and respiratory management    VITAL SIGNS, INS/OUTS (last 24 hours):  --------------------------------------------------------------------------------------  T(C): 37.1 (23 @ 12:00), Max: 37.1 (23 @ 12:00)  HR: 108 (23 @ 12:00) (94 - 122)  BP: 92/80 (23 @ 17:30) (77/67 - 94/80)  BP(mean): 84 (23 @ 17:30) (73 - 87)  ABP: 95/48 (23 @ 12:00) (88/54 - 133/77)  ABP(mean): 65 (23 @ 12:00) (62 - 97)  RR: 22 (23 @ 12:00) (15 - 29)  SpO2: 97% (23 @ 12:00) (93% - 100%)  CI: 3.3 (23 @ 10:00) (2.1 - 3.3)  CAPILLARY BLOOD GLUCOSE    POCT Blood Glucose.: 130 mg/dL (25 Mar 2023 12:27)  POCT Blood Glucose.: 121 mg/dL (25 Mar 2023 08:22)  POCT Blood Glucose.: 212 mg/dL (25 Mar 2023 06:05)  POCT Blood Glucose.: 84 mg/dL (25 Mar 2023 05:48)  POCT Blood Glucose.: 129 mg/dL (25 Mar 2023 03:58)  POCT Blood Glucose.: 146 mg/dL (25 Mar 2023 03:35)  POCT Blood Glucose.: 56 mg/dL (25 Mar 2023 03:08)  POCT Blood Glucose.: 68 mg/dL (25 Mar 2023 03:06)  POCT Blood Glucose.: 103 mg/dL (25 Mar 2023 01:06)  POCT Blood Glucose.: 101 mg/dL (24 Mar 2023 23:56)  POCT Blood Glucose.: 121 mg/dL (24 Mar 2023 23:00)  POCT Blood Glucose.: 137 mg/dL (24 Mar 2023 22:02)  POCT Blood Glucose.: 159 mg/dL (24 Mar 2023 21:06)  POCT Blood Glucose.: 217 mg/dL (24 Mar 2023 20:05)  POCT Blood Glucose.: 250 mg/dL (24 Mar 2023 19:01)  POCT Blood Glucose.: 248 mg/dL (24 Mar 2023 18:05)  POCT Blood Glucose.: 227 mg/dL (24 Mar 2023 17:05)    24 @ 07:01  -   @ 07:00  --------------------------------------------------------  IN:    Dexmedetomidine: 102.9 mL    FentaNYL: 93.9 mL    Insulin: 5 mL    Insulin: 4 mL    Insulin: 5 mL    Insulin: 6 mL    IV PiggyBack: 100 mL    Lactated Ringers: 850 mL    Lactated Ringers Bolus: 1000 mL    Norepinephrine: 137.7 mL    Phenylephrine: 154.7 mL    Phenylephrine: 221 mL    Vasopressin: 18 mL    Vasopressin: 18 mL  Total IN: 2716.2 mL    OUT:    Indwelling Catheter - Urethral (mL): 0 mL    Nasogastric/Oral tube (mL): 100 mL    Other (mL): 190 mL  Total OUT: 290 mL    Total NET: 2426.2 mL      03-25 @ 07: @ 14:14  --------------------------------------------------------  IN:    Dexmedetomidine: 29.4 mL    FentaNYL: 35.4 mL    Lactated Ringers: 450 mL    Norepinephrine: 48.6 mL    Vasopressin: 9 mL  Total IN: 572.4 mL    OUT:    Indwelling Catheter - Urethral (mL): 0 mL    Phenylephrine: 0 mL  Total OUT: 0 mL    Total NET: 572.4 mL  --------------------------------------------------------------------------------------    EXAM  NEUROLOGY  Exam: Sedated, arousable    RESPIRATORY  Exam: Lungs clear to auscultation, Normal expansion/effort.   [] Tracheostomy   [x] Intubated  Mechanical Ventilation: AC: 450/5/22/50    CARDIOVASCULAR  Exam: S1, S2.  Regular rate and rhythm     GI/NUTRITION  Exam: Abdomen soft, moderately distended.    Current Diet:  NPO    METABOLIC/FLUIDS/ELECTROLYTES  albumin human  5% IVPB 500 milliLiter(s) IV Intermittent once  dextrose 5% + lactated ringers. 1000 milliLiter(s) IV Continuous <Continuous>  lactated ringers Bolus 1000 milliLiter(s) IV Bolus once  lactated ringers Bolus 2000 milliLiter(s) IV Bolus once  lactated ringers. 1000 milliLiter(s) IV Continuous <Continuous>      HEMATOLOGIC  [x] DVT Prophylaxis: heparin   Injectable 5000 Unit(s) SubCutaneous every 8 hours    Transfusions:	[] PRBC	[] Platelets		[] FFP	[] Cryoprecipitate    INFECTIOUS DISEASE  Antimicrobials/Immunologic Medications:      VASCULAR  Exam: Extremities warm, pink, well-perfused.      MUSCULOSKELETAL  Exam: All extremities moving spontaneously without limitations.     SKIN  Exam: Good skin turgor, no skin breakdown.      LABS  --------------------------------------------------------------------------------------  CBC ( @ 12:30)                              6.8<LL>                         14.38<H>  )----------------(  129<L>     --    % Neutrophils, --    % Lymphocytes, ANC: --                                  21.1<L>  CBC ( @ :25)                              8.6<L>                         18.17<H>  )----------------(  159        68.9  % Neutrophils, 20.1  % Lymphocytes, ANC: 12.53<H>                              25.9<L>    BMP (:30)             141     |  103     |  22    		Ca++ --      Ca 7.4<L>             ---------------------------------( 135<H>		Mg 1.80               5.0     |  16<L>   |  2.87<H>			Ph 3.1     BMP ( 08:25)             139     |  101     |  25<H> 		Ca++ --      Ca 7.6<L>             ---------------------------------( 121<H>		Mg --                 4.9     |  14<L>   |  3.12<H>			Ph --        LFTs ( 12:30)      TPro 4.5<L> / Alb 2.8<L> / TBili 2.0<H> / DBili -- / AST -- / ALT 4453<H> / AlkPhos 100  LFTs ( 08:25)      TPro 5.4<L> / Alb 2.9<L> / TBili 1.9<H> / DBili -- / AST >7000<H> / ALT 5534<H> / AlkPhos 112    Coags ( @ 08:25)  aPTT 28.2 / INR 2.57<H> / PT 30.1<H>  Coags ( @ 04:30)  aPTT 28.9 / INR 2.48<H> / PT 29.0<H>      ABG ( @ 12:30)     7.34<L> / 32 / 89 / 17<L> / -7.7<L> / 98.9<H>%     Lactate:     ABG ( @ 08:25)     7.32<L> / 29<L> / 79<L> / 15<L> / -10.1<L> / 97.0%     Lactate:         Urinalysis ( @ 05:30):     Color: Yellow / Appearance: Clear / S.020 / pH: 5.0 / Gluc: 250<!> / Ketones: Trace<!> / Bili: Negative / Urobili: Negative / Protein :100<!> / Nitrites: Negative / Leuk.Est: Trace<!> / RBC: 0-2 / WBC: 6-10<!> / Sq Epi:  / Non Sq Epi: Moderate<!> / Bacteria Many<!>       -> .Blood Blood Culture ( @ 05:30)     NG    NG    No growth to date.    -> Clean Catch Clean Catch (Midstream) Culture ( @ 23:00)     NG    NG    >100,000 CFU/ml Escherichia coli  --------------------------------------------------------------------------------------    IMAGING STUDIES

## 2023-09-14 NOTE — PROGRESS NOTES
Detail Level: Detailed Hospital Medicine Daily Progress Note    Date of Service  8/12/2020    Chief Complaint  88 y.o. male admitted 8/3/2020 with   Chief Complaint   Patient presents with   • GLF     pt had GLF 1 week ago unknown LOC; c/o L sided chest wall pain and L arm and shoulder pain; blood glucose 170 per EMS         Hospital Course  This is a 88-year-old male with past medical history significant for hyperlipidemia, hypertension, dementia  presented to ER on 8/4/2020 after he was noted to  Have left sided shoulder pain and chest wall pain.  It is noted that patient had a ground-level fall 1 week ago, complaint of left-sided chest wall pain, left arm and shoulder pain.  He reports having said a ground-level fall, denied any dizziness.     Upon presentation in ER, he is noted to have macrocytosis with MCV of 106 , mildly elevated troponin at 38.  CT chest abdomen showed left clavicular fracture appears likely subacute.  ER.  Discussed with orthopedic surgery Dr Shielsd, appreciated his input.     CT head showed subtle area of low density in the right parieto-occipital subcortical white matter could represent vasogenic edema, MRI of brain  Showed encephalomalacia. Patient didn't have any neurological deficit. Considering macrocytosis vitamin B12 was obtained noted to be low, will provide vitamin B12 thousand MCG p.o. daily; mildly elevated TSH at 7s , will start synthroid 25 mcg po qd.     He is levated troponin at 38-->28-->22 ; EKG  Didn't show any ST and T wave changes; Echo showed  EF &0% and Moderate AS.  On 8/9/2020, patient noted to be confused agitated with facial droop, CT/CTA chest along with CT perfusion scan--> no acute ischemic CVA noted, reviewed records demonstrate that likely etiology is infectious.  Patient is noted to have elevated WBC at 13, noted to have gurgling sound chest x-ray consistent with pneumonia, patient was started on aspiration precaution, started on Rocephin plus doxycycline and was transferred to  telemetry for close monitoring.  Speech evaluated and that was started as per speech therapy recommendation.  Palliative continue to follow the patient, after next visit discussion with the palliative team with patient's power of , plan is to send patient and his wife to group home on hospice.  Hospice referral has been placed.  At this time his antibiotics were changed to oral antibiotics including Augmentin and doxycycline.    Continue to follow the patient  Patient is noted to be confused, noted to have facial droop, code stroke was called, CT and CTA of the head along with CTA perfusion scan reviewed, neurology consulted, echo reviewed.  --Patient is noted to have elevated WBC of 13, noted to have rales and crackles at the bases, aspiration precaution, lactic acid, blood culture x2 ordered.  Patient was started on  Treatment for aspiration pna with unasyn+Doxy    - speech/PTand OT  Leukocytosis at 13s         Interval Problem Update  Pain-well controlled again today  Dementia-patient is confused, sun downs in the evening but appears to respond to IM geodon.     Psychiatry cannot comment on financial capacity.     Consultants/Specialty  Neurology  Palliative  Psychiatry    Code Status  DNAR/DNI    Disposition  TBD, working on [facility] memory care unit with SNF    Review of Systems  Limited 2/2 patient mentation      Physical Exam  Temp:  [36.2 °C (97.2 °F)-36.7 °C (98 °F)] 36.7 °C (98 °F)  Pulse:  [66-80] 66  Resp:  [14-18] 15  BP: (136-148)/(68-82) 144/68  SpO2:  [95 %-96 %] 96 %    Physical Exam  Vitals signs and nursing note reviewed.   Constitutional:       Appearance: Normal appearance.      Comments: Confused, but interactive   HENT:      Head: Normocephalic and atraumatic.   Eyes:      Pupils: Pupils are equal, round, and reactive to light.   Cardiovascular:      Rate and Rhythm: Normal rate and regular rhythm.      Heart sounds: Normal heart sounds.   Pulmonary:      Effort: Pulmonary effort is  normal.      Comments: Rales heard   Abdominal:      General: Abdomen is flat.      Palpations: Abdomen is soft.      Tenderness: There is no abdominal tenderness.   Musculoskeletal:         General: No swelling.   Skin:     General: Skin is warm.   Neurological:      Mental Status: He is alert.      Comments:      Psychiatric:         Mood and Affect: Mood normal.     No change in physical exam on 8/12/2020    Fluids    Intake/Output Summary (Last 24 hours) at 8/12/2020 1519  Last data filed at 8/11/2020 1855  Gross per 24 hour   Intake 240 ml   Output --   Net 240 ml       Laboratory  Recent Labs     08/10/20  0942 08/11/20  0841   WBC 13.5* 8.8   RBC 4.57* 4.26*   HEMOGLOBIN 16.7 15.7   HEMATOCRIT 49.5 45.0   .3* 105.6*   MCH 36.5* 36.9*   MCHC 33.7 34.9   RDW 52.7* 49.4   PLATELETCT 225 184   MPV 10.8 11.3     Recent Labs     08/10/20  0942 08/11/20  0841 08/12/20  0836   SODIUM 143 140 138   POTASSIUM 3.4* 3.2* 2.7*   CHLORIDE 104 103 103   CO2 25 23 28   GLUCOSE 141* 88 86   BUN 13 14 10   CREATININE 0.63 0.50 0.50   CALCIUM 9.0 8.9 8.5                   Imaging  DX-CHEST-LIMITED (1 VIEW)   Final Result      1.  Bibasilar interstitial infiltrate.      2.  Cardiomegaly.      CT-CTA NECK WITH & W/O-POST PROCESSING   Final Result      1.  Atherosclerotic plaque involving the left carotid bifurcation which results in less than 50% diameter narrowing.      2.  Atherosclerotic plaque involving the right carotid bifurcation which results in 50% diameter narrowing.      3.  Patent vertebral arteries.      CT-CTA HEAD WITH & W/O-POST PROCESS   Final Result      CT angiogram of the [place] within normal limits.      CT-CEREBRAL PERFUSION ANALYSIS   Final Result      1.  Cerebral blood flow less than 30% likely representing completed infarct = 0 mL.      2.  T Max more than 6 seconds likely representing combination of completed infarct and ischemia = 0 mL.      3.  Mismatched volume likely representing  ischemic brain/penumbra = None      4.  Please note that the cerebral perfusion was performed on the limited brain tissue around the basal ganglia region. Infarct/ischemia outside the CT perfusion sections can be missed in this study.      CT-HEAD W/O   Final Result      1.  No evidence of acute intracranial process.      2.  Cerebral atrophy as well as periventricular chronic small vessel ischemic change.      MR-BRAIN-WITH & W/O   Final Result      1.  No acute abnormality.   2.  Moderate cerebral atrophy.   3.  Moderate chronic microvascular ischemic disease.   4.  There is no evidence of vasogenic edema. There is focal encephalomalacia in the right frontal lobe likely secondary to the previous brain insult.      EC-ECHOCARDIOGRAM COMPLETE W/O CONT   Final Result      CT-CHEST (THORAX) WITH   Final Result         1.  Bilateral peripheral reticular pulmonary opacities, appearance suggests component of fibrosis.   2.  Bilateral dependent atelectasis is seen.   3.  Left clavicular fracture, appears likely subacute.   4.  Atherosclerosis and atherosclerotic coronary artery disease      Fleischner Society pulmonary nodule recommendations:         CT-HEAD W/O   Final Result         1.  Subtle area of low-density in the right parieto-occipital subcortical white matter, could represent vasogenic edema. Recommend further characterization with contrast-enhanced CT head or MRI to exclude intracranial mass lesion.   2.  Nonspecific white matter changes, commonly associated with small vessel ischemic disease.  Associated mild cerebral atrophy is noted.   3.  Atherosclerosis.      DX-SHOULDER 2+ LEFT   Final Result         1.  No acute traumatic bony injury.   2.  Degenerative changes of the acromioclavicular and glenohumeral joints.              Assessment/Plan  Intractable pain- (present on admission)  Assessment & Plan  Secondary to gait disorder with recurrent falls resulting in left clavicular fracture.  Orthopedic  Detail Level: Generalized surgery consulted, appreciate their input   -- PT and OT    -- tylenol and toradol  -pain is currently well controlled  -hospice referral has been placed    Clavicular fracture- (present on admission)  Assessment & Plan  Not acute sub-acute clavicular fracture on CT chest    --- discussed with Ortho, appreciate their input    Advance care planning- (present on admission)  Assessment & Plan  -palliative care following  -changed to DNR/DNI  -hospice referral palced    Aspiration pneumonia (HCC)- (present on admission)  Assessment & Plan  Likely secondary to aspiration event, blood cultures x2, pending   --- Was on Unasyn plus doxy, switch to augmentin only, doing well, remains afebrile    Dysphagia- (present on admission)  Assessment & Plan  Patient is noted to have gurgling sound, speech therapy was consulted, diet as per speech therapy recommendation    Hypokalemia- (present on admission)  Assessment & Plan  -low again, unclear etiology  -oral and IV replacement, check daily labs    Dementia with behavioral disturbance (HCC)- (present on admission)  Assessment & Plan  Psych consulted and recs depakote 250 mg tid  along with seroquel  -depakote level therapuetic at 56  -geodon IM and haldol IM PRN  -remains decently controlled  -psych cannot comment on financial capacity, defer to SW/CM    Vasogenic edema (HCC)- (present on admission)  Assessment & Plan  Suspected vasogenic edema in CT,, MRI brain focal encephalomalacia in the right frontal lobe and noted no edema noted   - neuro-check q 4 hs; no focal deficit     Macrocytic anemia- (present on admission)  Assessment & Plan  Likely 2/2 vitamin b12 deficiency    -- will provide  IM vit B12 followed by po     Vitamin B12 deficiency- (present on admission)  Assessment & Plan  Will provide 1000mcg of vitamin b12   IM provided by po    Fall- (present on admission)  Assessment & Plan   Pt and OT, needs arbors with hospice       VTE prophylaxis: lovenox    ROS

## 2024-05-09 NOTE — PROGRESS NOTES
Assumed patient care at 1900. Patient is comfort care. Patient is A&Ox1 and confused. Patient is a two person assist. Patient turned as tolerated. Bed alarm on and in the low and locked position. Two hour rounding in place. Patient denies pain at this time and is resting comfortably in bed. Patient was agreeable to taking his evening medications.     Message left to return call to clinic